# Patient Record
Sex: FEMALE | Race: BLACK OR AFRICAN AMERICAN | Employment: PART TIME | URBAN - METROPOLITAN AREA
[De-identification: names, ages, dates, MRNs, and addresses within clinical notes are randomized per-mention and may not be internally consistent; named-entity substitution may affect disease eponyms.]

---

## 2017-02-07 ENCOUNTER — HOSPITAL ENCOUNTER (OUTPATIENT)
Dept: LAB | Age: 27
Discharge: HOME | End: 2017-02-07

## 2017-02-07 ENCOUNTER — OFFICE VISIT (OUTPATIENT)
Dept: FAMILY MEDICINE CLINIC | Age: 27
End: 2017-02-07

## 2017-02-07 VITALS
HEART RATE: 96 BPM | SYSTOLIC BLOOD PRESSURE: 143 MMHG | BODY MASS INDEX: 53.92 KG/M2 | TEMPERATURE: 98.8 F | HEIGHT: 62 IN | DIASTOLIC BLOOD PRESSURE: 70 MMHG | RESPIRATION RATE: 20 BRPM | OXYGEN SATURATION: 97 % | WEIGHT: 293 LBS

## 2017-02-07 DIAGNOSIS — R68.89 COLD INTOLERANCE: ICD-10-CM

## 2017-02-07 DIAGNOSIS — I10 ESSENTIAL HYPERTENSION: ICD-10-CM

## 2017-02-07 DIAGNOSIS — E66.01 MORBID OBESITY WITH BMI OF 70 AND OVER, ADULT (HCC): Primary | ICD-10-CM

## 2017-02-07 PROCEDURE — 99001 SPECIMEN HANDLING PT-LAB: CPT | Performed by: FAMILY MEDICINE

## 2017-02-07 RX ORDER — NALTREXONE HYDROCHLORIDE 50 MG/1
25 TABLET, FILM COATED ORAL DAILY
Qty: 30 TAB | Refills: 0 | Status: SHIPPED | OUTPATIENT
Start: 2017-02-07 | End: 2017-02-21 | Stop reason: SINTOL

## 2017-02-07 RX ORDER — BUPROPION HYDROCHLORIDE 100 MG/1
100 TABLET ORAL 2 TIMES DAILY
Qty: 60 TAB | Refills: 0 | Status: SHIPPED | OUTPATIENT
Start: 2017-02-07 | End: 2017-02-21 | Stop reason: SINTOL

## 2017-02-07 RX ORDER — VERAPAMIL HYDROCHLORIDE 100 MG/1
100 CAPSULE, EXTENDED RELEASE ORAL
Qty: 30 CAP | Refills: 0 | Status: SHIPPED | OUTPATIENT
Start: 2017-02-07 | End: 2017-04-27 | Stop reason: SDUPTHER

## 2017-02-07 NOTE — PROGRESS NOTES
1. Have you been to the ER, urgent care clinic since your last visit? Hospitalized since your last visit? No    2. Have you seen or consulted any other health care providers outside of the [address] [place] [address] since your last visit? Include any pap smears or colon screening.  No

## 2017-02-07 NOTE — PATIENT INSTRUCTIONS
DASH Diet: Care Instructions  Your Care Instructions  The DASH diet is an eating plan that can help lower your blood pressure. DASH stands for Dietary Approaches to Stop Hypertension. Hypertension is high blood pressure. The DASH diet focuses on eating foods that are high in calcium, potassium, and magnesium. These nutrients can lower blood pressure. The foods that are highest in these nutrients are fruits, vegetables, low-fat dairy products, nuts, seeds, and legumes. But taking calcium, potassium, and magnesium supplements instead of eating foods that are high in those nutrients does not have the same effect. The DASH diet also includes whole grains, fish, and poultry. The DASH diet is one of several lifestyle changes your doctor may recommend to lower your high blood pressure. Your doctor may also want you to decrease the amount of sodium in your diet. Lowering sodium while following the DASH diet can lower blood pressure even further than just the DASH diet alone. Follow-up care is a key part of your treatment and safety. Be sure to make and go to all appointments, and call your doctor if you are having problems. It's also a good idea to know your test results and keep a list of the medicines you take. How can you care for yourself at home? Following the DASH diet  · Eat 4 to 5 servings of fruit each day. A serving is 1 medium-sized piece of fruit, ½ cup chopped or canned fruit, 1/4 cup dried fruit, or 4 ounces (½ cup) of fruit juice. Choose fruit more often than fruit juice. · Eat 4 to 5 servings of vegetables each day. A serving is 1 cup of lettuce or raw leafy vegetables, ½ cup of chopped or cooked vegetables, or 4 ounces (½ cup) of vegetable juice. Choose vegetables more often than vegetable juice. · Get 2 to 3 servings of low-fat and fat-free dairy each day. A serving is 8 ounces of milk, 1 cup of yogurt, or 1 ½ ounces of cheese. · Eat 6 to 8 servings of grains each day.  A serving is 1 slice of bread, 1 ounce of dry cereal, or ½ cup of cooked rice, pasta, or cooked cereal. Try to choose whole-grain products as much as possible. · Limit lean meat, poultry, and fish to 2 servings each day. A serving is 3 ounces, about the size of a deck of cards. · Eat 4 to 5 servings of nuts, seeds, and legumes (cooked dried beans, lentils, and split peas) each week. A serving is 1/3 cup of nuts, 2 tablespoons of seeds, or ½ cup of cooked beans or peas. · Limit fats and oils to 2 to 3 servings each day. A serving is 1 teaspoon of vegetable oil or 2 tablespoons of salad dressing. · Limit sweets and added sugars to 5 servings or less a week. A serving is 1 tablespoon jelly or jam, ½ cup sorbet, or 1 cup of lemonade. · Eat less than 2,300 milligrams (mg) of sodium a day. If you limit your sodium to 1,500 mg a day, you can lower your blood pressure even more. Tips for success  · Start small. Do not try to make dramatic changes to your diet all at once. You might feel that you are missing out on your favorite foods and then be more likely to not follow the plan. Make small changes, and stick with them. Once those changes become habit, add a few more changes. · Try some of the following:  ¨ Make it a goal to eat a fruit or vegetable at every meal and at snacks. This will make it easy to get the recommended amount of fruits and vegetables each day. ¨ Try yogurt topped with fruit and nuts for a snack or healthy dessert. ¨ Add lettuce, tomato, cucumber, and onion to sandwiches. ¨ Combine a ready-made pizza crust with low-fat mozzarella cheese and lots of vegetable toppings. Try using tomatoes, squash, spinach, broccoli, carrots, cauliflower, and onions. ¨ Have a variety of cut-up vegetables with a low-fat dip as an appetizer instead of chips and dip. ¨ Sprinkle sunflower seeds or chopped almonds over salads. Or try adding chopped walnuts or almonds to cooked vegetables. ¨ Try some vegetarian meals using beans and peas. Add garbanzo or kidney beans to salads. Make burritos and tacos with mashed burch beans or black beans. Where can you learn more? Go to http://chaya-eris.info/. Enter I133 in the search box to learn more about \"DASH Diet: Care Instructions. \"  Current as of: March 23, 2016  Content Version: 11.1  © 5015-9240 [employer]. Care instructions adapted under license by [employer] (which disclaims liability or warranty for this information). If you have questions about a medical condition or this instruction, always ask your healthcare professional. [place] [address] any warranty or liability for your use of this information. Starting a Weight Loss Plan: Care Instructions  Your Care Instructions  If you are thinking about losing weight, it can be hard to know where to start. Your doctor can help you set up a weight loss plan that best meets your needs. You may want to take a class on nutrition or exercise, or join a weight loss support group. If you have questions about how to make changes to your eating or exercise habits, ask your doctor about seeing a registered dietitian or an exercise specialist.  It can be a big challenge to lose weight. But you do not have to make huge changes at once. Make small changes, and stick with them. When those changes become habit, add a few more changes. If you do not think you are ready to make changes right now, try to pick a date in the future. Make an appointment to see your doctor to discuss whether the time is right for you to start a plan. Follow-up care is a key part of your treatment and safety. Be sure to make and go to all appointments, and call your doctor if you are having problems. Its also a good idea to know your test results and keep a list of the medicines you take. How can you care for yourself at home? · Set realistic goals. Many people expect to lose much more weight than is likely.  A weight loss of 5% to 10% of your body weight may be enough to improve your health. · Get family and friends involved to provide support. Talk to them about why you are trying to lose weight, and ask them to help. They can help by participating in exercise and having meals with you, even if they may be eating something different. · Find what works best for you. If you do not have time or do not like to cook, a program that offers meal replacement bars or shakes may be better for you. Or if you like to prepare meals, finding a plan that includes daily menus and recipes may be best.  · Ask your doctor about other health professionals who can help you achieve your weight loss goals. ¨ A dietitian can help you make healthy changes in your diet. ¨ An exercise specialist or  can help you develop a safe and effective exercise program.  ¨ A counselor or psychiatrist can help you cope with issues such as depression, anxiety, or family problems that can make it hard to focus on weight loss. · Consider joining a support group for people who are trying to lose weight. Your doctor can suggest groups in your area. Where can you learn more? Go to http://chayahi5eris.info/. Enter A099 in the search box to learn more about \"Starting a Weight Loss Plan: Care Instructions. \"  Current as of: February 16, 2016  Content Version: 11.1  © 4764-0497 [employer]. Care instructions adapted under license by [employer] (which disclaims liability or warranty for this information). If you have questions about a medical condition or this instruction, always ask your healthcare professional. [place] [address] any warranty or liability for your use of this information.

## 2017-02-07 NOTE — PROGRESS NOTES
Zulay Jarquin is a 23 y. o.  female and presents with    Chief Complaint   Patient presents with    New Patient     Establish care           Subjective:  Hypertension  Patient is here for evaluation of hypertension. Age at onset of elevated blood pressure:  17.  She indicates that she is feeling well and denies any symptoms referable to her elevated blood pressure. Specifically denies chest pain, palpitations, dyspnea, orthopnea, PND or peripheral edema. Current medication regimen is as listed   below. Patient has these side effects of medication: headaches. Cardiovascular risk factors: obesity, hypertension Use of agents associated with hypertension: none History of renal disease: negative  History of flank trauma: negative    She is concerned about her weight. She reports that she has used phentermine in the past and had mouth swelling. She is interested in further treatment for weight loss. She had a half a bagel this morning; she usually has a salad at lunch or a can of tuna; dinner is baked food; she is avoiding junk food    Patient Active Problem List   Diagnosis Code    Essential hypertension I10     Patient Active Problem List    Diagnosis Date Noted    Essential hypertension 02/07/2017     Current Outpatient Prescriptions   Medication Sig Dispense Refill    labetalol (NORMODYNE) 100 mg tablet Take 100 mg by mouth two (2) times a day. Allergies   Allergen Reactions    Phentermine Angioedema     Past Medical History   Diagnosis Date    HTN (hypertension)     Morbid obesity with BMI of 70 and over, adult [facility])      History reviewed. No pertinent past surgical history.   Family History   Problem Relation Age of Onset    No Known Problems Mother     No Known Problems Father      Social History   Substance Use Topics    Smoking status: Never Smoker    Smokeless tobacco: Not on file    Alcohol use No       ROS   General ROS: negative for - chills, fever or weight loss  Ophthalmic ROS: positive for - uses glasses  ENT ROS: positive for - headaches  Endocrine ROS: negative for - palpitations or temperature intolerance  Respiratory ROS: no cough, shortness of breath, or wheezing  Gastrointestinal ROS: no abdominal pain, change in bowel habits, or black or bloody stools  Genito-Urinary ROS: no dysuria, trouble voiding, or hematuria  Neurological ROS: negative for - numbness/tingling or weakness  Dermatological ROS: negative for - rash or skin lesion changes    All other systems reviewed and are negative. Objective:  Vitals:    02/07/17 0918   BP: 143/70   Pulse: 96   Resp: 20   Temp: 98.8 °F (37.1 °C)   TempSrc: Oral   SpO2: 97%   Weight: (!) 458 lb 3.2 oz (207.8 kg)   Height: 5' 2\" (1.575 m)   PainSc:   9       alert, well appearing, and in no distress, oriented to person, place, and time and morbidly obese  Mental status - normal mood, behavior, speech, dress, motor activity, and thought processes  Neck - supple, no significant adenopathy, thyroid exam: thyroid is normal in size without nodules or tenderness  Chest - clear to auscultation, no wheezes, rales or rhonchi, symmetric air entry  Heart - normal rate, regular rhythm, normal S1, S2, no murmurs, rubs, clicks or gallops  Neurological - normal gait and station    Assessment/Plan:    1. Morbid obesity with BMI of 70 and over, adult [facility])  Menu plan provided; encourage water exercises; start bupropionand natrexone for treatment  - buPROPion (WELLBUTRIN) 100 mg tablet; Take 1 Tab by mouth two (2) times a day. Dispense: 60 Tab; Refill: 0  - naltrexone (DEPADE) 50 mg tablet; Take 0.5 Tabs by mouth daily. Dispense: 30 Tab; Refill: 0    2. Essential hypertension  Goal < 140/90; start verapamil for control  - verapamil ER (VERELAN PM) 100 mg capsule; Take 1 Cap by mouth nightly. Dispense: 30 Cap;  Refill: 0      Lab review: orders written for new lab studies as appropriate; see orders      I have discussed the diagnosis with the patient and the intended plan as seen in the above orders. The patient has received an after-visit summary and questions were answered concerning future plans. I have discussed medication side effects and warnings with the patient as well. I have reviewed the plan of care with the patient, accepted their input and they are in agreement with the treatment goals. Follow-up Disposition:  Return in about 2 weeks (around 2/21/2017) for medication evaluation.

## 2017-02-07 NOTE — MR AVS SNAPSHOT
Visit Information Date & Time Provider Department Dept. Phone Encounter #  
 2/7/2017  9:00 AM Felicita Breen, [address] [facility] [phone] [id number] Follow-up Instructions Return in about 2 weeks (around 2/21/2017) for medication evaluation. Upcoming Health Maintenance Date Due  
 HPV AGE 9Y-34Y (1 of 3 - Female 3 Dose Series) 6/5/2002 DTaP/Tdap/Td series (1 - Tdap) 6/5/2012 PAP AKA CERVICAL CYTOLOGY 6/5/2012 INFLUENZA AGE 9 TO ADULT 8/1/2016 Allergies as of 2/7/2017  Review Complete On: 2/7/2017 By: Felicita Breen MD  
  
 Severity Noted Reaction Type Reactions Phentermine  02/07/2017    Angioedema Current Immunizations  Never Reviewed No immunizations on file. Not reviewed this visit You Were Diagnosed With   
  
 Codes Comments Morbid obesity with BMI of 70 and over, adult [facility])    -  Primary ICD-10-CM: E66.01, D96.80 ICD-9-CM: 278.01, V85.45 Essential hypertension     ICD-10-CM: I10 
ICD-9-CM: 401.9 Cold intolerance     ICD-10-CM: R68.89 ICD-9-CM: 780.99 Vitals BP Pulse Temp Resp Height(growth percentile) Weight(growth percentile) 143/70 (BP 1 Location: Right arm, BP Patient Position: Sitting) 96 98.8 °F (37.1 °C) (Oral) 20 5' 2\" (1.575 m) (!) 458 lb 3.2 oz (207.8 kg) SpO2 BMI OB Status Smoking Status 97% 83.81 kg/m2 Having regular periods Never Smoker Vitals History BMI and BSA Data Body Mass Index Body Surface Area  
 83.81 kg/m 2 3.01 m 2 Preferred Pharmacy Pharmacy Name Phone [facility] [address], [address] [place], [place] [address]rad[address] [phone] Your Updated Medication List  
  
   
This list is accurate as of: 2/7/17  9:45 AM.  Always use your most recent med list.  
  
  
  
  
 buPROPion 100 mg tablet Commonly known as:  [facility] Take 1 Tab by mouth two (2) times a day. naltrexone 50 mg tablet Commonly known as:  DEPADE  
 Take 0.5 Tabs by mouth daily. verapamil  mg capsule Commonly known as:  VERELAN PM  
Take 1 Cap by mouth nightly. Prescriptions Sent to Pharmacy Refills buPROPion (WELLBUTRIN) 100 mg tablet 0 Sig: Take 1 Tab by mouth two (2) times a day. Class: Normal  
 Pharmacy: [id number], [address] Ph #: [phone] Route: Oral  
 naltrexone (DEPADE) 50 mg tablet 0 Sig: Take 0.5 Tabs by mouth daily. Class: Normal  
 Pharmacy: [id number], [address] Ph #: [phone] Route: Oral  
 verapamil ER (VERELAN PM) 100 mg capsule 0 Sig: Take 1 Cap by mouth nightly. Class: Normal  
 Pharmacy: [id number], [address] Ph #: [phone] Route: Oral  
  
Follow-up Instructions Return in about 2 weeks (around 2/21/2017) for medication evaluation. To-Do List   
 02/07/2017 Lab:  CBC WITH AUTOMATED DIFF   
  
 02/07/2017 Lab:  HEMOGLOBIN A1C WITH EAG   
  
 02/07/2017 Lab:  LIPID PANEL   
  
 02/07/2017 Lab:  TSH 3RD GENERATION Patient Instructions DASH Diet: Care Instructions Your Care Instructions The DASH diet is an eating plan that can help lower your blood pressure. DASH stands for Dietary Approaches to Stop Hypertension. Hypertension is high blood pressure. The DASH diet focuses on eating foods that are high in calcium, potassium, and magnesium. These nutrients can lower blood pressure. The foods that are highest in these nutrients are fruits, vegetables, low-fat dairy products, nuts, seeds, and legumes. But taking calcium, potassium, and magnesium supplements instead of eating foods that are high in those nutrients does not have the same effect. The DASH diet also includes whole grains, fish, and poultry.  
The DASH diet is one of several lifestyle changes your doctor may recommend to lower your high blood pressure. Your doctor may also want you to decrease the amount of sodium in your diet. Lowering sodium while following the DASH diet can lower blood pressure even further than just the DASH diet alone. Follow-up care is a key part of your treatment and safety. Be sure to make and go to all appointments, and call your doctor if you are having problems. It's also a good idea to know your test results and keep a list of the medicines you take. How can you care for yourself at home? Following the DASH diet · Eat 4 to 5 servings of fruit each day. A serving is 1 medium-sized piece of fruit, ½ cup chopped or canned fruit, 1/4 cup dried fruit, or 4 ounces (½ cup) of fruit juice. Choose fruit more often than fruit juice. · Eat 4 to 5 servings of vegetables each day. A serving is 1 cup of lettuce or raw leafy vegetables, ½ cup of chopped or cooked vegetables, or 4 ounces (½ cup) of vegetable juice. Choose vegetables more often than vegetable juice. · Get 2 to 3 servings of low-fat and fat-free dairy each day. A serving is 8 ounces of milk, 1 cup of yogurt, or 1 ½ ounces of cheese. · Eat 6 to 8 servings of grains each day. A serving is 1 slice of bread, 1 ounce of dry cereal, or ½ cup of cooked rice, pasta, or cooked cereal. Try to choose whole-grain products as much as possible. · Limit lean meat, poultry, and fish to 2 servings each day. A serving is 3 ounces, about the size of a deck of cards. · Eat 4 to 5 servings of nuts, seeds, and legumes (cooked dried beans, lentils, and split peas) each week. A serving is 1/3 cup of nuts, 2 tablespoons of seeds, or ½ cup of cooked beans or peas. · Limit fats and oils to 2 to 3 servings each day. A serving is 1 teaspoon of vegetable oil or 2 tablespoons of salad dressing. · Limit sweets and added sugars to 5 servings or less a week. A serving is 1 tablespoon jelly or jam, ½ cup sorbet, or 1 cup of lemonade. · Eat less than 2,300 milligrams (mg) of sodium a day. If you limit your sodium to 1,500 mg a day, you can lower your blood pressure even more. Tips for success · Start small. Do not try to make dramatic changes to your diet all at once. You might feel that you are missing out on your favorite foods and then be more likely to not follow the plan. Make small changes, and stick with them. Once those changes become habit, add a few more changes. · Try some of the following: ¨ Make it a goal to eat a fruit or vegetable at every meal and at snacks. This will make it easy to get the recommended amount of fruits and vegetables each day. ¨ Try yogurt topped with fruit and nuts for a snack or healthy dessert. ¨ Add lettuce, tomato, cucumber, and onion to sandwiches. ¨ Combine a ready-made pizza crust with low-fat mozzarella cheese and lots of vegetable toppings. Try using tomatoes, squash, spinach, broccoli, carrots, cauliflower, and onions. ¨ Have a variety of cut-up vegetables with a low-fat dip as an appetizer instead of chips and dip. ¨ Sprinkle sunflower seeds or chopped almonds over salads. Or try adding chopped walnuts or almonds to cooked vegetables. ¨ Try some vegetarian meals using beans and peas. Add garbanzo or kidney beans to salads. Make burritos and tacos with mashed burch beans or black beans. Where can you learn more? Go to http://chaya-eris.info/. Enter I646 in the search box to learn more about \"DASH Diet: Care Instructions. \" Current as of: March 23, 2016 Content Version: 11.1 © 6541-8281 Affinio. Care instructions adapted under license by ByeCity (which disclaims liability or warranty for this information). If you have questions about a medical condition or this instruction, always ask your healthcare professional. [address] any warranty or liability for your use of this information. Starting a Weight Loss Plan: Care Instructions Your Care Instructions If you are thinking about losing weight, it can be hard to know where to start. Your doctor can help you set up a weight loss plan that best meets your needs. You may want to take a class on nutrition or exercise, or join a weight loss support group. If you have questions about how to make changes to your eating or exercise habits, ask your doctor about seeing a registered dietitian or an exercise specialist. 
It can be a big challenge to lose weight. But you do not have to make huge changes at once. Make small changes, and stick with them. When those changes become habit, add a few more changes. If you do not think you are ready to make changes right now, try to pick a date in the future. Make an appointment to see your doctor to discuss whether the time is right for you to start a plan. Follow-up care is a key part of your treatment and safety. Be sure to make and go to all appointments, and call your doctor if you are having problems. Its also a good idea to know your test results and keep a list of the medicines you take. How can you care for yourself at home? · Set realistic goals. Many people expect to lose much more weight than is likely. A weight loss of 5% to 10% of your body weight may be enough to improve your health. · Get family and friends involved to provide support. Talk to them about why you are trying to lose weight, and ask them to help. They can help by participating in exercise and having meals with you, even if they may be eating something different. · Find what works best for you. If you do not have time or do not like to cook, a program that offers meal replacement bars or shakes may be better for you. Or if you like to prepare meals, finding a plan that includes daily menus and recipes may be best. 
· Ask your doctor about other health professionals who can help you achieve your weight loss goals. ¨ A dietitian can help you make healthy changes in your diet. ¨ An exercise specialist or  can help you develop a safe and effective exercise program. 
¨ A counselor or psychiatrist can help you cope with issues such as depression, anxiety, or family problems that can make it hard to focus on weight loss. · Consider joining a support group for people who are trying to lose weight. Your doctor can suggest groups in your area. Where can you learn more? Go to http://chaya-eris.info/. Enter O494 in the search box to learn more about \"Starting a Weight Loss Plan: Care Instructions. \" Current as of: February 16, 2016 Content Version: 11.1 © 6219-4431 Purveyour. Care instructions adapted under license by AccessPay (which disclaims liability or warranty for this information). If you have questions about a medical condition or this instruction, always ask your healthcare professional. [place] [address] any warranty or liability for your use of this information. Introducing [facility] & HEALTH SERVICES! Spring Evans introduces Biba patient portal. Now you can access parts of your medical record, email your doctor's office, and request medication refills online. 1. In your internet browser, go to [email or web address] 2. Click on the First Time User? Click Here link in the Sign In box. You will see the New Member Sign Up page. 3. Enter your Biba Access Code exactly as it appears below. You will not need to use this code after youve completed the sign-up process. If you do not sign up before the expiration date, you must request a new code. · Biba Access Code: [email or web address] Expires: 5/8/2017  8:38 AM 
 
4. Enter the last four digits of your Social Security Number (xxxx) and Date of Birth (mm/dd/yyyy) as indicated and click Submit. You will be taken to the next sign-up page. 5. Create a BloomReach ID. This will be your BloomReach login ID and cannot be changed, so think of one that is secure and easy to remember. 6. Create a BloomReach password. You can change your password at any time. 7. Enter your Password Reset Question and Answer. This can be used at a later time if you forget your password. 8. Enter your e-mail address. You will receive e-mail notification when new information is available in [address] 9. Click Sign Up. You can now view and download portions of your medical record. 10. Click the Download Summary menu link to download a portable copy of your medical information. If you have questions, please visit the Frequently Asked Questions section of the BloomReach website. Remember, BloomReach is NOT to be used for urgent needs. For medical emergencies, dial 911. Now available from your iPhone and Android! Please provide this summary of care documentation to your next provider. Your primary care clinician is listed as NONE. If you have any questions after today's visit, please call [phone].

## 2017-02-08 LAB
BASOPHILS # BLD AUTO: 0 X10E3/UL (ref 0–0.2)
BASOPHILS NFR BLD AUTO: 0 %
CHOLEST SERPL-MCNC: 176 MG/DL (ref 100–199)
EOSINOPHIL # BLD AUTO: 0.1 X10E3/UL (ref 0–0.4)
EOSINOPHIL NFR BLD AUTO: 3 %
ERYTHROCYTE [DISTWIDTH] IN BLOOD BY AUTOMATED COUNT: 13.8 % (ref 12.3–15.4)
EST. AVERAGE GLUCOSE BLD GHB EST-MCNC: 128 MG/DL
HBA1C MFR BLD: 6.1 % (ref 4.8–5.6)
HCT VFR BLD AUTO: 38.1 % (ref 34–46.6)
HDLC SERPL-MCNC: 58 MG/DL
HGB BLD-MCNC: 12.1 G/DL (ref 11.1–15.9)
IMM GRANULOCYTES # BLD: 0 X10E3/UL (ref 0–0.1)
IMM GRANULOCYTES NFR BLD: 0 %
INTERPRETATION, 910389: NORMAL
LDLC SERPL CALC-MCNC: 109 MG/DL (ref 0–99)
LYMPHOCYTES # BLD AUTO: 1.8 X10E3/UL (ref 0.7–3.1)
LYMPHOCYTES NFR BLD AUTO: 38 %
MCH RBC QN AUTO: 27.2 PG (ref 26.6–33)
MCHC RBC AUTO-ENTMCNC: 31.8 G/DL (ref 31.5–35.7)
MCV RBC AUTO: 86 FL (ref 79–97)
MONOCYTES # BLD AUTO: 0.4 X10E3/UL (ref 0.1–0.9)
MONOCYTES NFR BLD AUTO: 8 %
NEUTROPHILS # BLD AUTO: 2.5 X10E3/UL (ref 1.4–7)
NEUTROPHILS NFR BLD AUTO: 51 %
PLATELET # BLD AUTO: 394 X10E3/UL (ref 150–379)
RBC # BLD AUTO: 4.45 X10E6/UL (ref 3.77–5.28)
TRIGL SERPL-MCNC: 47 MG/DL (ref 0–149)
TSH SERPL DL<=0.005 MIU/L-ACNC: 1.72 UIU/ML (ref 0.45–4.5)
VLDLC SERPL CALC-MCNC: 9 MG/DL (ref 5–40)
WBC # BLD AUTO: 4.8 X10E3/UL (ref 3.4–10.8)

## 2017-02-21 ENCOUNTER — OFFICE VISIT (OUTPATIENT)
Dept: FAMILY MEDICINE CLINIC | Age: 27
End: 2017-02-21

## 2017-02-21 VITALS
DIASTOLIC BLOOD PRESSURE: 88 MMHG | SYSTOLIC BLOOD PRESSURE: 151 MMHG | BODY MASS INDEX: 53.92 KG/M2 | WEIGHT: 293 LBS | OXYGEN SATURATION: 97 % | RESPIRATION RATE: 20 BRPM | TEMPERATURE: 98.6 F | HEART RATE: 90 BPM | HEIGHT: 62 IN

## 2017-02-21 DIAGNOSIS — E66.01 MORBID OBESITY WITH BMI OF 70 AND OVER, ADULT (HCC): ICD-10-CM

## 2017-02-21 DIAGNOSIS — Z00.00 MEDICARE ANNUAL WELLNESS VISIT, INITIAL: ICD-10-CM

## 2017-02-21 DIAGNOSIS — I10 ESSENTIAL HYPERTENSION: Primary | ICD-10-CM

## 2017-02-21 DIAGNOSIS — R73.03 PREDIABETES: ICD-10-CM

## 2017-02-21 DIAGNOSIS — Z71.89 ADVANCE CARE PLANNING: ICD-10-CM

## 2017-02-21 RX ORDER — HYDROCHLOROTHIAZIDE 25 MG/1
25 TABLET ORAL DAILY
Qty: 30 TAB | Refills: 1 | Status: SHIPPED | OUTPATIENT
Start: 2017-02-21 | End: 2017-04-27 | Stop reason: SDUPTHER

## 2017-02-21 RX ORDER — METFORMIN HYDROCHLORIDE 500 MG/1
500 TABLET, EXTENDED RELEASE ORAL
Qty: 30 TAB | Refills: 1 | Status: SHIPPED | OUTPATIENT
Start: 2017-02-21 | End: 2017-09-22 | Stop reason: SINTOL

## 2017-02-21 NOTE — MR AVS SNAPSHOT
Visit Information Date & Time Provider Department Dept. Phone Encounter #  
 2/21/2017  9:00 AM Lexy Simmons, [address] [facility] [phone] Follow-up Instructions Return in about 1 month (around 3/21/2017) for weight management and blood pressure. Upcoming Health Maintenance Date Due  
 HPV AGE 9Y-34Y (1 of 3 - Female 3 Dose Series) 6/5/2002 DTaP/Tdap/Td series (1 - Tdap) 6/5/2012 PAP AKA CERVICAL CYTOLOGY 6/5/2012 Allergies as of 2/21/2017  Review Complete On: 2/21/2017 By: Lexy Simmons MD  
  
 Severity Noted Reaction Type Reactions Phentermine  02/07/2017    Angioedema Current Immunizations  Never Reviewed No immunizations on file. Not reviewed this visit You Were Diagnosed With   
  
 Codes Comments Essential hypertension    -  Primary ICD-10-CM: I10 
ICD-9-CM: 401.9 Morbid obesity with BMI of 70 and over, adult [facility])     ICD-10-CM: E66.01, Z68.45 ICD-9-CM: 278.01, V85.45 Prediabetes     ICD-10-CM: R73.03 
ICD-9-CM: 790.29 Medicare annual wellness visit, initial     ICD-10-CM: Z00.00 ICD-9-CM: V70.0 Advance care planning     ICD-10-CM: Z71.89 ICD-9-CM: V65.49 Vitals BP Pulse Temp Resp Height(growth percentile) Weight(growth percentile) 151/88 90 98.6 °F (37 °C) (Oral) 20 5' 2\" (1.575 m) (!) 454 lb 12.8 oz (206.3 kg) SpO2 BMI OB Status Smoking Status 97% 83.18 kg/m2 Unknown Never Smoker Vitals History BMI and BSA Data Body Mass Index Body Surface Area  
 83.18 kg/m 2 3 m 2 Preferred Pharmacy Pharmacy Name Phone [facility] [address], [address] [place], [place] [address] [phone] Your Updated Medication List  
  
   
This list is accurate as of: 2/21/17  9:29 AM.  Always use your most recent med list.  
  
  
  
  
 hydroCHLOROthiazide 25 mg tablet Commonly known as:  HYDRODIURIL Take 1 Tab by mouth daily. metFORMIN  mg tablet Commonly known as:  GLUCOPHAGE XR Take 1 Tab by mouth daily (with dinner). verapamil  mg capsule Commonly known as:  VERELAN PM  
Take 1 Cap by mouth nightly. Prescriptions Sent to Pharmacy Refills  
 metFORMIN ER (GLUCOPHAGE XR) 500 mg tablet 1 Sig: Take 1 Tab by mouth daily (with dinner). Class: Normal  
 Pharmacy: [id number], [address] Ph #: [phone] Route: Oral  
 hydroCHLOROthiazide (HYDRODIURIL) 25 mg tablet 1 Sig: Take 1 Tab by mouth daily. Class: Normal  
 Pharmacy: [id number], [address] Ph #: [phone] Route: Oral  
  
Follow-up Instructions Return in about 1 month (around 3/21/2017) for weight management and blood pressure. Patient Instructions Well Visit, Ages 25 to 48: Care Instructions Your Care Instructions Physical exams can help you stay healthy. Your doctor has checked your overall health and may have suggested ways to take good care of yourself. He or she also may have recommended tests. At home, you can help prevent illness with healthy eating, regular exercise, and other steps. Follow-up care is a key part of your treatment and safety. Be sure to make and go to all appointments, and call your doctor if you are having problems. It's also a good idea to know your test results and keep a list of the medicines you take. How can you care for yourself at home? · Reach and stay at a healthy weight. This will lower your risk for many problems, such as obesity, diabetes, heart disease, and high blood pressure. · Get at least 30 minutes of physical activity on most days of the week. Walking is a good choice. You also may want to do other activities, such as running, swimming, cycling, or playing tennis or team sports. Discuss any changes in your exercise program with your doctor. · Do not smoke or allow others to smoke around you. If you need help quitting, talk to your doctor about stop-smoking programs and medicines. These can increase your chances of quitting for good. · Talk to your doctor about whether you have any risk factors for sexually transmitted infections (STIs). Having one sex partner (who does not have STIs and does not have sex with anyone else) is a good way to avoid these infections. · Use birth control if you do not want to have children at this time. Talk with your doctor about the choices available and what might be best for you. · Protect your skin from too much sun. When you're outdoors from 10 a.m. to 4 p.m., stay in the shade or cover up with clothing and a hat with a wide brim. Wear sunglasses that block UV rays. Even when it's cloudy, put broad-spectrum sunscreen (SPF 30 or higher) on any exposed skin. · See a dentist one or two times a year for checkups and to have your teeth cleaned. · Wear a seat belt in the car. · Drink alcohol in moderation, if at all. That means no more than 2 drinks a day for men and 1 drink a day for women. Follow your doctor's advice about when to have certain tests. These tests can spot problems early. For everyone · Cholesterol. Have the fat (cholesterol) in your blood tested after age 21. Your doctor will tell you how often to have this done based on your age, family history, or other things that can increase your risk for heart disease. · Blood pressure. Have your blood pressure checked during a routine doctor visit. Your doctor will tell you how often to check your blood pressure based on your age, your blood pressure results, and other factors. · Vision. Talk with your doctor about how often to have a glaucoma test. 
· Diabetes. Ask your doctor whether you should have tests for diabetes. · Colon cancer. Have a test for colon cancer at age 48.  You may have one of several tests. If you are younger than 48, you may need a test earlier if you have any risk factors. Risk factors include whether you already had a precancerous polyp removed from your colon or whether your parent, brother, sister, or child has had colon cancer. For women · Breast exam and mammogram. Talk to your doctor about when you should have a clinical breast exam and a mammogram. Medical experts differ on whether and how often women under 50 should have these tests. Your doctor can help you decide what is right for you. · Pap test and pelvic exam. Begin Pap tests at age 24. A Pap test is the best way to find cervical cancer. The test often is part of a pelvic exam. Ask how often to have this test. 
· Tests for sexually transmitted infections (STIs). Ask whether you should have tests for STIs. You may be at risk if you have sex with more than one person, especially if your partners do not wear condoms. For men · Tests for sexually transmitted infections (STIs). Ask whether you should have tests for STIs. You may be at risk if you have sex with more than one person, especially if you do not wear a condom. · Testicular cancer exam. Ask your doctor whether you should check your testicles regularly. · Prostate exam. Talk to your doctor about whether you should have a blood test (called a PSA test) for prostate cancer. Experts differ on whether and when men should have this test. Some experts suggest it if you are older than 39 and are -American or have a father or brother who got prostate cancer when he was younger than 72. When should you call for help? Watch closely for changes in your health, and be sure to contact your doctor if you have any problems or symptoms that concern you. Where can you learn more? Go to http://chaya-eris.info/. Enter P072 in the search box to learn more about \"Well Visit, Ages 25 to 48: Care Instructions. \" Current as of: July 19, 2016 Content Version: 11.1 © 7482-6644 [employer]elon Pharmaceuticals, Wish Days. Care instructions adapted under license by Iwebalize (which disclaims liability or warranty for this information). If you have questions about a medical condition or this instruction, always ask your healthcare professional. [address] any warranty or liability for your use of this information. Introducing [facility] & HEALTH SERVICES! Byron Lindquist introduces Simbiosis patient portal. Now you can access parts of your medical record, email your doctor's office, and request medication refills online. 1. In your internet browser, go to [email or web address] 2. Click on the First Time User? Click Here link in the Sign In box. You will see the New Member Sign Up page. 3. Enter your Simbiosis Access Code exactly as it appears below. You will not need to use this code after youve completed the sign-up process. If you do not sign up before the expiration date, you must request a new code. · Simbiosis Access Code: [email or web address] Expires: 5/8/2017  8:38 AM 
 
4. Enter the last four digits of your Social Security Number (xxxx) and Date of Birth (mm/dd/yyyy) as indicated and click Submit. You will be taken to the next sign-up page. 5. Create a Simbiosis ID. This will be your Simbiosis login ID and cannot be changed, so think of one that is secure and easy to remember. 6. Create a Simbiosis password. You can change your password at any time. 7. Enter your Password Reset Question and Answer. This can be used at a later time if you forget your password. 8. Enter your e-mail address. You will receive e-mail notification when new information is available in [address] 9. Click Sign Up. You can now view and download portions of your medical record. 10. Click the Download Summary menu link to download a portable copy of your medical information.  
 
If you have questions, please visit the Frequently Asked Questions section of the [employer]. Remember, Technitrolhart is NOT to be used for urgent needs. For medical emergencies, dial 911. Now available from your iPhone and Android! Please provide this summary of care documentation to your next provider. Your primary care clinician is listed as Jacob Valdivia. If you have any questions after today's visit, please call [phone].

## 2017-02-21 NOTE — PROGRESS NOTES
1. Have you been to the ER, urgent care clinic since your last visit? Hospitalized since your last visit? No    2. Have you seen or consulted any other health care providers outside of the [address] [place] [address] since your last visit? Include any pap smears or colon screening.  No

## 2017-02-21 NOTE — PATIENT INSTRUCTIONS

## 2017-02-21 NOTE — PROGRESS NOTES
(AWV) The Initial Medicare Annual Wellness Exam PROGRESS NOTE    This is an Initial Medicare Annual Wellness Exam (AWV) I have reviewed the patient's medical history in detail and updated the computerized patient record. Ailin Ruff is a 23 y. o.  female and presents for an annual wellness exam       Patient Active Problem List   Diagnosis Code    Essential hypertension I10     Patient Active Problem List    Diagnosis Date Noted    Essential hypertension 02/07/2017     Current Outpatient Prescriptions   Medication Sig Dispense Refill    buPROPion (WELLBUTRIN) 100 mg tablet Take 1 Tab by mouth two (2) times a day. 60 Tab 0    naltrexone (DEPADE) 50 mg tablet Take 0.5 Tabs by mouth daily. 30 Tab 0    verapamil ER (VERELAN PM) 100 mg capsule Take 1 Cap by mouth nightly. 30 Cap 0     Allergies   Allergen Reactions    Phentermine Angioedema     Past Medical History   Diagnosis Date    HTN (hypertension)     Morbid obesity with BMI of 70 and over, adult [facility])      History reviewed. No pertinent past surgical history.   Family History   Problem Relation Age of Onset    No Known Problems Mother     No Known Problems Father      Social History   Substance Use Topics    Smoking status: Never Smoker    Smokeless tobacco: Not on file    Alcohol use No       ROS   General ROS: negative for - chills or fever  Psychological ROS: positive for - depression due to treatment fro those around her regarding her weight  Ophthalmic ROS: positive for - uses glasses  ENT ROS: negative for - headaches  Endocrine ROS: negative for - polydipsia/polyuria or temperature intolerance  Respiratory ROS: no cough, shortness of breath, or wheezing  Cardiovascular ROS: no chest pain or dyspnea on exertion  Gastrointestinal ROS: no abdominal pain, change in bowel habits, or black or bloody stools  Genito-Urinary ROS: no dysuria, trouble voiding, or hematuria  Neurological ROS: positive for - dizziness  Dermatological ROS: negative for - rash or skin lesion changes    All other systems reviewed and are negative. History     Past Medical History   Diagnosis Date    HTN (hypertension)     Morbid obesity with BMI of 70 and over, adult [facility])       History reviewed. No pertinent past surgical history. Current Outpatient Prescriptions   Medication Sig Dispense Refill    buPROPion (WELLBUTRIN) 100 mg tablet Take 1 Tab by mouth two (2) times a day. 60 Tab 0    naltrexone (DEPADE) 50 mg tablet Take 0.5 Tabs by mouth daily. 30 Tab 0    verapamil ER (VERELAN PM) 100 mg capsule Take 1 Cap by mouth nightly. 30 Cap 0     Allergies   Allergen Reactions    Phentermine Angioedema     Family History   Problem Relation Age of Onset    No Known Problems Mother     No Known Problems Father      Social History   Substance Use Topics    Smoking status: Never Smoker    Smokeless tobacco: Not on file    Alcohol use No     Patient Active Problem List   Diagnosis Code    Essential hypertension I10       Health Maintenance History  Immunizations reviewed, dtap up to date , pneumovax N/A, flu up to date, zoster N/A    Depression Risk Factor Screening:      Patient Health Questionnaire (PHQ-2)   Over the last 2 weeks, how often have you been bothered by any of the following problems? · Little interest or pleasure in doing things? · More than half the days. [2]  · Feeling down, depressed, or hopeless? · Not at all. [0]    Total Score: 2/6  PHQ-2 Assessment Scoring:   A score of 2 or more requires further screening with the PHQ-9    Alcohol Risk Factor Screening:     Women: On any occasion during the past 3 months, have you had more than 3 drinks containing alcohol?  no   Do you average more than 7 drinks per week? no    Functional Ability and Level of Safety:     Hearing Loss    none    Activities of Daily Living   Self-care.    Requires assistance with: no ADLs    Fall Risk   History of fall(s) in the past 3 months (25 pts)  Score: 25    Abuse Screen   None    Examination   Physical Examination  Vitals:    02/21/17 0859 02/21/17 0904   BP: (!) 148/91 151/88   Pulse: 90    Resp: 20    Temp: 98.6 °F (37 °C)    TempSrc: Oral    SpO2: 97%    Weight: (!) 454 lb 12.8 oz (206.3 kg)    Height: 5' 2\" (1.575 m)    PainSc:   0 - No pain       Body mass index is 83.18 kg/(m^2). Evaluation of Cognitive Function:  Mood/affect:normal mood with appropriate affect; tearful when speaking about her weight and her treatment  Appearance:well kempt  Family member/caregiver input:N/A    alert, well appearing, and in no distress, oriented to person, place, and time and morbidly obese    Patient Care Team:  Osmin Roberts MD as PCP - General (Family Practice)    End-of-life planning  Advanced Directive in the case than an injury or illness causes the patient to be unable to make health care decisions    Health Care Directive or Living Will: no    Advice/Referrals/Counselling/Plan:   Education and counseling provided:  End-of-Life planning (with patient's consent)  Diabetes screening test  weight loss, physical activity, fall prevention, and nutrition    ICD-10-CM ICD-9-CM    1. Essential hypertension I10 401.9 hydroCHLOROthiazide (HYDRODIURIL) 25 mg tablet   2. Morbid obesity with BMI of 70 and over, [facility] E66.01 278.01     Z68.45 V85.45    3. Prediabetes R73.03 790.29 metFORMIN ER (GLUCOPHAGE XR) 500 mg tablet   4. Medicare annual wellness visit, initial Z00.00 V70.0    5. Advance care planning Z71.89 V65.49    . Brief written plan, checklist    I have discussed the diagnosis with the patient and the intended plan as seen in the above orders. The patient has received an after-visit summary and questions were answered concerning future plans. I have discussed medication side effects and warnings with the patient as well. I have reviewed the plan of care with the patient, accepted their input and they are in agreement with the treatment goals.          Follow-up Disposition:  Return in about 1 month (around 3/21/2017) for weight management and blood pressure.      ____________________________________________________________    Problem Assessment    for treatment of   Chief Complaint   Patient presents with    Medication Evaluation         SUBJECTIVE  Hypertension  Patient is here for follow-up of hypertension. She started verapamil and had episode last Sunday when she is in a rest room and felt lightheaded and dizzy and fell. She hit her head on the stall. She denies losing consciousness. She stopped taking medications. Yesterday she felt dizzy and nausea. She did not vomit. She has had sick contacts in her home. She is exercising and is adherent to low salt diet. Blood pressure is not measured at home. Use of agents associated with hypertension: none. She started naltrexone for weight loss but did not have the accompanying medication bupropion due to cost.        Visit Vitals    /88    Pulse 90    Temp 98.6 °F (37 °C) (Oral)    Resp 20    Ht 5' 2\" (1.575 m)    Wt (!) 454 lb 12.8 oz (206.3 kg)    SpO2 97%    BMI 83.18 kg/m2     General:  Alert, cooperative, no distress, appears stated age. Morbidly obese   Head:  Normocephalic, without obvious abnormality, atraumatic. Eyes:  Conjunctivae/corneas clear. PERRL, EOMs intact. Ears:  Normal TMs and external ear canals both ears. Nose: Nares normal. Septum midline. Mucosa normal. No drainage or sinus tenderness. Throat: Lips, mucosa, and tongue normal. Teeth and gums normal.   Neck: Supple, symmetrical, trachea midline, no adenopathy, thyroid: no enlargement/tenderness/nodules   Back:   Symmetric, no curvature. ROM normal. No CVA tenderness. Lungs:   Clear to auscultation bilaterally. Chest wall:  No tenderness or deformity. Heart:  Regular rate and rhythm, S1, S2 normal, no murmur, click, rub or gallop. Breast Exam:  deferred   Abdomen:   Soft, non-tender.  Bowel sounds normal. No masses,  No organomegaly. Genitalia:  deferred   Rectal:  deferred   Extremities: Extremities normal, atraumatic, no cyanosis or edema. Pulses: 2+ and symmetric all extremities. Skin: Skin color, texture, turgor normal. No rashes or lesions. Lymph nodes: Cervical, supraclavicular, and axillary nodes normal.   Neurologic: CNII-XII intact. Normal strength, sensation and reflexes throughout. LABS   Hgb a1c 6.1  TSH 1.720      Assessment/Plan:    1. Essential hypertension  Pt had episode of dizziness unsure if it was medications causing symptom; goal <140/90; continue verapamil and start HCTZ  - hydroCHLOROthiazide (HYDRODIURIL) 25 mg tablet; Take 1 Tab by mouth daily. Dispense: 30 Tab; Refill: 1    2. Morbid obesity with BMI of 70 and over, adult ([facility]  Pt unable to obtain bupropion due to cost. Naltrexone not tolerated; stop naltrexone an encourage healthy diet and water exercise for low impact    3. Prediabetes  Discussed carbohydrate intake with patient. Start metformin for weight management  - metFORMIN ER (GLUCOPHAGE XR) 500 mg tablet; Take 1 Tab by mouth daily (with dinner). Dispense: 30 Tab; Refill: 1    4. Medicare annual wellness visit, initial  Reviewed preventive recommendations    5. Advance care planning  See ACP    I have reviewed/discussed the above normal BMI with the patient. I have recommended the following interventions: dietary management education, guidance, and counseling, dietary needs education and encourage exercise . The plan is as follows: I have counseled this patient on diet and exercise regimens.  .        Lab review: labs reviewed, I note that glycosylated hemoglobin mildly abnormal but acceptable, TSH normal

## 2017-02-23 NOTE — ACP (ADVANCE CARE PLANNING)
Advance Care Planning (ACP) Provider Note - Comprehensive     Date of ACP Conversation: 02/21/17  Persons included in Conversation:  patient  Length of ACP Conversation in minutes:  <16 minutes (Non-Billable)    Authorized Decision Maker (if patient is incapable of making informed decisions): This person is:  her mother          General ACP for ALL Patients with Decision Making Capacity:   Importance of advance care planning, including choosing a healthcare agent to communicate patient's healthcare decisions if patient lost the ability to make decisions, such as after a sudden illness or accident  Understanding of the healthcare agent role was assessed and information provided  Exploration of values, goals, and preferences if recovery is not expected, even with continued medical treatment in the event of: Imminent death  Severe, permanent brain injury  \"In these circumstances, what matters most to you? \"  Care focused more on comfort or quality of life. \"What, if any, treatments would you want to avoid? \" none    Review of Existing Advance Directive:  What information were you given about medical decisions to consider before completing your advance directive? none    For Serious or Chronic Illness:  No known illness    Interventions Provided:  Recommended completion of Advance Directive form after review of ACP materials and conversation with prospective healthcare agent

## 2017-03-16 ENCOUNTER — TELEPHONE (OUTPATIENT)
Dept: FAMILY MEDICINE CLINIC | Age: 27
End: 2017-03-16

## 2017-03-29 ENCOUNTER — OFFICE VISIT (OUTPATIENT)
Dept: FAMILY MEDICINE CLINIC | Age: 27
End: 2017-03-29

## 2017-03-29 VITALS
DIASTOLIC BLOOD PRESSURE: 92 MMHG | BODY MASS INDEX: 53.92 KG/M2 | HEART RATE: 104 BPM | HEIGHT: 62 IN | WEIGHT: 293 LBS | RESPIRATION RATE: 18 BRPM | OXYGEN SATURATION: 95 % | TEMPERATURE: 97.2 F | SYSTOLIC BLOOD PRESSURE: 128 MMHG

## 2017-03-29 DIAGNOSIS — I10 ESSENTIAL HYPERTENSION: ICD-10-CM

## 2017-03-29 DIAGNOSIS — J06.9 VIRAL UPPER RESPIRATORY INFECTION: Primary | ICD-10-CM

## 2017-03-29 DIAGNOSIS — E66.01 MORBID OBESITY WITH BMI OF 70 AND OVER, ADULT (HCC): ICD-10-CM

## 2017-03-29 DIAGNOSIS — H52.13 MYOPIA, BILATERAL: ICD-10-CM

## 2017-03-29 NOTE — PROGRESS NOTES
1. Have you been to the ER, urgent care clinic since your last visit? Hospitalized since your last visit? No    2. Have you seen or consulted any other health care providers outside of the [address] [place] [address] since your last visit? Include any pap smears or colon screening.  No

## 2017-03-29 NOTE — PROGRESS NOTES
Chris Londono is a 23 y. o.  female and presents with    Chief Complaint   Patient presents with    Weight Management    Cough       Subjective:  Upper Respiratory Infection  Patient complains of symptoms of a URI. Symptoms include congestion, sore throat, fever and cough. Onset of symptoms was 1 week ago, gradually improving since that time. She also c/o sneezing for the past 4 days . She is drinking plenty of fluids. . Evaluation to date: none. Treatment to date: otc cough syrup. She is also here for weight management. She has been taking metformin. She has been exercising intermittently. She has changed her diet and is drinking water. She is requesting referral to eye doctor. Patient Active Problem List   Diagnosis Code    Essential hypertension I10    Prediabetes R73.03    Morbid obesity with BMI of 70 and over, adult [facility] E66.01, Z68.45    Advance care planning Z71.89     Patient Active Problem List    Diagnosis Date Noted    Prediabetes 02/21/2017    Morbid obesity with BMI of 70 and over, adult ([facility] 02/21/2017    Advance care planning 02/21/2017    Essential hypertension 02/07/2017     Current Outpatient Prescriptions   Medication Sig Dispense Refill    metFORMIN ER (GLUCOPHAGE XR) 500 mg tablet Take 1 Tab by mouth daily (with dinner). 30 Tab 1    hydroCHLOROthiazide (HYDRODIURIL) 25 mg tablet Take 1 Tab by mouth daily. 30 Tab 1    verapamil ER (VERELAN PM) 100 mg capsule Take 1 Cap by mouth nightly. 30 Cap 0     Allergies   Allergen Reactions    Phentermine Angioedema     Past Medical History:   Diagnosis Date    HTN (hypertension)     Morbid obesity with BMI of 70 and over, adult [facility])      History reviewed. No pertinent surgical history.   Family History   Problem Relation Age of Onset    No Known Problems Mother     No Known Problems Father      Social History   Substance Use Topics    Smoking status: Never Smoker    Smokeless tobacco: Not on file    Alcohol use No       ROS   General ROS: negative for - night sweats  Psychological ROS: negative for - anxiety or depression  Ophthalmic ROS: positive for - uses glasses  ENT ROS: negative for - headaches  Endocrine ROS: negative for - polydipsia/polyuria or temperature intolerance  Breast ROS: negative for breast lumps  Respiratory ROS: no shortness of breath, or wheezing  Cardiovascular ROS: no chest pain or dyspnea on exertion  Gastrointestinal ROS: no abdominal pain, change in bowel habits, or black or bloody stools  Genito-Urinary ROS: no dysuria, trouble voiding, or hematuria  Dermatological ROS: negative for - rash or skin lesion changes    All other systems reviewed and are negative. Objective:  Vitals:    03/29/17 0937 03/29/17 0943   BP: (!) 163/94 (!) 128/92   Pulse: (!) 104    Resp: 18    Temp: 97.2 °F (36.2 °C)    TempSrc: Oral    SpO2: 95%    Weight: (!) 449 lb 6.4 oz (203.8 kg)    Height: 5' 2\" (1.575 m)    PainSc:   0 - No pain        alert, well appearing, and in no distress, oriented to person, place, and time and morbidly obese  Mental status - normal mood, behavior, speech, dress, motor activity, and thought processes  Mouth - mucous membranes moist, pharynx normal without lesions  Chest - clear to auscultation, no wheezes, rales or rhonchi, symmetric air entry  Heart - normal rate, regular rhythm, normal S1, S2, no murmurs, rubs, clicks or gallops  Neurological - normal gait and station    Assessment/Plan:    1. Viral upper respiratory infection  Increase fluid intake and rest  2. Myopia, bilateral    - REFERRAL TO OPHTHALMOLOGY    3. Essential hypertension  Goal <140/90; encourage healthy diet and exercise    4.  Morbid obesity with BMI of 70 and over, adult ([facility]  Weight loss with metformin and lifestyle change; encourage regular exercise at least 3 times a week for 30 minutes      Lab review: no lab studies available for review at time of visit      I have discussed the diagnosis with the patient and the intended plan as seen in the above orders. The patient has received an after-visit summary and questions were answered concerning future plans. I have discussed medication side effects and warnings with the patient as well. I have reviewed the plan of care with the patient, accepted their input and they are in agreement with the treatment goals. Follow-up Disposition:  Return in about 1 month (around 4/29/2017), or if symptoms worsen or fail to improve, for weight management.

## 2017-03-29 NOTE — MR AVS SNAPSHOT
Visit Information Date & Time Provider Department Dept. Phone Encounter #  
 3/29/2017  9:15 AM Sera DanNick [phone] [id number] Follow-up Instructions Return in about 1 month (around 4/29/2017), or if symptoms worsen or fail to improve, for weight management. Upcoming Health Maintenance Date Due  
 HPV AGE 9Y-34Y (1 of 3 - Female 3 Dose Series) 6/5/2002 DTaP/Tdap/Td series (1 - Tdap) 6/5/2012 PAP AKA CERVICAL CYTOLOGY 6/5/2012 Allergies as of 3/29/2017  Review Complete On: 3/29/2017 By: Sera Dan MD  
  
 Severity Noted Reaction Type Reactions Phentermine  02/07/2017    Angioedema Current Immunizations  Never Reviewed No immunizations on file. Not reviewed this visit You Were Diagnosed With   
  
 Codes Comments Viral upper respiratory infection    -  Primary ICD-10-CM: J06.9, B97.89 ICD-9-CM: 465.9 Myopia, bilateral     ICD-10-CM: H52.13 ICD-9-CM: 367.1 Essential hypertension     ICD-10-CM: I10 
ICD-9-CM: 401.9 Morbid obesity with BMI of 70 and over, adult [facility])     ICD-10-CM: E66.01, Z68.45 ICD-9-CM: 278.01, V85.45 Vitals BP Pulse Temp Resp Height(growth percentile) Weight(growth percentile) (!) 128/92 (BP 1 Location: Right arm, BP Patient Position: Sitting) (!) 104 97.2 °F (36.2 °C) (Oral) 18 5' 2\" (1.575 m) (!) 449 lb 6.4 oz (203.8 kg) SpO2 BMI OB Status Smoking Status 95% 82.2 kg/m2 Unknown Never Smoker Vitals History BMI and BSA Data Body Mass Index Body Surface Area  
 82.2 kg/m 2 2.99 m 2 Preferred Pharmacy Pharmacy Name Phone [facility] [address], [place] [phone] Your Updated Medication List  
  
   
This list is accurate as of: 3/29/17  9:59 AM.  Always use your most recent med list.  
  
  
  
  
 hydroCHLOROthiazide 25 mg tablet Commonly known as:  HYDRODIURIL  
 Take 1 Tab by mouth daily. metFORMIN  mg tablet Commonly known as:  GLUCOPHAGE XR Take 1 Tab by mouth daily (with dinner). verapamil  mg capsule Commonly known as:  VERELAN PM  
Take 1 Cap by mouth nightly. We Performed the Following REFERRAL TO OPHTHALMOLOGY [REF57 Custom] Comments:  
 Please evaluate 22 y/o female patient for annual eye exam.  
  
Follow-up Instructions Return in about 1 month (around 4/29/2017), or if symptoms worsen or fail to improve, for weight management. Referral Information Referral ID Referred By Referred To  
  
 [id number] MD [address]   
   [address] [facility] Phone: [phone] Fax: [phone] Visits Status Start Date End Date 1 New Request 3/29/17 3/29/18 If your referral has a status of pending review or denied, additional information will be sent to support the outcome of this decision. Patient Instructions Upper Respiratory Infection (Cold): Care Instructions Your Care Instructions An upper respiratory infection, or URI, is an infection of the nose, sinuses, or throat. URIs are spread by coughs, sneezes, and direct contact. The common cold is the most frequent kind of URI. The flu and sinus infections are other kinds of URIs. Almost all URIs are caused by viruses. Antibiotics won't cure them. But you can treat most infections with home care. This may include drinking lots of fluids and taking over-the-counter pain medicine. You will probably feel better in 4 to 10 days. The doctor has checked you carefully, but problems can develop later. If you notice any problems or new symptoms, get medical treatment right away. Follow-up care is a key part of your treatment and safety.  Be sure to make and go to all appointments, and call your doctor if you are having problems. It's also a good idea to know your test results and keep a list of the medicines you take. How can you care for yourself at home? · To prevent dehydration, drink plenty of fluids, enough so that your urine is light yellow or clear like water. Choose water and other caffeine-free clear liquids until you feel better. If you have kidney, heart, or liver disease and have to limit fluids, talk with your doctor before you increase the amount of fluids you drink. · Take an over-the-counter pain medicine, such as acetaminophen (Tylenol), ibuprofen (Advil, Motrin), or naproxen (Aleve). Read and follow all instructions on the label. · Before you use cough and cold medicines, check the label. These medicines may not be safe for young children or for people with certain health problems. · Be careful when taking over-the-counter cold or flu medicines and Tylenol at the same time. Many of these medicines have acetaminophen, which is Tylenol. Read the labels to make sure that you are not taking more than the recommended dose. Too much acetaminophen (Tylenol) can be harmful. · Get plenty of rest. 
· Do not smoke or allow others to smoke around you. If you need help quitting, talk to your doctor about stop-smoking programs and medicines. These can increase your chances of quitting for good. When should you call for help? Call 911 anytime you think you may need emergency care. For example, call if: 
· You have severe trouble breathing. Call your doctor now or seek immediate medical care if: 
· You seem to be getting much sicker. · You have new or worse trouble breathing. · You have a new or higher fever. · You have a new rash. Watch closely for changes in your health, and be sure to contact your doctor if: 
· You have a new symptom, such as a sore throat, an earache, or sinus pain. · You cough more deeply or more often, especially if you notice more mucus or a change in the color of your mucus. · You do not get better as expected. Where can you learn more? Go to http://chaya-eris.info/. Enter I328 in the search box to learn more about \"Upper Respiratory Infection (Cold): Care Instructions. \" Current as of: June 30, 2016 Content Version: 11.2 © 9805-2019 [employer]. Care instructions adapted under license by [employer] (which disclaims liability or warranty for this information). If you have questions about a medical condition or this instruction, always ask your healthcare professional. [address] any warranty or liability for your use of this information. Introducing [facility]! Samia Balderas introduces Frontback patient portal. Now you can access parts of your medical record, email your doctor's office, and request medication refills online. 1. In your internet browser, go to [email or web address] 2. Click on the First Time User? Click Here link in the Sign In box. You will see the New Member Sign Up page. 3. Enter your Frontback Access Code exactly as it appears below. You will not need to use this code after youve completed the sign-up process. If you do not sign up before the expiration date, you must request a new code. · Frontback Access Code: [email or web address] Expires: 5/8/2017  9:38 AM 
 
4. Enter the last four digits of your Social Security Number (xxxx) and Date of Birth (mm/dd/yyyy) as indicated and click Submit. You will be taken to the next sign-up page. 5. Create a [employer] ID. This will be your Frontback login ID and cannot be changed, so think of one that is secure and easy to remember. 6. Create a [employer] password. You can change your password at any time. 7. Enter your Password Reset Question and Answer. This can be used at a later time if you forget your password. 8. Enter your e-mail address. You will receive e-mail notification when new information is available in [address] 9. Click Sign Up. You can now view and download portions of your medical record. 10. Click the Download Summary menu link to download a portable copy of your medical information. If you have questions, please visit the Frequently Asked Questions section of the [employer] website. Remember, [employer] is NOT to be used for urgent needs. For medical emergencies, dial 911. Now available from your iPhone and Android! Please provide this summary of care documentation to your next provider. Your primary care clinician is listed as Joaquim Giordano. If you have any questions after today's visit, please call [phone].

## 2017-03-29 NOTE — PATIENT INSTRUCTIONS

## 2017-04-25 ENCOUNTER — TELEPHONE (OUTPATIENT)
Dept: FAMILY MEDICINE CLINIC | Age: 27
End: 2017-04-25

## 2017-04-25 NOTE — TELEPHONE ENCOUNTER
Office is calling regarding patients referral for eye. There are questions about the ICD10 because it is a routine office visit procedure. Then the referral was put in Dr Arcola Dakin name but the patient is seeing Dr. Dean Jackson.      Please assist.

## 2017-04-25 NOTE — TELEPHONE ENCOUNTER
I returned Ksenia's call she stated there is no issue with referral. Patient initially scheduled an appointment with Dr. Honorio Harvey, but later rescheduled with Dr. Gavin Valle. I advised I would send over an authorization for Dr. Gavin Valle just to make sure there would be no issues. Mirella stated an understanding. Dr. Og Rhodes, Dr. Honorio Harvey, and Dr. Gavin Valle are all in the same office.

## 2017-04-27 ENCOUNTER — OFFICE VISIT (OUTPATIENT)
Dept: FAMILY MEDICINE CLINIC | Age: 27
End: 2017-04-27

## 2017-04-27 VITALS
HEIGHT: 62 IN | DIASTOLIC BLOOD PRESSURE: 100 MMHG | WEIGHT: 293 LBS | BODY MASS INDEX: 53.92 KG/M2 | SYSTOLIC BLOOD PRESSURE: 160 MMHG | RESPIRATION RATE: 17 BRPM | OXYGEN SATURATION: 96 % | HEART RATE: 96 BPM | TEMPERATURE: 97.8 F

## 2017-04-27 DIAGNOSIS — E66.01 MORBID OBESITY WITH BMI OF 70 AND OVER, ADULT (HCC): ICD-10-CM

## 2017-04-27 DIAGNOSIS — I10 ESSENTIAL HYPERTENSION: ICD-10-CM

## 2017-04-27 DIAGNOSIS — R55 SYNCOPE AND COLLAPSE: Primary | ICD-10-CM

## 2017-04-27 RX ORDER — HYDROCHLOROTHIAZIDE 25 MG/1
25 TABLET ORAL DAILY
Qty: 30 TAB | Refills: 3 | Status: SHIPPED | OUTPATIENT
Start: 2017-04-27 | End: 2017-10-27 | Stop reason: SDUPTHER

## 2017-04-27 RX ORDER — BUPROPION HYDROCHLORIDE 100 MG/1
100 TABLET ORAL 2 TIMES DAILY
Qty: 60 TAB | Refills: 0 | Status: SHIPPED | OUTPATIENT
Start: 2017-04-27 | End: 2017-05-03 | Stop reason: SDUPTHER

## 2017-04-27 RX ORDER — VERAPAMIL HYDROCHLORIDE 100 MG/1
100 CAPSULE, EXTENDED RELEASE ORAL
Qty: 30 CAP | Refills: 3 | Status: SHIPPED | OUTPATIENT
Start: 2017-04-27 | End: 2017-10-27 | Stop reason: SDUPTHER

## 2017-04-27 RX ORDER — NALTREXONE HYDROCHLORIDE 50 MG/1
25 TABLET, FILM COATED ORAL 2 TIMES DAILY
Qty: 30 TAB | Refills: 0 | Status: SHIPPED | OUTPATIENT
Start: 2017-04-27 | End: 2017-05-26 | Stop reason: SDUPTHER

## 2017-04-27 NOTE — PATIENT INSTRUCTIONS

## 2017-04-27 NOTE — MR AVS SNAPSHOT
Visit Information Date & Time Provider Department Dept. Phone Encounter #  
 4/27/2017 11:45 AM Calixto Barreto, [address] [facility] [phone] [id number] Follow-up Instructions Return in about 30 days (around 5/27/2017), or if symptoms worsen or fail to improve, for weight management. Upcoming Health Maintenance Date Due  
 HPV AGE 9Y-34Y (1 of 3 - Female 3 Dose Series) 6/5/2002 DTaP/Tdap/Td series (1 - Tdap) 6/5/2012 PAP AKA CERVICAL CYTOLOGY 6/5/2012 Allergies as of 4/27/2017  Review Complete On: 4/27/2017 By: Calixto Barreto MD  
  
 Severity Noted Reaction Type Reactions Phentermine  02/07/2017    Angioedema Current Immunizations  Never Reviewed No immunizations on file. Not reviewed this visit You Were Diagnosed With   
  
 Codes Comments Syncope and collapse    -  Primary ICD-10-CM: R55 
ICD-9-CM: 780.2 Morbid obesity with BMI of 70 and over, adult [facility])     ICD-10-CM: E66.01, Z68.45 ICD-9-CM: 278.01, V85.45 Essential hypertension     ICD-10-CM: I10 
ICD-9-CM: 401.9 Vitals BP Pulse Temp Resp Height(growth percentile) Weight(growth percentile) (!) 160/100 (BP 1 Location: Right arm, BP Patient Position: Sitting) 96 97.8 °F (36.6 °C) (Oral) 17 5' 2\" (1.575 m) (!) 452 lb (205 kg) SpO2 BMI OB Status Smoking Status 96% 82.67 kg/m2 Unknown Never Smoker BMI and BSA Data Body Mass Index Body Surface Area  
 82.67 kg/m 2 2.99 m 2 Preferred Pharmacy Pharmacy Name Phone [facility] [address], [address] [place] [address] [phone] Your Updated Medication List  
  
   
This list is accurate as of: 4/27/17 12:26 PM.  Always use your most recent med list.  
  
  
  
  
 buPROPion 100 mg tablet Commonly known as:  [facility] Take 1 Tab by mouth two (2) times a day. hydroCHLOROthiazide 25 mg tablet Commonly known as:  HYDRODIURIL Take 1 Tab by mouth daily. metFORMIN  mg tablet Commonly known as:  GLUCOPHAGE XR Take 1 Tab by mouth daily (with dinner). naltrexone 50 mg tablet Commonly known as:  DEPADE Take 0.5 Tabs by mouth two (2) times a day. verapamil  mg capsule Commonly known as:  VERELAN PM  
Take 1 Cap by mouth nightly. Prescriptions Sent to Pharmacy Refills  
 hydroCHLOROthiazide (HYDRODIURIL) 25 mg tablet 3 Sig: Take 1 Tab by mouth daily. Class: Normal  
 Pharmacy: [facility] [address], [address] Ph #: [phone] Route: Oral  
 verapamil ER (VERELAN PM) 100 mg capsule 3 Sig: Take 1 Cap by mouth nightly. Class: Normal  
 Pharmacy: [address], [address] Ph #: [phone] Route: Oral  
 buPROPion (WELLBUTRIN) 100 mg tablet 0 Sig: Take 1 Tab by mouth two (2) times a day. Class: Normal  
 Pharmacy: [facility] [address], [address] Ph #: [phone] Route: Oral  
 naltrexone (DEPADE) 50 mg tablet 0 Sig: Take 0.5 Tabs by mouth two (2) times a day. Class: Normal  
 Pharmacy: [address], [address] Ph #: [phone] Route: Oral  
  
Follow-up Instructions Return in about 30 days (around 5/27/2017), or if symptoms worsen or fail to improve, for weight management. Patient Instructions Fainting: Care Instructions Your Care Instructions When you faint, or pass out, you lose consciousness for a short time. A brief drop in blood flow to the brain often causes it. When you fall or lie down, more blood flows to your brain and you regain consciousness. Emotional stress, pain, or overheatingespecially if you have been standingcan make you faint. In these cases, fainting is usually not serious. But fainting can be a sign of a more serious problem. Your doctor may want you to have more tests to rule out other causes. The treatment you need depends on the reason why you fainted. The doctor has checked you carefully, but problems can develop later. If you notice any problems or new symptoms, get medical treatment right away. Follow-up care is a key part of your treatment and safety. Be sure to make and go to all appointments, and call your doctor if you are having problems. It's also a good idea to know your test results and keep a list of the medicines you take. How can you care for yourself at home? · Drink plenty of fluids to prevent dehydration. If you have kidney, heart, or liver disease and have to limit fluids, talk with your doctor before you increase your fluid intake. When should you call for help? Call 911 anytime you think you may need emergency care. For example, call if: 
· You have symptoms of a heart problem. These may include: ¨ Chest pain or pressure. ¨ Severe trouble breathing. ¨ A fast or irregular heartbeat. ¨ Lightheadedness or sudden weakness. ¨ Coughing up pink, foamy mucus. ¨ Passing out. After you call 911, the  may tell you to chew 1 adult-strength or 2 to 4 low-dose aspirin. Wait for an ambulance. Do not try to drive yourself. · You have symptoms of a stroke. These may include: 
¨ Sudden numbness, tingling, weakness, or loss of movement in your face, arm, or leg, especially on only one side of your body. ¨ Sudden vision changes. ¨ Sudden trouble speaking. ¨ Sudden confusion or trouble understanding simple statements. ¨ Sudden problems with walking or balance. ¨ A sudden, severe headache that is different from past headaches. · You passed out (lost consciousness) again. Watch closely for changes in your health, and be sure to contact your doctor if: 
· You do not get better as expected. Where can you learn more? Go to http://chaya-eirs.info/. Enter Y253 in the search box to learn more about \"Fainting: Care Instructions. \" Current as of: May 27, 2016 Content Version: 11.2 © 4466-7029 [employer], [employer]. Care instructions adapted under license by [employer] (which disclaims liability or warranty for this information). If you have questions about a medical condition or this instruction, always ask your healthcare professional. [address] any warranty or liability for your use of this information. Introducing [facility] & HEALTH SERVICES! Kt Pichardo introduces Adcade patient portal. Now you can access parts of your medical record, email your doctor's office, and request medication refills online. 1. In your internet browser, go to [email or web address] 2. Click on the First Time User? Click Here link in the Sign In box. You will see the New Member Sign Up page. 3. Enter your Adcade Access Code exactly as it appears below. You will not need to use this code after youve completed the sign-up process. If you do not sign up before the expiration date, you must request a new code. · Adcade Access Code: [email or web address] Expires: 5/8/2017  9:38 AM 
 
4. Enter the last four digits of your Social Security Number (xxxx) and Date of Birth (mm/dd/yyyy) as indicated and click Submit. You will be taken to the next sign-up page. 5. Create a Adcade ID. This will be your Adcade login ID and cannot be changed, so think of one that is secure and easy to remember. 6. Create a Adcade password. You can change your password at any time. 7. Enter your Password Reset Question and Answer. This can be used at a later time if you forget your password. 8. Enter your e-mail address. You will receive e-mail notification when new information is available in [address] 9. Click Sign Up. You can now view and download portions of your medical record. 10. Click the Download Summary menu link to download a portable copy of your medical information.  
 
If you have questions, please visit the Frequently Asked Questions section of the Rec[employer]. Remember, Frequencyhart is NOT to be used for urgent needs. For medical emergencies, dial 911. Now available from your iPhone and Android! Please provide this summary of care documentation to your next provider. Your primary care clinician is listed as Hafsaverly Cowden. If you have any questions after today's visit, please call [phone].

## 2017-04-27 NOTE — PROGRESS NOTES
Derick Dan is a 23 y.o female that is present in the office for a routine 1 month follow up for weight management. 1. Have you been to the ER, urgent care clinic since your last visit? Hospitalized since your last visit? Yes When: Mnadie Guaman 4/20/2017    2. Have you seen or consulted any other health care providers outside of the [address] [place] [address] since your last visit? Include any pap smears or colon screening.  No

## 2017-04-27 NOTE — PROGRESS NOTES
Cristina Rinaldi is a 23 y. o.  female and presents with    Chief Complaint   Patient presents with    Weight Management    Loss of Consciousness     Subjective:  Syncope  Patient complains of syncope. Onset was 1 week ago, with improving course since that time. Patient describes the episode as sudden loss of consciousness without warning. Patient also has associated symptoms of  Chest pain at the time of the event. She was at the gym working out in the pool and when she got out of the water she had right side pain shooting to her chest and right abdomen. When she walked to the bathroom she leaned against the wall and lost consciousness. She has not had another similar episode. She returned to the gym 2 days ago and did not work out as long as before. The patient denies visual aura, headache, nausea. Taking culprit meds?: hypoglycemic agents    She is not taking medication for hypertension; she ran out of medication 2 weeks ago. She denies current chest pain or shortness of breath. Patient Active Problem List   Diagnosis Code    Essential hypertension I10    Prediabetes R73.03    Morbid obesity with BMI of 70 and over, adult ([facility] E66.01, Z68.45    Advance care planning Z71.89     Patient Active Problem List    Diagnosis Date Noted    Prediabetes 02/21/2017    Morbid obesity with BMI of 70 and over, adult ([facility] 02/21/2017    Advance care planning 02/21/2017    Essential hypertension 02/07/2017     Current Outpatient Prescriptions   Medication Sig Dispense Refill    metFORMIN ER (GLUCOPHAGE XR) 500 mg tablet Take 1 Tab by mouth daily (with dinner). 30 Tab 1    hydroCHLOROthiazide (HYDRODIURIL) 25 mg tablet Take 1 Tab by mouth daily. 30 Tab 1    verapamil ER (VERELAN PM) 100 mg capsule Take 1 Cap by mouth nightly.  30 Cap 0     Allergies   Allergen Reactions    Phentermine Angioedema     Past Medical History:   Diagnosis Date    HTN (hypertension)     Morbid obesity with BMI of 70 and over, adult [facility])      No past surgical history on file. Family History   Problem Relation Age of Onset    No Known Problems Mother     No Known Problems Father      Social History   Substance Use Topics    Smoking status: Never Smoker    Smokeless tobacco: Never Used    Alcohol use No       ROS   General ROS: negative for - night sweats  Psychological ROS: negative for - anxiety or depression  Ophthalmic ROS: positive for - uses glasses  ENT ROS: negative for - headaches  Endocrine ROS: negative for - polydipsia/polyuria or temperature intolerance  Breast ROS: negative for breast lumps  Respiratory ROS: no shortness of breath, or wheezing  Cardiovascular ROS: no chest pain or dyspnea on exertion  Gastrointestinal ROS: no abdominal pain, change in bowel habits, or black or bloody stools  Genito-Urinary ROS: no dysuria, trouble voiding, or hematuria  Dermatological ROS: negative for - rash or skin lesion changes    All other systems reviewed and are negative. Objective:  Vitals:    04/27/17 1154   BP: (!) 160/100   Pulse: 96   Resp: 17   Temp: 97.8 °F (36.6 °C)   TempSrc: Oral   SpO2: 96%   Weight: (!) 452 lb (205 kg)   Height: 5' 2\" (1.575 m)   PainSc:   0 - No pain     alert, well appearing, and in no distress, oriented to person, place, and time and morbidly obese  Mental status - normal mood, behavior, speech, dress, motor activity, and thought processes  Mouth - mucous membranes moist, pharynx normal without lesions  Chest - clear to auscultation, no wheezes, rales or rhonchi, symmetric air entry  Heart - normal rate, regular rhythm, normal S1, S2, no murmurs, rubs, clicks or gallops  Neurological - normal gait and station    LABS   Glucose 95  hgb 13  TESTS  Chest xray  Impression:  No active cardiopulmonary disease. Obesity. Assessment/Plan:    1.  Syncope and collapse  Evaluated in ER; no further episode; likely exertional after water aerobics; stay hydrated and go slow with work-out intensity    2. Morbid obesity with BMI of 70 and over, adult ([facility]  I have reviewed/discussed the above normal BMI with the patient. I have recommended the following interventions: dietary management education, guidance, and counseling and encourage exercise . start combination medication.     - buPROPion (WELLBUTRIN) 100 mg tablet; Take 1 Tab by mouth two (2) times a day. Dispense: 60 Tab; Refill: 0  - naltrexone (DEPADE) 50 mg tablet; Take 0.5 Tabs by mouth two (2) times a day. Dispense: 30 Tab; Refill: 0    3. Essential hypertension  Goal <140/90; noncompliant due to lack of medications; restart  - hydroCHLOROthiazide (HYDRODIURIL) 25 mg tablet; Take 1 Tab by mouth daily. Dispense: 30 Tab; Refill: 3  - verapamil ER (VERELAN PM) 100 mg capsule; Take 1 Cap by mouth nightly. Dispense: 30 Cap; Refill: 3      Lab review: labs reviewed, I note that hemogram normal, comprehensive metabolic panel normal      I have discussed the diagnosis with the patient and the intended plan as seen in the above orders. The patient has received an after-visit summary and questions were answered concerning future plans. I have discussed medication side effects and warnings with the patient as well. I have reviewed the plan of care with the patient, accepted their input and they are in agreement with the treatment goals. Follow-up Disposition:  Return in about 30 days (around 5/27/2017), or if symptoms worsen or fail to improve, for weight management. More than 1/2 of this 25 minute visit was spent in counselling and coordination of care, as described above.

## 2017-05-03 DIAGNOSIS — E66.01 MORBID OBESITY WITH BMI OF 70 AND OVER, ADULT (HCC): ICD-10-CM

## 2017-05-03 RX ORDER — BUPROPION HYDROCHLORIDE 100 MG/1
100 TABLET ORAL 2 TIMES DAILY
Qty: 60 TAB | Refills: 0 | Status: SHIPPED | OUTPATIENT
Start: 2017-05-03 | End: 2017-05-26 | Stop reason: SDUPTHER

## 2017-05-03 NOTE — TELEPHONE ENCOUNTER
Patient contacted the office and stated that the pharmacy did not receive all her medications that were sent on 4/27/2017. The only medication that was not received was the Wellbutrin 100mg tablet. Contacted [facility] and it was confirmed that everything else was received. Pharmacy just needs it resent. Please advise.

## 2017-05-26 ENCOUNTER — OFFICE VISIT (OUTPATIENT)
Dept: FAMILY MEDICINE CLINIC | Age: 27
End: 2017-05-26

## 2017-05-26 VITALS
BODY MASS INDEX: 53.92 KG/M2 | WEIGHT: 293 LBS | DIASTOLIC BLOOD PRESSURE: 88 MMHG | HEIGHT: 62 IN | SYSTOLIC BLOOD PRESSURE: 138 MMHG | HEART RATE: 75 BPM | RESPIRATION RATE: 16 BRPM | OXYGEN SATURATION: 96 % | TEMPERATURE: 97.1 F

## 2017-05-26 DIAGNOSIS — E66.01 MORBID OBESITY WITH BMI OF 70 AND OVER, ADULT (HCC): ICD-10-CM

## 2017-05-26 DIAGNOSIS — I10 ESSENTIAL HYPERTENSION: Primary | ICD-10-CM

## 2017-05-26 RX ORDER — BUPROPION HYDROCHLORIDE 100 MG/1
100 TABLET ORAL 2 TIMES DAILY
Qty: 60 TAB | Refills: 2 | Status: SHIPPED | OUTPATIENT
Start: 2017-05-26 | End: 2017-08-25 | Stop reason: SDUPTHER

## 2017-05-26 RX ORDER — NALTREXONE HYDROCHLORIDE 50 MG/1
25 TABLET, FILM COATED ORAL 2 TIMES DAILY
Qty: 30 TAB | Refills: 0 | Status: SHIPPED | OUTPATIENT
Start: 2017-05-26 | End: 2017-05-26 | Stop reason: SDUPTHER

## 2017-05-26 RX ORDER — NALTREXONE HYDROCHLORIDE 50 MG/1
25 TABLET, FILM COATED ORAL 2 TIMES DAILY
Qty: 30 TAB | Refills: 2 | Status: SHIPPED | OUTPATIENT
Start: 2017-05-26 | End: 2017-08-25 | Stop reason: SDUPTHER

## 2017-05-26 RX ORDER — BUPROPION HYDROCHLORIDE 100 MG/1
100 TABLET ORAL 2 TIMES DAILY
Qty: 60 TAB | Refills: 0 | Status: SHIPPED | OUTPATIENT
Start: 2017-05-26 | End: 2017-05-26 | Stop reason: SDUPTHER

## 2017-05-26 NOTE — MR AVS SNAPSHOT
Visit Information Date & Time Provider Department Dept. Phone Encounter #  
 5/26/2017 11:45 AM Calixto Barreto, [phone] [id number] Follow-up Instructions Return in about 3 months (around 8/26/2017) for medication evaluation. Upcoming Health Maintenance Date Due  
 HPV AGE 9Y-34Y (1 of 3 - Female 3 Dose Series) 6/5/2002 DTaP/Tdap/Td series (1 - Tdap) 6/5/2012 PAP AKA CERVICAL CYTOLOGY 6/5/2012 INFLUENZA AGE 9 TO ADULT 8/1/2017 Allergies as of 5/26/2017  Review Complete On: 5/26/2017 By: Calixto Barreto MD  
  
 Severity Noted Reaction Type Reactions Phentermine  02/07/2017    Angioedema Current Immunizations  Never Reviewed No immunizations on file. Not reviewed this visit You Were Diagnosed With   
  
 Codes Comments Essential hypertension    -  Primary ICD-10-CM: I10 
ICD-9-CM: 401.9 Morbid obesity with BMI of 70 and over, adult [facility])     ICD-10-CM: E66.01, Z68.45 ICD-9-CM: 278.01, V85.45 Vitals BP Pulse Temp Resp Height(growth percentile) Weight(growth percentile) 138/88 (BP 1 Location: Left arm, BP Patient Position: Sitting) 75 97.1 °F (36.2 °C) (Oral) 16 5' 2\" (1.575 m) (!) 443 lb 12.8 oz (201.3 kg) SpO2 BMI OB Status Smoking Status 96% 81.17 kg/m2 Unknown Never Smoker Vitals History BMI and BSA Data Body Mass Index Body Surface Area  
 81.17 kg/m 2 2.97 m 2 Preferred Pharmacy Pharmacy Name Phone [facility] [address], [address] [phone] Your Updated Medication List  
  
   
This list is accurate as of: 5/26/17  1:09 PM.  Always use your most recent med list.  
  
  
  
  
 buPROPion 100 mg tablet Commonly known as:  [facility] Take 1 Tab by mouth two (2) times a day. hydroCHLOROthiazide 25 mg tablet Commonly known as:  HYDRODIURIL Take 1 Tab by mouth daily. metFORMIN  mg tablet Commonly known as:  GLUCOPHAGE XR Take 1 Tab by mouth daily (with dinner). naltrexone 50 mg tablet Commonly known as:  DEPADE Take 0.5 Tabs by mouth two (2) times a day. verapamil  mg capsule Commonly known as:  VERELAN PM  
Take 1 Cap by mouth nightly. Prescriptions Sent to Pharmacy Refills buPROPion (WELLBUTRIN) 100 mg tablet 2 Sig: Take 1 Tab by mouth two (2) times a day. Class: Normal  
 Pharmacy: [facility] [address], [address] Ph #: [phone] Route: Oral  
 naltrexone (DEPADE) 50 mg tablet 2 Sig: Take 0.5 Tabs by mouth two (2) times a day. Class: Normal  
 Pharmacy: [facility] [address], [address] Ph #: [phone] Route: Oral  
  
Follow-up Instructions Return in about 3 months (around 8/26/2017) for medication evaluation. Introducing [facility] SERVICES! Lindsay Veliz introduces GeeYuu patient portal. Now you can access parts of your medical record, email your doctor's office, and request medication refills online. 1. In your internet browser, go to [email or web address] 2. Click on the First Time User? Click Here link in the Sign In box. You will see the New Member Sign Up page. 3. Enter your GeeYuu Access Code exactly as it appears below. You will not need to use this code after youve completed the sign-up process. If you do not sign up before the expiration date, you must request a new code. · GeeYuu Access Code: [email or web address] Expires: 8/24/2017 12:25 PM 
 
4. Enter the last four digits of your Social Security Number (xxxx) and Date of Birth (mm/dd/yyyy) as indicated and click Submit. You will be taken to the next sign-up page. 5. Create a Responsyst ID. This will be your GeeYuu login ID and cannot be changed, so think of one that is secure and easy to remember. 6. Create a GeeYuu password. You can change your password at any time. 7. Enter your Password Reset Question and Answer. This can be used at a later time if you forget your password. 8. Enter your e-mail address. You will receive e-mail notification when new information is available in [address] 9. Click Sign Up. You can now view and download portions of your medical record. 10. Click the Download Summary menu link to download a portable copy of your medical information. If you have questions, please visit the Frequently Asked Questions section of the [employer] website. Remember, [employer] is NOT to be used for urgent needs. For medical emergencies, dial 911. Now available from your iPhone and Android! Please provide this summary of care documentation to your next provider. Your primary care clinician is listed as Blayne Gabriel. If you have any questions after today's visit, please call [phone].

## 2017-05-26 NOTE — PROGRESS NOTES
Philip Babin is a 23 y.o male that is present in the office for a one month routine follow up for medication evaluation. 1. Have you been to the ER, urgent care clinic since your last visit? Hospitalized since your last visit? No    2. Have you seen or consulted any other health care providers outside of the [address] [place] [address] since your last visit? Include any pap smears or colon screening.  No

## 2017-05-26 NOTE — PROGRESS NOTES
Jordan Cooper is a 23 y. o.  female and presents with    Chief Complaint   Patient presents with    Weight Management           Subjective:  Ms. Dony Valiente presents for f/u weight management. she denies side effects from medications. she reports decreased appetite. she is participating in aqua taisha and aqua aerobics. Sh is taking her blood pressure medication as prescribed; she is exercising and following low salt diet. Patient Active Problem List   Diagnosis Code    Essential hypertension I10    Prediabetes R73.03    Morbid obesity with BMI of 70 and over, adult ([facility] [address] E66.01, Z68.45    Advance care planning Z71.89     Patient Active Problem List    Diagnosis Date Noted    Prediabetes 02/21/2017    Morbid obesity with BMI of 70 and over, adult ([address] 02/21/2017    Advance care planning 02/21/2017    Essential hypertension 02/07/2017     Current Outpatient Prescriptions   Medication Sig Dispense Refill    buPROPion (WELLBUTRIN) 100 mg tablet Take 1 Tab by mouth two (2) times a day. 60 Tab 0    hydroCHLOROthiazide (HYDRODIURIL) 25 mg tablet Take 1 Tab by mouth daily. 30 Tab 3    verapamil ER (VERELAN PM) 100 mg capsule Take 1 Cap by mouth nightly. 30 Cap 3    naltrexone (DEPADE) 50 mg tablet Take 0.5 Tabs by mouth two (2) times a day. 30 Tab 0    metFORMIN ER (GLUCOPHAGE XR) 500 mg tablet Take 1 Tab by mouth daily (with dinner). 30 Tab 1     Allergies   Allergen Reactions    Phentermine Angioedema     Past Medical History:   Diagnosis Date    HTN (hypertension)     Morbid obesity with BMI of 70 and over, adult [facility])      History reviewed. No pertinent surgical history.   Family History   Problem Relation Age of Onset    No Known Problems Mother     No Known Problems Father      Social History   Substance Use Topics    Smoking status: Never Smoker    Smokeless tobacco: Never Used    Alcohol use No       ROS   General ROS: negative for - chills or fever  Psychological ROS: negative for - anxiety or depression  Ophthalmic ROS: positive for - uses glasses  ENT ROS: negative for - headaches  Endocrine ROS: negative for - polydipsia/polyuria or temperature intolerance  Respiratory ROS: no cough, shortness of breath, or wheezing  Cardiovascular ROS: no chest pain or dyspnea on exertion  Gastrointestinal ROS: no abdominal pain, change in bowel habits, or black or bloody stools  Genito-Urinary ROS: no dysuria, trouble voiding, or hematuria  Neurological ROS: negative for - numbness/tingling or weakness  Dermatological ROS: negative for - rash or skin lesion changes    All other systems reviewed and are negative. Objective:  Vitals:    05/26/17 1241 05/26/17 1306   BP: (!) 147/103 138/88   Pulse: 75    Resp: 16    Temp: 97.1 °F (36.2 °C)    TempSrc: Oral    SpO2: 96%    Weight: (!) 443 lb 12.8 oz (201.3 kg)    Height: 5' 2\" (1.575 m)    PainSc:   0 - No pain        alert, well appearing, and in no distress, oriented to person, place, and time and morbid obesity  Mental status - normal mood, behavior, speech, dress, motor activity, and thought processes  Chest - clear to auscultation, no wheezes, rales or rhonchi, symmetric air entry  Heart - normal rate, regular rhythm, normal S1, S2, no murmurs, rubs, clicks or gallops  Neurological - cranial nerves II through XII intact, normal gait and station    Assessment/Plan:    1. Morbid obesity with BMI of 70 and over, adult [facility])  Continue medications as prescribed along with exercise and healthy diet  - buPROPion (WELLBUTRIN) 100 mg tablet; Take 1 Tab by mouth two (2) times a day. Dispense: 60 Tab; Refill: 0  - naltrexone (DEPADE) 50 mg tablet; Take 0.5 Tabs by mouth two (2) times a day. Dispense: 30 Tab; Refill: 0    2.  Hypertension - goal <140/90; controlled with current medication    Lab review: no lab studies available for review at time of visit      I have discussed the diagnosis with the patient and the intended plan as seen in the above orders. The patient has received an after-visit summary and questions were answered concerning future plans. I have discussed medication side effects and warnings with the patient as well. I have reviewed the plan of care with the patient, accepted their input and they are in agreement with the treatment goals. Follow-up Disposition:  Return in about 3 months (around 8/26/2017) for medication evaluation.

## 2017-08-25 ENCOUNTER — OFFICE VISIT (OUTPATIENT)
Dept: FAMILY MEDICINE CLINIC | Age: 27
End: 2017-08-25

## 2017-08-25 VITALS
DIASTOLIC BLOOD PRESSURE: 92 MMHG | HEART RATE: 85 BPM | HEIGHT: 62 IN | SYSTOLIC BLOOD PRESSURE: 138 MMHG | OXYGEN SATURATION: 98 % | RESPIRATION RATE: 20 BRPM | WEIGHT: 293 LBS | TEMPERATURE: 98 F | BODY MASS INDEX: 53.92 KG/M2

## 2017-08-25 DIAGNOSIS — J06.9 VIRAL UPPER RESPIRATORY TRACT INFECTION: Primary | ICD-10-CM

## 2017-08-25 DIAGNOSIS — E66.01 MORBID OBESITY WITH BMI OF 70 AND OVER, ADULT (HCC): ICD-10-CM

## 2017-08-25 RX ORDER — NALTREXONE HYDROCHLORIDE 50 MG/1
25 TABLET, FILM COATED ORAL 2 TIMES DAILY
Qty: 30 TAB | Refills: 2 | Status: SHIPPED | OUTPATIENT
Start: 2017-08-25 | End: 2017-10-19 | Stop reason: ALTCHOICE

## 2017-08-25 RX ORDER — BUPROPION HYDROCHLORIDE 100 MG/1
100 TABLET ORAL 2 TIMES DAILY
Qty: 60 TAB | Refills: 2 | Status: SHIPPED | OUTPATIENT
Start: 2017-08-25 | End: 2017-10-19 | Stop reason: ALTCHOICE

## 2017-08-25 NOTE — PROGRESS NOTES
Cassius Das is a 33 y.o. female to clinic for medication refill and evaluation. Pt c/o headaches, dizziness, and nausea x 6 days. Pt states that she has increased work outs and denies eating or snacking prior to workouts. 1. Have you been to the ER, urgent care clinic since your last visit? Hospitalized since your last visit? No    2. Have you seen or consulted any other health care providers outside of the [address] [place] [address] since your last visit? Include any pap smears or colon screening.  No    Learning Assessment 4/27/2017   PRIMARY LEARNER Patient   CO-LEARNER CAREGIVER No   PRIMARY LANGUAGE ENGLISH   LEARNER PREFERENCE PRIMARY DEMONSTRATION   ANSWERED BY Patient   RELATIONSHIP SELF

## 2017-08-25 NOTE — MR AVS SNAPSHOT
Visit Information Date & Time Provider Department Dept. Phone Encounter #  
 8/25/2017 12:45 PM Diana Meek, [address] [facility] [phone] [id number] Follow-up Instructions Return in about 1 month (around 9/25/2017) for medication evaluation. Upcoming Health Maintenance Date Due  
 HPV AGE 9Y-34Y (1 of 3 - Female 3 Dose Series) 6/5/2002 DTaP/Tdap/Td series (1 - Tdap) 6/5/2012 PAP AKA CERVICAL CYTOLOGY 6/5/2012 INFLUENZA AGE 9 TO ADULT 8/1/2017 Allergies as of 8/25/2017  Review Complete On: 8/25/2017 By: Diana Meek MD  
  
 Severity Noted Reaction Type Reactions Phentermine  02/07/2017    Angioedema Current Immunizations  Never Reviewed No immunizations on file. Not reviewed this visit You Were Diagnosed With   
  
 Codes Comments Viral upper respiratory tract infection    -  Primary ICD-10-CM: J06.9, B97.89 ICD-9-CM: 465.9 Morbid obesity with BMI of 70 and over, adult [facility])     ICD-10-CM: E66.01, Z68.45 ICD-9-CM: 278.01, V85.45 Vitals BP Pulse Temp Resp Height(growth percentile) Weight(growth percentile) (!) 138/92 (BP 1 Location: Left arm, BP Patient Position: Sitting) 85 98 °F (36.7 °C) (Oral) 20 5' 2\" (1.575 m) (!) 449 lb 12.8 oz (204 kg) LMP SpO2 BMI OB Status Smoking Status 08/17/2017 98% 82.27 kg/m2 Having regular periods Never Smoker Vitals History BMI and BSA Data Body Mass Index Body Surface Area  
 82.27 kg/m 2 2.99 m 2 Preferred Pharmacy Pharmacy Name Phone [place] [facility] [address], [address] [facility] [phone] Your Updated Medication List  
  
   
This list is accurate as of: 8/25/17  1:37 PM.  Always use your most recent med list.  
  
  
  
  
 buPROPion 100 mg tablet Commonly known as:  [facility] Take 1 Tab by mouth two (2) times a day. hydroCHLOROthiazide 25 mg tablet Commonly known as:  HYDRODIURIL  
 Take 1 Tab by mouth daily. metFORMIN  mg tablet Commonly known as:  GLUCOPHAGE XR Take 1 Tab by mouth daily (with dinner). naltrexone 50 mg tablet Commonly known as:  DEPADE Take 0.5 Tabs by mouth two (2) times a day. verapamil  mg capsule Commonly known as:  VERELAN PM  
Take 1 Cap by mouth nightly. Prescriptions Sent to Pharmacy Refills  
 naltrexone (DEPADE) 50 mg tablet 2 Sig: Take 0.5 Tabs by mouth two (2) times a day. Class: Normal  
 Pharmacy: [facility] [address], [address] Ph #: [phone] Route: Oral  
 buPROPion (WELLBUTRIN) 100 mg tablet 2 Sig: Take 1 Tab by mouth two (2) times a day. Class: Normal  
 Pharmacy: [facility] [address], [address] Ph #: [phone] Route: Oral  
  
Follow-up Instructions Return in about 1 month (around 9/25/2017) for medication evaluation. Patient Instructions Upper Respiratory Infection (Cold): Care Instructions Your Care Instructions An upper respiratory infection, or URI, is an infection of the nose, sinuses, or throat. URIs are spread by coughs, sneezes, and direct contact. The common cold is the most frequent kind of URI. The flu and sinus infections are other kinds of URIs. Almost all URIs are caused by viruses. Antibiotics won't cure them. But you can treat most infections with home care. This may include drinking lots of fluids and taking over-the-counter pain medicine. You will probably feel better in 4 to 10 days. The doctor has checked you carefully, but problems can develop later. If you notice any problems or new symptoms, get medical treatment right away. Follow-up care is a key part of your treatment and safety. Be sure to make and go to all appointments, and call your doctor if you are having problems. It's also a good idea to know your test results and keep a list of the medicines you take. How can you care for yourself at home? · To prevent dehydration, drink plenty of fluids, enough so that your urine is light yellow or clear like water. Choose water and other caffeine-free clear liquids until you feel better. If you have kidney, heart, or liver disease and have to limit fluids, talk with your doctor before you increase the amount of fluids you drink. · Take an over-the-counter pain medicine, such as acetaminophen (Tylenol), ibuprofen (Advil, Motrin), or naproxen (Aleve). Read and follow all instructions on the label. · Before you use cough and cold medicines, check the label. These medicines may not be safe for young children or for people with certain health problems. · Be careful when taking over-the-counter cold or flu medicines and Tylenol at the same time. Many of these medicines have acetaminophen, which is Tylenol. Read the labels to make sure that you are not taking more than the recommended dose. Too much acetaminophen (Tylenol) can be harmful. · Get plenty of rest. 
· Do not smoke or allow others to smoke around you. If you need help quitting, talk to your doctor about stop-smoking programs and medicines. These can increase your chances of quitting for good. When should you call for help? Call 911 anytime you think you may need emergency care. For example, call if: 
· You have severe trouble breathing. Call your doctor now or seek immediate medical care if: 
· You seem to be getting much sicker. · You have new or worse trouble breathing. · You have a new or higher fever. · You have a new rash. Watch closely for changes in your health, and be sure to contact your doctor if: 
· You have a new symptom, such as a sore throat, an earache, or sinus pain. · You cough more deeply or more often, especially if you notice more mucus or a change in the color of your mucus. · You do not get better as expected. Where can you learn more? Go to http://chaya-eris.info/. Enter F496 in the search box to learn more about \"Upper Respiratory Infection (Cold): Care Instructions. \" Current as of: March 25, 2017 Content Version: 11.3 © 6293-7925 [employer] Care instructions adapted under license by [employer] (which disclaims liability or warranty for this information). If you have questions about a medical condition or this instruction, always ask your healthcare professional. [address] any warranty or liability for your use of this information. Introducing [facility] & HEALTH SERVICES! [facility] introduces [employer] patient portal. Now you can access parts of your medical record, email your doctor's office, and request medication refills online. 1. In your internet browser, go to [email or web address] 2. Click on the First Time User? Click Here link in the Sign In box. You will see the New Member Sign Up page. 3. Enter your [employer] Access Code exactly as it appears below. You will not need to use this code after youve completed the sign-up process. If you do not sign up before the expiration date, you must request a new code. · [employer] Access Code: [email or web address] Expires: 11/23/2017 12:48 PM 
 
4. Enter the last four digits of your Social Security Number (xxxx) and Date of Birth (mm/dd/yyyy) as indicated and click Submit. You will be taken to the next sign-up page. 5. Create a Toophert ID. This will be your [employer] login ID and cannot be changed, so think of one that is secure and easy to remember. 6. Create a [employer] password. You can change your password at any time. 7. Enter your Password Reset Question and Answer. This can be used at a later time if you forget your password. 8. Enter your e-mail address. You will receive e-mail notification when new information is available in [address] 9. Click Sign Up. You can now view and download portions of your medical record. 10. Click the Download Summary menu link to download a portable copy of your medical information. If you have questions, please visit the Frequently Asked Questions section of the [employer]Stage website. Remember, [employer] is NOT to be used for urgent needs. For medical emergencies, dial 911. Now available from your iPhone and Android! Please provide this summary of care documentation to your next provider. Your primary care clinician is listed as Christianne Foreman. If you have any questions after today's visit, please call [phone].

## 2017-08-25 NOTE — PROGRESS NOTES
Pelon Yousif is a 33 y.o.  female and presents with    Chief Complaint   Patient presents with    Medication Evaluation     Subjective:  Upper Respiratory Infection  Patient complains of symptoms of a URI. Symptoms include congestion, sore throat, cough and fatigue with malaise. Onset of symptoms was 1 week ago, gradually improving since that time. She also c/o achiness, coryza and headache described as pressure for the past 1 week . She is drinking plenty of fluids. . Evaluation to date: none. Treatment to date: none. Ms. Ml Soliz presents for f/u weight management. she denies side effects from medications. she reports decreased appetite. she is participating in aqua taisha and aqua aerobics.         She is taking her blood pressure medication as prescribed; she is exercising and following low salt diet. ROS   General ROS: negative for - chills or fever  Psychological ROS: negative for - anxiety or depression  Ophthalmic ROS: positive for - uses glasses  ENT ROS: negative for - headaches  Endocrine ROS: negative for - polydipsia/polyuria or temperature intolerance  Cardiovascular ROS: no chest pain or dyspnea on exertion  Gastrointestinal ROS: no abdominal pain, change in bowel habits, or black or bloody stools  Genito-Urinary ROS: no dysuria, trouble voiding, or hematuria  Neurological ROS: negative for - numbness/tingling or weakness  Dermatological ROS: negative for - rash or skin lesion changes    All other systems reviewed and are negative.       Objective:  Vitals:    08/25/17 1313   BP: (!) 138/92   Pulse: 85   Resp: 20   Temp: 98 °F (36.7 °C)   TempSrc: Oral   SpO2: 98%   Weight: (!) 449 lb 12.8 oz (204 kg)   Height: 5' 2\" (1.575 m)   PainSc:   0 - No pain   LMP: 08/17/2017     alert, well appearing, and in no distress, oriented to person, place, and time and morbid obesity  Mental status - normal mood, behavior, speech, dress, motor activity, and thought processes  Chest - clear to auscultation, no wheezes, rales or rhonchi, symmetric air entry  Heart - normal rate, regular rhythm, normal S1, S2, no murmurs, rubs, clicks or gallops  Neurological - cranial nerves II through XII intact, normal gait and station     Assessment/Plan:    1. Morbid obesity with BMI of 70 and over, adult ([facility] [address]  I have reviewed/discussed the above normal BMI with the patient. I have recommended the following interventions: dietary management education, guidance, and counseling and encourage exercise . continue weight management with prescription therapy      - naltrexone (DEPADE) 50 mg tablet; Take 0.5 Tabs by mouth two (2) times a day. Dispense: 30 Tab; Refill: 2  - buPROPion (WELLBUTRIN) 100 mg tablet; Take 1 Tab by mouth two (2) times a day. Dispense: 60 Tab; Refill: 2    2. Viral upper respiratory tract infection  Conservative treatment; increase fluid intake      Lab review: no lab studies available for review at time of visit      I have discussed the diagnosis with the patient and the intended plan as seen in the above orders. The patient has received an after-visit summary and questions were answered concerning future plans. I have discussed medication side effects and warnings with the patient as well. I have reviewed the plan of care with the patient, accepted their input and they are in agreement with the treatment goals. Follow-up Disposition:  Return in about 1 month (around 9/25/2017) for medication evaluation.

## 2017-08-25 NOTE — PATIENT INSTRUCTIONS

## 2017-09-22 ENCOUNTER — OFFICE VISIT (OUTPATIENT)
Dept: FAMILY MEDICINE CLINIC | Age: 27
End: 2017-09-22

## 2017-09-22 VITALS
HEART RATE: 75 BPM | SYSTOLIC BLOOD PRESSURE: 139 MMHG | BODY MASS INDEX: 53.92 KG/M2 | DIASTOLIC BLOOD PRESSURE: 86 MMHG | OXYGEN SATURATION: 97 % | TEMPERATURE: 96.6 F | WEIGHT: 293 LBS | RESPIRATION RATE: 16 BRPM | HEIGHT: 62 IN

## 2017-09-22 DIAGNOSIS — R73.03 PREDIABETES: ICD-10-CM

## 2017-09-22 DIAGNOSIS — E66.01 MORBID OBESITY WITH BMI OF 70 AND OVER, ADULT (HCC): Primary | ICD-10-CM

## 2017-09-22 DIAGNOSIS — I10 ESSENTIAL HYPERTENSION: ICD-10-CM

## 2017-09-22 RX ORDER — TOPIRAMATE 25 MG/1
25 TABLET ORAL 2 TIMES DAILY WITH MEALS
Qty: 60 TAB | Refills: 1 | Status: SHIPPED | OUTPATIENT
Start: 2017-09-22 | End: 2017-11-28 | Stop reason: SDUPTHER

## 2017-09-22 NOTE — PROGRESS NOTES
1. Have you been to the ER, urgent care clinic since your last visit? Hospitalized since your last visit? No    2. Have you seen or consulted any other health care providers outside of the [address] [place] [address] since your last visit? Include any pap smears or colon screening.  No

## 2017-09-22 NOTE — MR AVS SNAPSHOT
Visit Information Date & Time Provider Department Dept. Phone Encounter #  
 9/22/2017  1:00 PM Joon Myers, [facility] [phone] [id number] Follow-up Instructions Return in about 1 month (around 10/22/2017) for weight management. Your Appointments 9/22/2017  1:00 PM  
Office Visit with Joon Myers MD  
[address] [address]) Appt Note: Return in about 1 month (around 9/25/2017) for medication evaluation. [address]  
  
   
 [address] [place] [address] [address] Upcoming Health Maintenance Date Due  
 HPV AGE 9Y-34Y (1 of 3 - Female 3 Dose Series) 6/5/2002 DTaP/Tdap/Td series (1 - Tdap) 6/5/2012 PAP AKA CERVICAL CYTOLOGY 6/5/2012 INFLUENZA AGE 9 TO ADULT 8/1/2017 Allergies as of 9/22/2017  Review Complete On: 9/22/2017 By: Joon Myers MD  
  
 Severity Noted Reaction Type Reactions Phentermine  02/07/2017    Angioedema Current Immunizations  Never Reviewed No immunizations on file. Not reviewed this visit You Were Diagnosed With   
  
 Codes Comments Morbid obesity with BMI of 70 and over, adult [facility])    -  Primary ICD-10-CM: E66.01, B89.18 ICD-9-CM: 278.01, V85.45 Essential hypertension     ICD-10-CM: I10 
ICD-9-CM: 401.9 Prediabetes     ICD-10-CM: R73.03 
ICD-9-CM: 790.29 Vitals BP Pulse Temp Resp Height(growth percentile) Weight(growth percentile) 139/86 (BP 1 Location: Right arm, BP Patient Position: Sitting) 75 96.6 °F (35.9 °C) (Oral) 16 5' 2\" (1.575 m) (!) 448 lb 3.2 oz (203.3 kg) LMP SpO2 BMI OB Status Smoking Status 08/17/2017 97% 81.98 kg/m2 Having regular periods Never Smoker BMI and BSA Data Body Mass Index Body Surface Area 81.98 kg/m 2 2.98 m 2 Preferred Pharmacy Pharmacy Name Phone [facility] [address], [address] [facility] [phone] Your Updated Medication List  
  
   
This list is accurate as of: 9/22/17 12:53 PM.  Always use your most recent med list.  
  
  
  
  
 buPROPion 100 mg tablet Commonly known as:  [facility] Take 1 Tab by mouth two (2) times a day. hydroCHLOROthiazide 25 mg tablet Commonly known as:  HYDRODIURIL Take 1 Tab by mouth daily. naltrexone 50 mg tablet Commonly known as:  DEPADE Take 0.5 Tabs by mouth two (2) times a day. topiramate 25 mg tablet Commonly known as:  TOPAMAX Take 1 Tab by mouth two (2) times daily (with meals). verapamil  mg capsule Commonly known as:  VERELAN PM  
Take 1 Cap by mouth nightly. Prescriptions Sent to Pharmacy Refills  
 topiramate (TOPAMAX) 25 mg tablet 1 Sig: Take 1 Tab by mouth two (2) times daily (with meals). Class: Normal  
 Pharmacy: [address], [address] Ph #: [phone] Route: Oral  
  
We Performed the Following REFERRAL TO SURGERY [IML580 Custom] Comments:  
 Please evaluate 34 y/o female patient for BMI 81. Co-morbid condition: hypertension Follow-up Instructions Return in about 1 month (around 10/22/2017) for weight management. Referral Information Referral ID Referred By Referred To  
  
 [id number] GIULIANO NELSON MD   
   [id number] [facility] [address] [place] [address] Phone: [phone] Fax: [phone] Visits Status Start Date End Date 1 New Request 9/22/17 9/22/18 If your referral has a status of pending review or denied, additional information will be sent to support the outcome of this decision. Introducing [facility] & HEALTH SERVICES!    
 Ellie Doan introduces [facility] patient portal. Now you can access parts of your medical record, email your doctor's office, and request medication refills online. 1. In your internet browser, go to [email or web address] 2. Click on the First Time User? Click Here link in the Sign In box. You will see the New Member Sign Up page. 3. Enter your [employer] Access Code exactly as it appears below. You will not need to use this code after youve completed the sign-up process. If you do not sign up before the expiration date, you must request a new code. · [employer] Access Code: [email or web address] Expires: 11/23/2017 12:48 PM 
 
4. Enter the last four digits of your Social Security Number (xxxx) and Date of Birth (mm/dd/yyyy) as indicated and click Submit. You will be taken to the next sign-up page. 5. Create a [employer] ID. This will be your [employer] login ID and cannot be changed, so think of one that is secure and easy to remember. 6. Create a [employer] password. You can change your password at any time. 7. Enter your Password Reset Question and Answer. This can be used at a later time if you forget your password. 8. Enter your e-mail address. You will receive e-mail notification when new information is available in [address] 9. Click Sign Up. You can now view and download portions of your medical record. 10. Click the Download Summary menu link to download a portable copy of your medical information. If you have questions, please visit the Frequently Asked Questions section of the [employer] website. Remember, [employer] is NOT to be used for urgent needs. For medical emergencies, dial 911. Now available from your iPhone and Android! Please provide this summary of care documentation to your next provider. Your primary care clinician is listed as Vanessa Penaloza. If you have any questions after today's visit, please call [phone].

## 2017-09-22 NOTE — PROGRESS NOTES
Vladimir Ray is a 33 y.o.  female and presents with    Chief Complaint   Patient presents with    Weight Management     Subjective:  Ms. Rayray Lemos presents for f/u weight management. Susan East denies side effects from medications.  she reports decreased appetite.  she is participating in aqua taisha and aqua aerobics.          She is taking her blood pressure medication as prescribed; she is exercising and following low salt diet. Patient Active Problem List   Diagnosis Code    Essential hypertension I10    Prediabetes R73.03    Morbid obesity with BMI of 70 and over, adult ([facility] E66.01, Z68.45    Advance care planning Z71.89     Patient Active Problem List    Diagnosis Date Noted    Prediabetes 02/21/2017    Morbid obesity with BMI of 70 and over, adult ([facility] 02/21/2017    Advance care planning 02/21/2017    Essential hypertension 02/07/2017     Current Outpatient Prescriptions   Medication Sig Dispense Refill    naltrexone (DEPADE) 50 mg tablet Take 0.5 Tabs by mouth two (2) times a day. 30 Tab 2    buPROPion (WELLBUTRIN) 100 mg tablet Take 1 Tab by mouth two (2) times a day. 60 Tab 2    hydroCHLOROthiazide (HYDRODIURIL) 25 mg tablet Take 1 Tab by mouth daily. 30 Tab 3    verapamil ER (VERELAN PM) 100 mg capsule Take 1 Cap by mouth nightly. 30 Cap 3    metFORMIN ER (GLUCOPHAGE XR) 500 mg tablet Take 1 Tab by mouth daily (with dinner). 30 Tab 1     Allergies   Allergen Reactions    Phentermine Angioedema     Past Medical History:   Diagnosis Date    HTN (hypertension)     Morbid obesity with BMI of 70 and over, adult [facility])      History reviewed. No pertinent surgical history.   Family History   Problem Relation Age of Onset    No Known Problems Mother     No Known Problems Father      Social History   Substance Use Topics    Smoking status: Never Smoker    Smokeless tobacco: Never Used    Alcohol use No       ROS   General ROS: negative for - chills or fever  Psychological ROS: negative for - anxiety or depression  Ophthalmic ROS: positive for - uses glasses  ENT ROS: negative for - headaches  Endocrine ROS: negative for - polydipsia/polyuria or temperature intolerance  Cardiovascular ROS: no chest pain or dyspnea on exertion  Gastrointestinal ROS: no abdominal pain, change in bowel habits, or black or bloody stools  Genito-Urinary ROS: no dysuria, trouble voiding, or hematuria  Neurological ROS: negative for - numbness/tingling or weakness  Dermatological ROS: negative for - rash or skin lesion changes    All other systems reviewed and are negative. Objective:  Vitals:    09/22/17 1236   BP: 139/86   Pulse: 75   Resp: 16   Temp: 96.6 °F (35.9 °C)   TempSrc: Oral   SpO2: 97%   Weight: (!) 448 lb 3.2 oz (203.3 kg)   Height: 5' 2\" (1.575 m)   PainSc:   0 - No pain   LMP: 08/17/2017     alert, well appearing, and in no distress, oriented to person, place, and time and morbid obesity  Mental status - normal mood, behavior, speech, dress, motor activity, and thought processes  Chest - clear to auscultation, no wheezes, rales or rhonchi, symmetric air entry  Heart - normal rate, regular rhythm, normal S1, S2, no murmurs, rubs, clicks or gallops  Neurological - cranial nerves II through XII intact, normal gait and station     Assessment/Plan:    1. Morbid obesity with BMI of 70 and over, adult ([facility]  I have reviewed/discussed the above normal BMI with the patient. I have recommended the following interventions: dietary management education, guidance, and counseling and encourage exercise . referred to weight loss surgery for counseling and evaluation; continue topiramate     - topiramate (TOPAMAX) 25 mg tablet; Take 1 Tab by mouth two (2) times daily (with meals). Dispense: 60 Tab; Refill: 1  - REFERRAL TO SURGERY    2. Essential hypertension  Goal <140/90; improved with therapy  3.  Prediabetes  Encourage healthy diet and exercise; continue glucophage    Lab review: no lab studies available for review at time of visit      I have discussed the diagnosis with the patient and the intended plan as seen in the above orders. The patient has received an after-visit summary and questions were answered concerning future plans. I have discussed medication side effects and warnings with the patient as well. I have reviewed the plan of care with the patient, accepted their input and they are in agreement with the treatment goals. Follow-up Disposition:  Return in about 1 month (around 10/22/2017) for weight management.

## 2017-10-17 ENCOUNTER — TELEPHONE (OUTPATIENT)
Dept: SURGERY | Age: 27
End: 2017-10-17

## 2017-10-17 NOTE — TELEPHONE ENCOUNTER
Pt called regarding co payment. I told her that if there is a copayment stated on her card it will be due at the visit. Pt states she has watched the video and is excited about the appointment.

## 2017-10-19 ENCOUNTER — OFFICE VISIT (OUTPATIENT)
Dept: SURGERY | Age: 27
End: 2017-10-19

## 2017-10-19 VITALS
HEART RATE: 88 BPM | HEIGHT: 62 IN | RESPIRATION RATE: 20 BRPM | BODY MASS INDEX: 53.92 KG/M2 | WEIGHT: 293 LBS | SYSTOLIC BLOOD PRESSURE: 137 MMHG | TEMPERATURE: 98.3 F | DIASTOLIC BLOOD PRESSURE: 86 MMHG

## 2017-10-19 DIAGNOSIS — E66.01 MORBID OBESITY DUE TO EXCESS CALORIES (HCC): Primary | ICD-10-CM

## 2017-10-19 NOTE — PROGRESS NOTES
Neda Nguyen is a 33 y.o. female Who presents today seeking education on weight loss options. Body mass index is 82.12 kg/(m^2). 1. Have you been to the ER, urgent care clinic since your last visit? Hospitalized since your last visit? No    2. Have you seen or consulted any other health care providers outside of the [address] [place] [address] since your last visit? Include any pap smears or colon screening.  No

## 2017-10-19 NOTE — PROGRESS NOTES
Initial Consultation for Bariatric Surgery     Jasmine Guzman is a 33 y.o. female who comes into the office today for initial consultation for the surgical options for the treatment of morbid obesity. The patient initially identified obesity since childhood. Jasmine Guzman has tried a variety of unsupervised weight-loss attempts including unsupervised diets and prescription diet pills, but has yet to meet with lasting success. Maximum weight lost on a diet is about 10 lbs, but that the weight always seems to return. Today, the patient is Height: 5' 2\" (157.5 cm) , Weight: (!) 203.7 kg (449 lb) for a BMI of Body mass index is 82.12 kg/(m^2). Jinny Rios Maximum weight is today's weight. It is due to their severe obesity, which is further complicated by  hypertension and low back pain. that the patient is now seeking out bariatric surgery. She notes that she likes to eat a lot of fast food, snack foods. She drinks soda, sweet teas and fruit juices. Weight Loss Metrics 10/19/2017 10/19/2017 9/22/2017 8/25/2017 5/26/2017 4/27/2017 3/29/2017   Pre op / Initial Wt 449 - - - - - -   Today's Wt - 449 lb 448 lb 3.2 oz 449 lb 12.8 oz 443 lb 12.8 oz 452 lb 449 lb 6.4 oz   BMI - 82.12 kg/m2 81.98 kg/m2 82.27 kg/m2 81.17 kg/m2 82.67 kg/m2 82.2 kg/m2   Ideal Body Wt 128 - - - - - -   Excess Body Wt 321 - - - - - -   Goal Wt 193 - - - - - -   Wt loss to date 0 - - - - - -   % Wt Loss 0 - - - - - -   80% .8 - - - - - -       Body mass index is 82.12 kg/(m^2). Past Medical History:   Diagnosis Date    HTN (hypertension)     Morbid obesity with BMI of 70 and over, adult [facility])        History reviewed. No pertinent surgical history. Current Outpatient Prescriptions   Medication Sig Dispense Refill    topiramate (TOPAMAX) 25 mg tablet Take 1 Tab by mouth two (2) times daily (with meals). 60 Tab 1    naltrexone (DEPADE) 50 mg tablet Take 0.5 Tabs by mouth two (2) times a day.  30 Tab 2    buPROPion [facility]) 100 mg tablet Take 1 Tab by mouth two (2) times a day. 60 Tab 2    hydroCHLOROthiazide (HYDRODIURIL) 25 mg tablet Take 1 Tab by mouth daily. 30 Tab 3    verapamil ER (VERELAN PM) 100 mg capsule Take 1 Cap by mouth nightly. 30 Cap 3       Allergies   Allergen Reactions    Phentermine Angioedema       Social History   Substance Use Topics    Smoking status: Never Smoker    Smokeless tobacco: Never Used    Alcohol use None       Family History   Problem Relation Age of Onset    Hypertension Mother     No Known Problems Father        ROS, positive where in bold:    General: fevers, chills, night sweats, fatigue, weight loss, weight gain    GI: abdominal pain, nausea, vomiting, change in bowel habits, hematochezia, melena, GERD, constipation    Integumentary: dermatitis or abnormal moles.     HEENT:  visual changes, vertigo, epistaxis, dysphagia, hoarseness    Cardiac: chest pain, palpitations, hypertension, edema, varicosities    Resp:  cough, shortness of breath, wheezing, hemoptysis, snoring, reactive airway disease    : hematuria, dysuria, frequency, urgency, nocturia, stress urinary incontinence    MSK: weakness, joint pain, arthritis    Endocrine: diabetes, thyroid disease, polyuria, polydipsia, polyphagia, poor wound healing, heat intolerance,cold intolerance    Lymph/Heme: anemia, bruising, history of blood transfusions    Neuro: dizziness, headache, fainting, seizures, stroke    Psychiatry:  anxiety, depression, psychosis      Physical Exam:  Visit Vitals    /86 (BP 1 Location: Right arm, BP Patient Position: At rest)    Pulse 88    Temp 98.3 °F (36.8 °C) (Oral)    Resp 20    Ht 5' 2\" (1.575 m)    Wt (!) 203.7 kg (449 lb)    BMI 82.12 kg/m2       General: Well developed, well nourished 33 y.o. female in no acute distress  ENMT: normocephalic, atraumatic mouth:clear, no overt lesions, oral mucosa pink and moist  Neck: supple, no masses, no adenopathy or carotid bruits, trachea midline  Skin: warm, smooth, dry and well perfused  Respiratory: clear to auscultation bilaterally, no wheeze, rhonchi or rales, excursions normal and symmetrical  Cardiovascular: Regular rate and rhythm, no murmurs, clicks, gallops or rubs, no edema or varicosities  Gastrointestinal: soft, nontender, nondistended, normoactive bowel sounds, no hernias, no hepatosplenomegaly, nonpalpable costal margins, mixed distribution  Musculoskeletal: warm, well-perfused, no tenderness or swelling, normal gait/station  Neuro: sensation and strength grossly intact and symmetrical  Psych: alert and oriented to person, place and time, poor insight      Impression:    Rohan Littlejohn is a 33 y.o. female who is suffering from morbid obesity and its attendant comorbidities who would benefit from bariatric surgery. We have had an extensive discussion with regard to the risks, benefits and likely outcomes of both the sleeve and the gastric bypass. With regard to bypass, we have discussed the risks including bleeding, infection, need for reoperation, damage to surrounding structure, anastomotic leak, gastrogastric fistula ulcer and stricture, bowel obstruction due to internal hernia or adhesions, DVT, PE, heart attack, stroke and death. Patient also understands risks of inadequate weight loss, excess weight loss, vitamin insufficiency, protein malnutrition, excess skin, and loss of hair. We've discussed the restrictive nature of the sleeve gastrectomy. The patient understands the likelihood of losing approximately 65% of their excess weight in 12 to 18 months. Patient also understands the risks including but not limited to bleeding, infection, need for reoperation, leaks from staple line and strictures, bowel obstruction secondary to adhesions, DVT, PE, heart attack, stroke, and death.  Patient also understands risks of inadequate weight loss, excess weight loss, vitamin insufficiency, protein malnutrition, excess skin, and loss of hair. We have reviewed the components of a successful postoperative course including requirement for a high protein, low carbohydrate diet, 60 oz a day of zero calorie liquids, daily vitamin supplementation, daily exercise, regular follow-up, and participation in support groups. At this time we will enroll the patient in our bariatric program, undertake routine laboratory and radiographic evaluation, EKG, evaluation by nutritionist as well as psychologist.  Patient has a very high BMI and nonpalpable costal margins which may preclude ability to perform surgery. I have advised her that she will need to work with Yoly Navarro and lose approximately 50lbs prior to undertaking any surgery.

## 2017-10-21 LAB
UREA BREATH TEST QL: NEGATIVE
UREA BREATH TEST QL: NORMAL

## 2017-10-27 ENCOUNTER — OFFICE VISIT (OUTPATIENT)
Dept: FAMILY MEDICINE CLINIC | Age: 27
End: 2017-10-27

## 2017-10-27 VITALS
RESPIRATION RATE: 16 BRPM | BODY MASS INDEX: 53.92 KG/M2 | DIASTOLIC BLOOD PRESSURE: 77 MMHG | HEIGHT: 62 IN | WEIGHT: 293 LBS | OXYGEN SATURATION: 94 % | TEMPERATURE: 96.7 F | HEART RATE: 68 BPM | SYSTOLIC BLOOD PRESSURE: 151 MMHG

## 2017-10-27 DIAGNOSIS — R73.03 PREDIABETES: ICD-10-CM

## 2017-10-27 DIAGNOSIS — E66.01 MORBID OBESITY WITH BMI OF 70 AND OVER, ADULT (HCC): Primary | ICD-10-CM

## 2017-10-27 DIAGNOSIS — I10 ESSENTIAL HYPERTENSION: ICD-10-CM

## 2017-10-27 RX ORDER — HYDROCHLOROTHIAZIDE 25 MG/1
25 TABLET ORAL DAILY
Qty: 30 TAB | Refills: 3 | Status: SHIPPED | OUTPATIENT
Start: 2017-10-27 | End: 2018-10-30

## 2017-10-27 RX ORDER — VERAPAMIL HYDROCHLORIDE 100 MG/1
100 CAPSULE, EXTENDED RELEASE ORAL
Qty: 30 CAP | Refills: 3 | Status: SHIPPED | OUTPATIENT
Start: 2017-10-27 | End: 2018-02-15 | Stop reason: SDUPTHER

## 2017-10-27 NOTE — MR AVS SNAPSHOT
Visit Information Date & Time Provider Department Dept. Phone Encounter #  
 10/27/2017  1:00 PM Keke Rodrigues, [address] [facility] [phone] [id number] Follow-up Instructions Return in about 1 month (around 11/27/2017) for blood pressure. Your Appointments 11/9/2017 10:30 AM  
Follow Up with Gely Sparrow MD  
[address] [facility]) Appt Note: One week follow up; â¢$0 CP/. ..Xiomara Roch; [address] [facility] [address] [facility] [address] [place] [address] Upcoming Health Maintenance Date Due  
 HPV AGE 9Y-34Y (1 of 3 - Female 3 Dose Series) 6/5/2002 DTaP/Tdap/Td series (1 - Tdap) 6/5/2012 PAP AKA CERVICAL CYTOLOGY 6/5/2012 INFLUENZA AGE 9 TO ADULT 8/1/2017 Allergies as of 10/27/2017  Review Complete On: 10/27/2017 By: Keke Rodrigues MD  
  
 Severity Noted Reaction Type Reactions Phentermine  02/07/2017    Angioedema Current Immunizations  Never Reviewed No immunizations on file. Not reviewed this visit You Were Diagnosed With   
  
 Codes Comments Morbid obesity with BMI of 70 and over, adult [facility])    -  Primary ICD-10-CM: E66.01, V84.19 ICD-9-CM: 278.01, V85.45 Essential hypertension     ICD-10-CM: I10 
ICD-9-CM: 401.9 Prediabetes     ICD-10-CM: R73.03 
ICD-9-CM: 790.29 Vitals BP Pulse Temp Resp Height(growth percentile) Weight(growth percentile) 151/77 (BP 1 Location: Right arm, BP Patient Position: Sitting) 68 96.7 °F (35.9 °C) (Oral) 16 5' 2\" (1.575 m) (!) 448 lb 9.6 oz (203.5 kg) SpO2 BMI OB Status Smoking Status 94% 82.05 kg/m2 Having regular periods Never Smoker BMI and BSA Data Body Mass Index Body Surface Area 82.05 kg/m 2 2.98 m 2 Preferred Pharmacy Pharmacy Name Phone [facility] [address], [address] [phone] Your Updated Medication List  
  
   
This list is accurate as of: 10/27/17  1:06 PM.  Always use your most recent med list.  
  
  
  
  
 hydroCHLOROthiazide 25 mg tablet Commonly known as:  HYDRODIURIL Take 1 Tab by mouth daily. topiramate 25 mg tablet Commonly known as:  TOPAMAX Take 1 Tab by mouth two (2) times daily (with meals). verapamil  mg capsule Commonly known as:  VERELAN PM  
Take 1 Cap by mouth nightly. Prescriptions Sent to Pharmacy Refills  
 verapamil ER (VERELAN PM) 100 mg capsule 3 Sig: Take 1 Cap by mouth nightly. Class: Normal  
 Pharmacy: [id number], [address] Ph #: [phone] Route: Oral  
 hydroCHLOROthiazide (HYDRODIURIL) 25 mg tablet 3 Sig: Take 1 Tab by mouth daily. Class: Normal  
 Pharmacy: [id number], [address] Ph #: [phone] Route: Oral  
  
Follow-up Instructions Return in about 1 month (around 11/27/2017) for blood pressure. Introducing [facility]! Samia Balderas introduces [employer] patient portal. Now you can access parts of your medical record, email your doctor's office, and request medication refills online. 1. In your internet browser, go to [email or web address] 2. Click on the First Time User? Click Here link in the Sign In box. You will see the New Member Sign Up page. 3. Enter your [employer] Access Code exactly as it appears below. You will not need to use this code after youve completed the sign-up process. If you do not sign up before the expiration date, you must request a new code. · [employer] Access Code: [email or web address] Expires: 11/23/2017 12:48 PM 
 
4. Enter the last four digits of your Social Security Number (xxxx) and Date of Birth (mm/dd/yyyy) as indicated and click Submit. You will be taken to the next sign-up page. 5. Create a [employer] ID. This will be your [employer] login ID and cannot be changed, so think of one that is secure and easy to remember. 6. Create a [employer] password. You can change your password at any time. 7. Enter your Password Reset Question and Answer. This can be used at a later time if you forget your password. 8. Enter your e-mail address. You will receive e-mail notification when new information is available in [address] 9. Click Sign Up. You can now view and download portions of your medical record. 10. Click the Download Summary menu link to download a portable copy of your medical information. If you have questions, please visit the Frequently Asked Questions section of the [employer] website. Remember, [employer] is NOT to be used for urgent needs. For medical emergencies, dial 911. Now available from your iPhone and Android! Please provide this summary of care documentation to your next provider. Your primary care clinician is listed as Orlan Halsted. If you have any questions after today's visit, please call [phone].

## 2017-10-27 NOTE — PROGRESS NOTES
Hugh Barreto is a 33 y.o.  female and presents with    Chief Complaint   Patient presents with    Weight Management           Subjective:  Hypertension  Patient is here for follow-up of hypertension. She indicates that she is feeling well and denies any symptoms referable to her hypertension. She is exercising and is adherent to low salt diet. Blood pressure is not well controlled at home. Use of agents associated with hypertension: none. Ms. Aleta Renteria presents for f/u weight management. Gema Shi denies side effects from medications.  she reports decreased appetite. Gema Shi is participating in aqua taisha and aqua aerobics.          She is taking her blood pressure medication as prescribed; she is exercising and following low salt diet    Patient Active Problem List   Diagnosis Code    Essential hypertension I10    Prediabetes R73.03    Morbid obesity with BMI of 70 and over, adult ([facility] E66.01, Z68.45    Advance care planning Z71.89     Patient Active Problem List    Diagnosis Date Noted    Prediabetes 02/21/2017    Morbid obesity with BMI of 70 and over, adult ([facility] 02/21/2017    Advance care planning 02/21/2017    Essential hypertension 02/07/2017     Current Outpatient Prescriptions   Medication Sig Dispense Refill    topiramate (TOPAMAX) 25 mg tablet Take 1 Tab by mouth two (2) times daily (with meals). 60 Tab 1    hydroCHLOROthiazide (HYDRODIURIL) 25 mg tablet Take 1 Tab by mouth daily. 30 Tab 3    verapamil ER (VERELAN PM) 100 mg capsule Take 1 Cap by mouth nightly. 30 Cap 3     Allergies   Allergen Reactions    Phentermine Angioedema     Past Medical History:   Diagnosis Date    HTN (hypertension)     Morbid obesity with BMI of 70 and over, adult [facility])      History reviewed. No pertinent surgical history.   Family History   Problem Relation Age of Onset    Hypertension Mother     No Known Problems Father      Social History   Substance Use Topics    Smoking status: Never Smoker    Smokeless tobacco: Never Used    Alcohol use Not on file       ROS   General ROS: negative for - chills or fever  Psychological ROS: negative for - anxiety or depression  Ophthalmic ROS: positive for - uses glasses  ENT ROS: negative for - headaches  Endocrine ROS: negative for - polydipsia/polyuria or temperature intolerance  Cardiovascular ROS: no chest pain or dyspnea on exertion  Gastrointestinal ROS: no abdominal pain, change in bowel habits, or black or bloody stools  Genito-Urinary ROS: no dysuria, trouble voiding, or hematuria  Neurological ROS: negative for - numbness/tingling or weakness  Dermatological ROS: negative for - rash or skin lesion changes    All other systems reviewed and are negative. Objective:Vitals:    10/27/17 1246   BP: 151/77   Pulse: 68   Resp: 16   Temp: 96.7 °F (35.9 °C)   TempSrc: Oral   SpO2: 94%   Weight: (!) 448 lb 9.6 oz (203.5 kg)   Height: 5' 2\" (1.575 m)     alert, well appearing, and in no distress, oriented to person, place, and time and morbid obesity  Mental status - normal mood, behavior, speech, dress, motor activity, and thought processes  Chest - clear to auscultation, no wheezes, rales or rhonchi, symmetric air entry  Heart - normal rate, regular rhythm, normal S1, S2, no murmurs, rubs, clicks or gallops  Neurological - cranial nerves II through XII intact, normal gait and station    Assessment/Plan:    1. Morbid obesity with BMI of 70 and over, adult ([facility]  I have reviewed/discussed the above normal BMI with the patient. I have recommended the following interventions: dietary management education, guidance, and counseling and encourage exercise . she is f/u with bariatric surgery counseling;        2. Essential hypertension  Goal <140/90; needs compliance and low salt diet with exercise  - verapamil ER (VERELAN PM) 100 mg capsule; Take 1 Cap by mouth nightly. Dispense: 30 Cap; Refill: 3  - hydroCHLOROthiazide (HYDRODIURIL) 25 mg tablet;  Take 1 Tab by mouth daily. Dispense: 30 Tab; Refill: 3    3. Prediabetes  Encourage healthy diet      Lab review: no lab studies available for review at time of visit      I have discussed the diagnosis with the patient and the intended plan as seen in the above orders. The patient has received an after-visit summary and questions were answered concerning future plans. I have discussed medication side effects and warnings with the patient as well. I have reviewed the plan of care with the patient, accepted their input and they are in agreement with the treatment goals. Follow-up Disposition:  Return in about 1 month (around 11/27/2017) for blood pressure.

## 2017-10-27 NOTE — PROGRESS NOTES
Deidra Cogan is a 33 y.o female that is present in the office for a routine appointment for weight management. 1. Have you been to the ER, urgent care clinic since your last visit? Hospitalized since your last visit? No    2. Have you seen or consulted any other health care providers outside of the [address] [place] [address] since your last visit? Include any pap smears or colon screening.  No

## 2017-11-08 ENCOUNTER — TELEPHONE (OUTPATIENT)
Dept: SURGERY | Age: 27
End: 2017-11-08

## 2017-11-28 ENCOUNTER — OFFICE VISIT (OUTPATIENT)
Dept: FAMILY MEDICINE CLINIC | Age: 27
End: 2017-11-28

## 2017-11-28 VITALS
OXYGEN SATURATION: 96 % | RESPIRATION RATE: 20 BRPM | HEART RATE: 102 BPM | BODY MASS INDEX: 53.92 KG/M2 | HEIGHT: 62 IN | SYSTOLIC BLOOD PRESSURE: 134 MMHG | TEMPERATURE: 97.9 F | WEIGHT: 293 LBS | DIASTOLIC BLOOD PRESSURE: 102 MMHG

## 2017-11-28 DIAGNOSIS — N30.01 ACUTE CYSTITIS WITH HEMATURIA: Primary | ICD-10-CM

## 2017-11-28 DIAGNOSIS — J06.9 VIRAL UPPER RESPIRATORY ILLNESS: ICD-10-CM

## 2017-11-28 DIAGNOSIS — R35.0 URINARY FREQUENCY: ICD-10-CM

## 2017-11-28 DIAGNOSIS — E66.01 MORBID OBESITY WITH BMI OF 70 AND OVER, ADULT (HCC): ICD-10-CM

## 2017-11-28 LAB
BILIRUB UR QL STRIP: NEGATIVE
GLUCOSE UR-MCNC: NEGATIVE MG/DL
KETONES P FAST UR STRIP-MCNC: NEGATIVE MG/DL
PH UR STRIP: 6 [PH] (ref 4.6–8)
PROT UR QL STRIP: NORMAL
QUICKVUE INFLUENZA TEST: NEGATIVE
SP GR UR STRIP: 1.01 (ref 1–1.03)
UA UROBILINOGEN AMB POC: NORMAL (ref 0.2–1)
URINALYSIS CLARITY POC: NORMAL
URINALYSIS COLOR POC: NORMAL
URINE BLOOD POC: NORMAL
URINE LEUKOCYTES POC: NORMAL
URINE NITRITES POC: POSITIVE
VALID INTERNAL CONTROL?: YES

## 2017-11-28 RX ORDER — PHENAZOPYRIDINE HYDROCHLORIDE 100 MG/1
100 TABLET, FILM COATED ORAL
Qty: 6 TAB | Refills: 0 | Status: SHIPPED | OUTPATIENT
Start: 2017-11-28 | End: 2017-11-30

## 2017-11-28 RX ORDER — NITROFURANTOIN 25; 75 MG/1; MG/1
100 CAPSULE ORAL 2 TIMES DAILY
Qty: 14 CAP | Refills: 0 | Status: SHIPPED | OUTPATIENT
Start: 2017-11-28 | End: 2018-02-15 | Stop reason: ALTCHOICE

## 2017-11-28 RX ORDER — TOPIRAMATE 25 MG/1
25 TABLET ORAL 2 TIMES DAILY WITH MEALS
Qty: 60 TAB | Refills: 1 | Status: SHIPPED | OUTPATIENT
Start: 2017-11-28 | End: 2018-02-15 | Stop reason: SDUPTHER

## 2017-11-28 NOTE — PROGRESS NOTES
Cait Mcneal is a 33 y.o.  female and presents with    Chief Complaint   Patient presents with    Cold Symptoms     Subjective:  Upper Respiratory Infection  Patient complains of symptoms of a URI. Symptoms include congestion, sore throat, cough and dry mouth and tongue. Onset of symptoms was 6 days ago, unchanged since that time. She also c/o achiness and left flank pain and headache for the past 6 days . She is drinking plenty of fluids. . Evaluation to date: none. Treatment to date: decongestants, analgesics. She became tearful due to pains increasing at night. She stated that when she was at the gym earlier, she became weak and had to helped to her car. She also said she has fever and chills, and is diaphoretic at night    She is also here for weight management after starting medications to suppress appetite. Patient Active Problem List   Diagnosis Code    Essential hypertension I10    Prediabetes R73.03    Morbid obesity with BMI of 70 and over, adult ([facility] E66.01, Z68.45    Advance care planning Z71.89     Patient Active Problem List    Diagnosis Date Noted    Prediabetes 02/21/2017    Morbid obesity with BMI of 70 and over, adult [facility]) 02/21/2017    Advance care planning 02/21/2017    Essential hypertension 02/07/2017     Current Outpatient Prescriptions   Medication Sig Dispense Refill    verapamil ER (VERELAN PM) 100 mg capsule Take 1 Cap by mouth nightly. 30 Cap 3    hydroCHLOROthiazide (HYDRODIURIL) 25 mg tablet Take 1 Tab by mouth daily. 30 Tab 3    topiramate (TOPAMAX) 25 mg tablet Take 1 Tab by mouth two (2) times daily (with meals). 60 Tab 1     Allergies   Allergen Reactions    Phentermine Angioedema     Past Medical History:   Diagnosis Date    HTN (hypertension)     Morbid obesity with BMI of 70 and over, adult [facility])      History reviewed. No pertinent surgical history.   Family History   Problem Relation Age of Onset    Hypertension Mother     No Known Problems Father      Social History   Substance Use Topics    Smoking status: Never Smoker    Smokeless tobacco: Never Used    Alcohol use Not on file       ROS   General ROS: positive for  - chills  Psychological ROS: negative for - anxiety or depression  Ophthalmic ROS: positive for - uses glasses  ENT ROS: positive for - headaches  Endocrine ROS: negative for - polydipsia/polyuria  Respiratory ROS: negative for - wheezing  Cardiovascular ROS: no chest pain or dyspnea on exertion  Gastrointestinal ROS: + abdominal pain, no change in bowel habits, or black or bloody stools  Genito-Urinary ROS: urinary frequency  Musculoskeletal ROS: negative for - joint pain  Neurological ROS: no TIA or stroke symptoms  Dermatological ROS: negative for - rash or skin lesion changes    All other systems reviewed and are negative.       Objective:Vitals:    11/28/17 1458   BP: (!) 134/102   Pulse: (!) 102   Resp: 20   Temp: 97.9 °F (36.6 °C)   TempSrc: Oral   SpO2: 96%   Weight: (!) 441 lb (200 kg)   Height: 5' 2\" (1.575 m)   PainSc:   9   PainLoc: Abdomen   LMP: 10/21/2017       alert, well appearing, and in no distress, oriented to person, place, and time and morbidly obese  Mental status - normal mood, behavior, speech, dress, motor activity, and thought processes  Eyes - pupils equal and reactive, extraocular eye movements intact  Ears - bilateral TM's and external ear canals normal  Nose - normal and patent, no erythema, discharge or polyps  Mouth - mucous membranes moist, pharynx normal without lesions  Neck - supple, no significant adenopathy  Chest - clear to auscultation, no wheezes, rales or rhonchi, symmetric air entry  Heart - normal rate, regular rhythm, normal S1, S2, no murmurs, rubs, clicks or gallops  Abdomen - sharp pain right lower quadrant  Neurological - cranial nerves II through XII intact, motor and sensory grossly normal bilaterally, normal muscle tone, no tremors, strength 5/5, normal gait and station    Assessment/Plan:    1. Acute cystitis with hematuria  Start abx after reviewing urinalysis  - nitrofurantoin, macrocrystal-monohydrate, (MACROBID) 100 mg capsule; Take 1 Cap by mouth two (2) times a day. Dispense: 14 Cap; Refill: 0  - phenazopyridine (PYRIDIUM) 100 mg tablet; Take 1 Tab by mouth three (3) times daily (after meals) for 2 days. Dispense: 6 Tab; Refill: 0    2. Morbid obesity with BMI of 70 and over, adult [facility])  Continue treatment and encourage continued healthy diet and exercise  - topiramate (TOPAMAX) 25 mg tablet; Take 1 Tab by mouth two (2) times daily (with meals). Dispense: 60 Tab; Refill: 1    3. Viral upper respiratory illness  Negative for flu; needs rest and analgesics  - AMB POC RAPID INFLUENZA TEST    4. Urinary frequency  UTI  - AMB POC URINALYSIS DIP STICK AUTO W/O MICRO      Lab review: labs reviewed, I note that urinalysis abnormal      I have discussed the diagnosis with the patient and the intended plan as seen in the above orders. The patient has received an after-visit summary and questions were answered concerning future plans. I have discussed medication side effects and warnings with the patient as well. I have reviewed the plan of care with the patient, accepted their input and they are in agreement with the treatment goals. Follow-up Disposition:  Return if symptoms worsen or fail to improve.

## 2017-11-28 NOTE — MR AVS SNAPSHOT
Visit Information Date & Time Provider Department Dept. Phone Encounter #  
 11/28/2017  2:15 PM Lexy Simmons, Khal[facility] [phone] [id number] Follow-up Instructions Return if symptoms worsen or fail to improve. Your Appointments 11/29/2017  3:00 PM  
NUTRITION COUNSELING with [facility] DIETICIAN Onur Sepulveda ([facility]) Appt Note: 1 of  4; Pt r/s  
 [address] [place] [address] [place] [facility] 290[address] [place] [address] 88 710 [facility] 951  
  
    
 12/14/2017 10:30 AM  
NUTRITION COUNSELING with [facility] DIETICIAN [address] ([facility]) Appt Note: 2 of [address] [facility] [address] [id number]  
[phone]  
  
    
 12/15/2017  2:00 PM  
Follow Up with MD Onur Francis ([facility]) Appt Note: midtrial appt [address] [facility] [address]  
  
   
 [address] [facility] 88 710 [facility] 951 Upcoming Health Maintenance Date Due  
 HPV AGE 9Y-34Y (1 of 3 - Female 3 Dose Series) 6/5/2002 DTaP/Tdap/Td series (1 - Tdap) 6/5/2012 PAP AKA CERVICAL CYTOLOGY 6/5/2012 Influenza Age 5 to Adult 8/1/2017 Allergies as of 11/28/2017  Review Complete On: 11/28/2017 By: Cris Kearns LPN Severity Noted Reaction Type Reactions Phentermine  02/07/2017    Angioedema Current Immunizations  Never Reviewed No immunizations on file. Not reviewed this visit You Were Diagnosed With   
  
 Codes Comments Acute cystitis with hematuria    -  Primary ICD-10-CM: N30.01 
ICD-9-CM: 595.0 Morbid obesity with BMI of 70 and over, adult [facility])     ICD-10-CM: E66.01, Z68.45 ICD-9-CM: 278.01, V85.45 Viral upper respiratory illness     ICD-10-CM: J06.9, B97.89 ICD-9-CM: 465.9 Urinary frequency     ICD-10-CM: R35.0 ICD-9-CM: 788.41 Vitals BP Pulse Temp Resp Height(growth percentile) Weight(growth percentile) (!) 134/102 (!) 102 97.9 °F (36.6 °C) (Oral) 20 5' 2\" (1.575 m) (!) 441 lb (200 kg) LMP SpO2 BMI OB Status Smoking Status 10/21/2017 96% 80.66 kg/m2 Having regular periods Never Smoker Vitals History BMI and BSA Data Body Mass Index Body Surface Area  
 80.66 kg/m 2 2.96 m 2 Preferred Pharmacy Pharmacy Name Phone [facility] [address], [address] [facility] [phone] Your Updated Medication List  
  
   
This list is accurate as of: 11/28/17  4:25 PM.  Always use your most recent med list.  
  
  
  
  
 hydroCHLOROthiazide 25 mg tablet Commonly known as:  HYDRODIURIL Take 1 Tab by mouth daily. nitrofurantoin (macrocrystal-monohydrate) 100 mg capsule Commonly known as:  MACROBID Take 1 Cap by mouth two (2) times a day. phenazopyridine 100 mg tablet Commonly known as:  PYRIDIUM Take 1 Tab by mouth three (3) times daily (after meals) for 2 days. topiramate 25 mg tablet Commonly known as:  TOPAMAX Take 1 Tab by mouth two (2) times daily (with meals). verapamil  mg capsule Commonly known as:  VERELAN PM  
Take 1 Cap by mouth nightly. Prescriptions Sent to Pharmacy Refills  
 topiramate (TOPAMAX) 25 mg tablet 1 Sig: Take 1 Tab by mouth two (2) times daily (with meals). Class: Normal  
 Pharmacy: [id number], [address] Ph #: [phone] Route: Oral  
 nitrofurantoin, macrocrystal-monohydrate, (MACROBID) 100 mg capsule 0 Sig: Take 1 Cap by mouth two (2) times a day. Class: Normal  
 Pharmacy: [address], [address] Ph #: [phone]  Route: Oral  
 phenazopyridine (PYRIDIUM) 100 mg tablet 0  
 Sig: Take 1 Tab by mouth three (3) times daily (after meals) for 2 days. Class: Normal  
 Pharmacy: [address], [address]  #: [phone] Route: Oral  
  
We Performed the Following AMB POC RAPID INFLUENZA TEST [65781 CPT(R)] AMB POC URINALYSIS DIP STICK AUTO W/O MICRO [96151 CPT(R)] Follow-up Instructions Return if symptoms worsen or fail to improve. Patient Instructions Urinary Tract Infection in Women: Care Instructions Your Care Instructions A urinary tract infection, or UTI, is a general term for an infection anywhere between the kidneys and the urethra (where urine comes out). Most UTIs are bladder infections. They often cause pain or burning when you urinate. UTIs are caused by bacteria and can be cured with antibiotics. Be sure to complete your treatment so that the infection goes away. Follow-up care is a key part of your treatment and safety. Be sure to make and go to all appointments, and call your doctor if you are having problems. It's also a good idea to know your test results and keep a list of the medicines you take. How can you care for yourself at home? · Take your antibiotics as directed. Do not stop taking them just because you feel better. You need to take the full course of antibiotics. · Drink extra water and other fluids for the next day or two. This may help wash out the bacteria that are causing the infection. (If you have kidney, heart, or liver disease and have to limit fluids, talk with your doctor before you increase your fluid intake.) · Avoid drinks that are carbonated or have caffeine. They can irritate the bladder. · Urinate often. Try to empty your bladder each time. · To relieve pain, take a hot bath or lay a heating pad set on low over your lower belly or genital area. Never go to sleep with a heating pad in place. To prevent UTIs · Drink plenty of water each day. This helps you urinate often, which clears bacteria from your system. (If you have kidney, heart, or liver disease and have to limit fluids, talk with your doctor before you increase your fluid intake.) · Urinate when you need to. · Urinate right after you have sex. · Change sanitary pads often. · Avoid douches, bubble baths, feminine hygiene sprays, and other feminine hygiene products that have deodorants. · After going to the bathroom, wipe from front to back. When should you call for help? Call your doctor now or seek immediate medical care if: 
? · Symptoms such as fever, chills, nausea, or vomiting get worse or appear for the first time. ? · You have new pain in your back just below your rib cage. This is called flank pain. ? · There is new blood or pus in your urine. ? · You have any problems with your antibiotic medicine. ? Watch closely for changes in your health, and be sure to contact your doctor if: 
? · You are not getting better after taking an antibiotic for 2 days. ? · Your symptoms go away but then come back. Where can you learn more? Go to http://chaya-eris.info/. Enter N203 in the search box to learn more about \"Urinary Tract Infection in Women: Care Instructions. \" Current as of: May 12, 2017 Content Version: 11.4 © 8891-1597 [employer]. Care instructions adapted under license by [employer] (which disclaims liability or warranty for this information). If you have questions about a medical condition or this instruction, always ask your healthcare professional. [address] any warranty or liability for your use of this information. Introducing [facility]! Princess Colindres introduces [employer] patient portal. Now you can access parts of your medical record, email your doctor's office, and request medication refills online.    
 
1. In your internet browser, go to [email or web address] 2. Click on the First Time User? Click Here link in the Sign In box. You will see the New Member Sign Up page. 3. Enter your [employer] Access Code exactly as it appears below. You will not need to use this code after youve completed the sign-up process. If you do not sign up before the expiration date, you must request a new code. · [employer] Access Code: [email or web address] Expires: 2/26/2018  4:25 PM 
 
4. Enter the last four digits of your Social Security Number (xxxx) and Date of Birth (mm/dd/yyyy) as indicated and click Submit. You will be taken to the next sign-up page. 5. Create a [employer] ID. This will be your [employer] login ID and cannot be changed, so think of one that is secure and easy to remember. 6. Create a [employer] password. You can change your password at any time. 7. Enter your Password Reset Question and Answer. This can be used at a later time if you forget your password. 8. Enter your e-mail address. You will receive e-mail notification when new information is available in [address] 9. Click Sign Up. You can now view and download portions of your medical record. 10. Click the Download Summary menu link to download a portable copy of your medical information. If you have questions, please visit the Frequently Asked Questions section of the [employer] website. Remember, [employer] is NOT to be used for urgent needs. For medical emergencies, dial 911. Now available from your iPhone and Android! Please provide this summary of care documentation to your next provider. Your primary care clinician is listed as Katalina Barillas. If you have any questions after today's visit, please call [phone].

## 2017-11-28 NOTE — PATIENT INSTRUCTIONS

## 2017-11-28 NOTE — PROGRESS NOTES
Brittani Fatima is a 33 y.o. female  Chief Complaint   Patient presents with    Cold Symptoms     1. Have you been to the ER, urgent care clinic since your last visit? Hospitalized since your last visit? No    2. Have you seen or consulted any other health care providers outside of the [address] [place] [address] since your last visit? Include any pap smears or colon screening.  No

## 2017-12-13 ENCOUNTER — TELEPHONE (OUTPATIENT)
Dept: SURGERY | Age: 27
End: 2017-12-13

## 2017-12-13 NOTE — TELEPHONE ENCOUNTER
Pt return my call regarding appointment. Pt was r/s for her mid trial appt and pt asked to r/s nutrient appt due to transportation conflict.

## 2017-12-13 NOTE — TELEPHONE ENCOUNTER
lvm for pt to return my call regarding rescheduling mid trial appointment due to patient has not started nutrient  classes yet.  Can be reached at [phone]

## 2017-12-15 ENCOUNTER — OFFICE VISIT (OUTPATIENT)
Dept: SURGERY | Age: 27
End: 2017-12-15

## 2018-02-15 ENCOUNTER — OFFICE VISIT (OUTPATIENT)
Dept: FAMILY MEDICINE CLINIC | Age: 28
End: 2018-02-15

## 2018-02-15 VITALS
DIASTOLIC BLOOD PRESSURE: 86 MMHG | WEIGHT: 293 LBS | RESPIRATION RATE: 20 BRPM | OXYGEN SATURATION: 96 % | TEMPERATURE: 97.6 F | BODY MASS INDEX: 53.92 KG/M2 | SYSTOLIC BLOOD PRESSURE: 137 MMHG | HEART RATE: 84 BPM | HEIGHT: 62 IN

## 2018-02-15 DIAGNOSIS — R63.2 BINGE EATING: ICD-10-CM

## 2018-02-15 DIAGNOSIS — I10 ESSENTIAL HYPERTENSION: ICD-10-CM

## 2018-02-15 DIAGNOSIS — E66.01 MORBID OBESITY WITH BMI OF 70 AND OVER, ADULT (HCC): ICD-10-CM

## 2018-02-15 DIAGNOSIS — E28.2 POLYCYSTIC OVARIAN SYNDROME: ICD-10-CM

## 2018-02-15 DIAGNOSIS — B34.9 VIRAL SYNDROME: Primary | ICD-10-CM

## 2018-02-15 LAB
HCG QL BLOOD POCT, HCGQLPOCT: NORMAL
HCG URINE, QL. (POC): NEGATIVE
VALID INTERNAL CONTROL?: YES

## 2018-02-15 RX ORDER — TOPIRAMATE 50 MG/1
50 TABLET, FILM COATED ORAL 2 TIMES DAILY WITH MEALS
Qty: 60 TAB | Refills: 1 | Status: SHIPPED | OUTPATIENT
Start: 2018-02-15 | End: 2018-05-02 | Stop reason: SDUPTHER

## 2018-02-15 RX ORDER — VERAPAMIL HYDROCHLORIDE 100 MG/1
100 CAPSULE, EXTENDED RELEASE ORAL
Qty: 30 CAP | Refills: 11 | Status: SHIPPED | OUTPATIENT
Start: 2018-02-15 | End: 2019-07-09

## 2018-02-15 NOTE — PROGRESS NOTES
Trevon Santos is a 33 y.o. female   Chief Complaint   Patient presents with    Medication Evaluation    Chills       1. Have you been to the ER, urgent care clinic since your last visit? Hospitalized since your last visit? No    2. Have you seen or consulted any other health care providers outside of the [address] [place] [address] since your last visit? Include any pap smears or colon screening.  No

## 2018-02-15 NOTE — PROGRESS NOTES
History and Physical  [facility]    Patient: Niles Dubois MRN: [id number]  SSN: [id number]    YOB: 1990  Age: 33 y.o. Sex: female      Subjective:      Niles Dubois is a 33 y.o. BLACK OR  female who presented for body aches. They started yesterday but have happened before. The body aches are all over her body. Ibuprofen that she took last night helped a little. They happen when she works out, but yesterday they happened without working out. She also complains of stomach cramps for 2 weeks that have been on and off. Headache last week while working out. They feel like a sharp stabbing pain in her stomach. 8/10 pain. It is in her entire abdomen, but worst in her epigastric area. Rest helps. It gets worse when she's moving or when she eats. Her last period was in the middle of December. They are normally irregular. She is not sexually active, but says that she might be pregnant. She is trying to lose weight and is scheduled to see a dietician later this month. She also has been having headaches for a few months, with the most recent one being last week. She feels overheated, like she has a fever since yesterday. She has had chills as well. Past Medical History:   Diagnosis Date    HTN (hypertension)     Morbid obesity with BMI of 70 and over, adult ([facility]      No past surgical history on file. Family History   Problem Relation Age of Onset    Hypertension Mother     No Known Problems Father      Social History   Substance Use Topics    Smoking status: Never Smoker    Smokeless tobacco: Never Used    Alcohol use Not on file      Prior to Admission medications    Medication Sig Start Date End Date Taking? Authorizing Provider   topiramate (TOPAMAX) 25 mg tablet Take 1 Tab by mouth two (2) times daily (with meals). 11/28/17  Yes Lashon Levy MD   verapamil ER (VERELAN PM) 100 mg capsule Take 1 Cap by mouth nightly.  10/27/17  Yes Lashon Levy MD hydroCHLOROthiazide (HYDRODIURIL) 25 mg tablet Take 1 Tab by mouth daily. 10/27/17  Yes Reza Brian MD   nitrofurantoin, macrocrystal-monohydrate, (MACROBID) 100 mg capsule Take 1 Cap by mouth two (2) times a day. 11/28/17   Reza Brian MD        Allergies   Allergen Reactions    Phentermine Angioedema       Review of Systems (positive bolded):  Constitutional: Negative for fever, chills and diaphoresis. HENT: Negative for hearing loss and sore throat. Eyes: Negative for blurred vision and double vision. Respiratory: Negative for cough and hemoptysis. Cardiovascular: Negative for chest pain and palpitations. Gastrointestinal: Negative for nausea and vomiting. abdominal pain  Genitourinary: Negative for dysuria and hematuria. Musculoskeletal: Negative for myalgias and joint pain. Skin: Negative for itching and rash. Neurological: Negative for dizziness and headaches. Psychiatric: Negative for depression and suicidal ideas. Objective:     Patient Vitals for the past 12 hrs:   Temp Pulse Resp BP SpO2   02/15/18 1115 97.6 °F (36.4 °C) 84 20 137/86 96 %       PHYSICAL:  General:  Alert and Responsive and in no acute distress. HEENT: Conjunctiva pink, sclera anicteric. PERRL. EOMI. Pharynx moist, nonerythematous. Moist mucous membranes. Thyroid not enlarged, no nodules. No cervical, supraclavicular, occipital or submandibular lymphadenopathy. No other gross abnormalities present. CV:  RRR, no murmurs, rubs, or gallops. PMI not displaced. No visible pulsations or thrills. No carotid bruits. RESP:  Unlabored breathing. Lungs clear to auscultation. no wheeze, rales, or rhonchi. Equal expansion bilaterally. ABD:  Soft, tenderness in epigastric area and URQ, nondistended. Normoactive bowel sounds. No hepatosplenomegaly. No suprapubic tenderness. No CVA tenderness. MS:  No joint deformity or instability. No atrophy.   Neuro:  5/5 strength bilateral upper extremities and lower extremities. CN II-XII intact. Sensation grossly intact. 2+ patellar reflexes bilaterally. A+Ox3. Ext:  No edema. 2+ radial and dp pulses bilaterally. Labs Reviewed:  No results found for this or any previous visit (from the past 24 hour(s)). Assessment/Plan: The plan listed below is the medical student's opinion only     1. Flu vs myalgia  · Recommend rest and fluids. 2. Abdominal Cramps  · Rule out pregnancy. Possible menstrual cramps. Consider peptic ulcer, pancreatitis, hepatitis, or gastritis. 3. Morbid Obesity  · Scheduled to see dietician. Recommend weight loss, diet, exercise. Collette Reaves, MS4  2/15/2018, 11:25 AM  [facility]  *ATTENTION:  This note has been created by a medical student for educational purposes only. Please do not refer to the content of this note for clinical decision-making, billing, or other purposes. Please see attending physicians note to obtain clinical information on this patient. *

## 2018-02-15 NOTE — PROGRESS NOTES
Benjamin Orr is a 33 y.o.  female and presents with    Chief Complaint   Patient presents with    Medication Evaluation    Chills     Subjective:  Ms. Fortino Lopez presents c/o body aches and overheated feeling. Started yesterday. Cramps for 2 weeks that have been on and off. Headache last week while working out. Headaches have been happening for a few months.      Body aches are all over her body. Ibuprofen that she took on Wednesday night helped a little. They happen when she works out, but yesterday they happened without working out.      With the feeling of overheated, like she has a fever. She has had chills as well.      Stomach cramps feel like a sharp stabbing pain in her stomach. 8/10 pain. It is in her entire abdomen, but worst in her epigastric area. Rest helps. It gets worse when she's moving or when she eats. Her last period was in the middle of December. They are normally irregular. She last had sex before her last period. She is trying to lose weight and is scheduled to see a dietician. Patient Active Problem List   Diagnosis Code    Essential hypertension I10    Prediabetes R73.03    Morbid obesity with BMI of 70 and over, adult ([facility] [address] E66.01, Z68.45    Advance care planning Z71.89     Patient Active Problem List    Diagnosis Date Noted    Prediabetes 02/21/2017    Morbid obesity with BMI of 70 and over, adult ([facility] [address] 02/21/2017    Advance care planning 02/21/2017    Essential hypertension 02/07/2017     Current Outpatient Prescriptions   Medication Sig Dispense Refill    topiramate (TOPAMAX) 25 mg tablet Take 1 Tab by mouth two (2) times daily (with meals). 60 Tab 1    verapamil ER (VERELAN PM) 100 mg capsule Take 1 Cap by mouth nightly. 30 Cap 3    hydroCHLOROthiazide (HYDRODIURIL) 25 mg tablet Take 1 Tab by mouth daily.  30 Tab 3     Allergies   Allergen Reactions    Phentermine Angioedema     Past Medical History:   Diagnosis Date    HTN (hypertension)     Morbid obesity with BMI of 70 and over, adult ([facility] [address]      No past surgical history on file. Family History   Problem Relation Age of Onset    Hypertension Mother     No Known Problems Father      Social History   Substance Use Topics    Smoking status: Never Smoker    Smokeless tobacco: Never Used    Alcohol use Not on file       ROS   Constitutional: Negative for fever, chills and diaphoresis. HENT: Negative for hearing loss and sore throat. Eyes: Negative for blurred vision and double vision. Respiratory: Negative for cough and hemoptysis. Cardiovascular: Negative for chest pain and palpitations. Gastrointestinal: Negative for nausea and vomiting. abdominal pain  Genitourinary: Negative for dysuria and hematuria. Musculoskeletal: Negative for myalgias and joint pain. Skin: Negative for itching and rash. Neurological: Negative for dizziness and headaches. Psychiatric: Negative for depression and suicidal ideas.        All other systems reviewed and are negative. Objective:  Vitals:    02/15/18 1115   BP: 137/86   Pulse: 84   Resp: 20   Temp: 97.6 °F (36.4 °C)   TempSrc: Oral   SpO2: 96%   Weight: (!) 442 lb (200.5 kg)   Height: 5' 2\" (1.575 m)   PainSc:   0 - No pain   LMP: 12/20/2017       General:  Alert and Responsive and in no acute distress. HEENT: Conjunctiva pink, sclera anicteric. PERRL. EOMI. Pharynx moist, nonerythematous. Moist mucous membranes. Thyroid not enlarged, no nodules. No cervical, supraclavicular, occipital or submandibular lymphadenopathy. No other gross abnormalities present. CV:  RRR, no murmurs, rubs, or gallops. PMI not displaced. No visible pulsations or thrills. No carotid bruits. RESP:  Unlabored breathing. Lungs clear to auscultation. no wheeze, rales, or rhonchi. Equal expansion bilaterally. ABD:  Soft, tenderness in epigastric area and URQ, nondistended. Normoactive bowel sounds. No hepatosplenomegaly. No suprapubic tenderness.   No CVA tenderness. MS:  No joint deformity or instability. No atrophy. Neuro:  5/5 strength bilateral upper extremities and lower extremities. CN II-XII intact. Sensation grossly intact. 2+ patellar reflexes bilaterally. A+Ox3. Ext:  No edema. 2+ radial and dp pulses bilaterally. Assessment/Plan:    1. Viral syndrome  Increase fluid intake; analgesics; salt water gargle    2. Polycystic ovarian syndrome  Continue current therapy    3. Morbid obesity with BMI of 70 and over, adult ([facility] [address]  Continue treatment  - topiramate (TOPAMAX) 50 mg tablet; Take 1 Tab by mouth two (2) times daily (with meals). Dispense: 60 Tab; Refill: 1    4. Essential hypertension  Goal <130/80; continue treatment  - verapamil ER (VERELAN PM) 100 mg capsule; Take 1 Cap by mouth nightly. Dispense: 30 Cap; Refill: 11    5. Binge eating  Attempt to decrease episode  - topiramate (TOPAMAX) 50 mg tablet; Take 1 Tab by mouth two (2) times daily (with meals). Dispense: 60 Tab; Refill: 1      Lab review: no lab studies available for review at time of visit      I have discussed the diagnosis with the patient and the intended plan as seen in the above orders. The patient has received an after-visit summary and questions were answered concerning future plans. I have discussed medication side effects and warnings with the patient as well. I have reviewed the plan of care with the patient, accepted their input and they are in agreement with the treatment goals. Follow-up Disposition:  Return in about 1 month (around 3/15/2018) for medication evaluation.

## 2018-02-15 NOTE — MR AVS SNAPSHOT
[facility] 
 
 
 [address] [facility] [address]  [address] [id number] 
[phone] Patient: Berdie Burkitt MRN: [id number] :1990 Visit Information Date & Time Provider Department Dept. Phone Encounter #  
 2/15/2018 11:30 AM Reagan Pina, [id number] [phone] [id number] Your Appointments 2018  3:00 PM  
NUTRITION COUNSELING with [facility] DIETICIAN Onur Sepulveda ([facility]) Appt Note: 2 of 6; pt r/s she doesn't know if insurance will cover program. Pt will call and see if she has bariatric rider. plk  
 [address] [facility] [address]  
  
   
 [address] [place] [address] [address] [facility] 951 3/15/2018 10:30 AM  
NUTRITION COUNSELING with [facility] DIETICIAN [address] ([facility]) Appt Note: 3 of 6  
 [address] [facility] [address] [address]  
[phone] 2018  2:15 PM  
Follow Up with Stephen Farah MD  
[address] [facility]) Appt Note: midtrial appt; $0 cp. .../mjh; insurance inactive. will call back once insurance become active again; .  
 [address] [facility] [address]  
  
   
 [address] [place] [address] [address] [facility] [address] Upcoming Health Maintenance Date Due  
 HPV AGE 9Y-34Y (1 of 3 - Female 3 Dose Series) 2002 DTaP/Tdap/Td series (1 - Tdap) 2012 PAP AKA CERVICAL CYTOLOGY 2012 Influenza Age 5 to Adult 2017 Allergies as of 2/15/2018  Review Complete On: 2/15/2018 By: Reagan Pina MD  
  
 Severity Noted Reaction Type Reactions Phentermine  2017    Angioedema Current Immunizations  Never Reviewed No immunizations on file. Not reviewed this visit You Were Diagnosed With   
  
 Codes Comments Viral syndrome    -  Primary ICD-10-CM: B34.9 ICD-9-CM: 079.99 Polycystic ovarian syndrome     ICD-10-CM: E28.2 ICD-9-CM: 256.4 Morbid obesity with BMI of 70 and over, adult [facility])     ICD-10-CM: E66.01, Z68.45 ICD-9-CM: 278.01, V85.45 Essential hypertension     ICD-10-CM: I10 
ICD-9-CM: 401.9 Binge eating     ICD-10-CM: R63.2 ICD-9-CM: 658. 6 Vitals BP Pulse Temp Resp Height(growth percentile) Weight(growth percentile) 137/86 84 97.6 °F (36.4 °C) (Oral) 20 5' 2\" (1.575 m) (!) 442 lb (200.5 kg) LMP SpO2 BMI OB Status Smoking Status 12/20/2017 96% 80.84 kg/m2 Having regular periods Never Smoker Vitals History BMI and BSA Data Body Mass Index Body Surface Area 80.84 kg/m 2 2.96 m 2 Preferred Pharmacy Pharmacy Name Phone [address], [address] [phone] Your Updated Medication List  
  
   
This list is accurate as of: 2/15/18 12:05 PM.  Always use your most recent med list.  
  
  
  
  
 hydroCHLOROthiazide 25 mg tablet Commonly known as:  HYDRODIURIL Take 1 Tab by mouth daily. topiramate 50 mg tablet Commonly known as:  TOPAMAX Take 1 Tab by mouth two (2) times daily (with meals). verapamil  mg capsule Commonly known as:  VERELAN PM  
Take 1 Cap by mouth nightly. Prescriptions Sent to Pharmacy Refills  
 verapamil ER (VERELAN PM) 100 mg capsule 11 Sig: Take 1 Cap by mouth nightly. Class: Normal  
 Pharmacy: [address], [address] Ph #: [phone] Route: Oral  
 topiramate (TOPAMAX) 50 mg tablet 1 Sig: Take 1 Tab by mouth two (2) times daily (with meals). Class: Normal  
 Pharmacy: [address], [address] Ph #: [phone] Route: Oral  
  
We Performed the Following AMB POC GONADOTROPIN, CHORIONIC (HCG); QUALITATIVE [32972 CPT(R)] Introducing [facility]! [facility] introduces [employer] patient portal. Now you can access parts of your medical record, email your doctor's office, and request medication refills online. 1. In your internet browser, go to [email or web address] 2. Click on the First Time User? Click Here link in the Sign In box. You will see the New Member Sign Up page. 3. Enter your [employer] Access Code exactly as it appears below. You will not need to use this code after youve completed the sign-up process. If you do not sign up before the expiration date, you must request a new code. · [employer] Access Code: [email or web address] Expires: 2/26/2018  4:25 PM 
 
4. Enter the last four digits of your Social Security Number (xxxx) and Date of Birth (mm/dd/yyyy) as indicated and click Submit. You will be taken to the next sign-up page. 5. Create a [employer] ID. This will be your [employer] login ID and cannot be changed, so think of one that is secure and easy to remember. 6. Create a [employer] password. You can change your password at any time. 7. Enter your Password Reset Question and Answer. This can be used at a later time if you forget your password. 8. Enter your e-mail address. You will receive e-mail notification when new information is available in [address] 9. Click Sign Up. You can now view and download portions of your medical record. 10. Click the Download Summary menu link to download a portable copy of your medical information. If you have questions, please visit the Frequently Asked Questions section of the [employer] website. Remember, [employer] is NOT to be used for urgent needs. For medical emergencies, dial 911. Now available from your iPhone and Android! Please provide this summary of care documentation to your next provider. Your primary care clinician is listed as Ariella Orellana. If you have any questions after today's visit, please call [phone].

## 2018-02-28 ENCOUNTER — DOCUMENTATION ONLY (OUTPATIENT)
Dept: SURGERY | Age: 28
End: 2018-02-28

## 2018-03-01 NOTE — PROGRESS NOTES
[facility]  [address] [facility], [facility]    Patient's Name: Pelon Medina                                  Age: 33 y.o. YOB: 1990                                          Sex: female  Date: 02/29/2018  Session: 1 of 6               Surgeon:  Dr. Tracey Das    Height: 5'2\"                    Weight: 448#           Starting weight with surgeon: 449#        Do you smoke?: no    Alcohol Intake:   I do not drink at all:x      Class Guidelines    Pt. Understand that if they miss a month, depending on insurance, they may have to start over. Pt. Also understand that the expectation is to lose  Or maintain weight. Weight gain may result in delaying surgery until the weight is off. EATING HABITS AND BEHAVIORS:  Pt. Struggles With:  Liquid calories:   Skipping breakfast:   Eating foods with a high amount of carbohydrates:   Eating High fat foods:   Eating large portions:   Making poor snack choices:  Eating out frequently:   Skipping meals:   Reading labels:   Drinking >48 oz fluids daily:   Getting sufficient physical activity/exercise:   Night time eating/snacking: x  Binge eating:   Eating often, even when not hungry (grazing):   Giving into peer pressure regarding eating/drinking:   Other:     Each class we spend time discussing the pre-op diet, diet progression and vitamin/mineral requirements as well as the Key Diet Principles. Each class we also cover lowering carbohydrate consumption. Keeping carbohydrates <25 grams per meal and avoiding added sugars is emphasized. Patient is educated on the effects that  carbohydrates and bad fats have on them. PHYSICAL ACTIVITY/EXERCISE:   Patient's activity is increasing because patient plans to or is:  Walking more:x x  Other aerobic activity such as running, swimming, Seferino, kickboxing ect. : xx  Chair exercises: x  Active in resistance training such as weight lifting: x  Other:     Each class we spend time discussing the importance of increasing activity. Patient can start with 10 minutes of walking daily or chair exercises and build from there. BEHAVIOR MODIFICATION:  Making modifications to behavior, patient plans to or is:  Eating only when hungry not to treat stress, anger, tiredness or boredom:   Eating away from TV, computer and phone:   Eating on a small plate:   Eats slowly, chewing food well: Other: We spend time in each class discussing the importance of breaking bad habits and how to do this. I encourage pt.s to self evaluate and look for those crucial moments in which they are making these poor choices. I recommend a food journal which can help identify when/where//why/who we are with when we compromise and make exceptions that are poor choices.      ADDITIONAL INFORMATION:  Specific changes patient made since the last class:  1st class    Sonia Brown RD  02/28/2018

## 2018-03-20 ENCOUNTER — DOCUMENTATION ONLY (OUTPATIENT)
Dept: SURGERY | Age: 28
End: 2018-03-20

## 2018-03-20 NOTE — PROGRESS NOTES
[address] [facility]  [address] [facility], [facility]    Patient's Name: Iesha Maciel                                  Age: 33 y.o. YOB: 1990                                          Sex: female  Date: 03/15/2018  Insurance: [employer]                          Session: 2 of 6               Surgeon:  Dr. Shiva Reed    Height: 5'2\"                    Weight: 451#           Starting weight with surgeon: 449#          Do you smoke?: no    Alcohol Intake:   I do not drink at all:x      Class Guidelines    Pt. Understand that if they miss a month, depending on insurance, they may have to start over. Pt. Also understand that the expectation is to lose  Or maintain weight. Weight gain may result in delaying surgery until the weight is off. EATING HABITS AND BEHAVIORS:  Pt. Struggles With:  Liquid calories: x  Skipping breakfast: x  Eating foods with a high amount of carbohydrates: x  Eating High fat foods:   Eating large portions:   Making poor snack choices:  Eating out frequently:   Skipping meals: x  Reading labels: x  Drinking >48 oz fluids daily:   Getting sufficient physical activity/exercise:   Night time eating/snacking:   Binge eating:   Eating often, even when not hungry (grazing):   Giving into peer pressure regarding eating/drinking:   Other:     Each class we spend time discussing the pre-op diet, diet progression and vitamin/mineral requirements as well as the Key Diet Principles. Each class we also cover lowering carbohydrate consumption. Keeping carbohydrates <25 grams per meal and avoiding added sugars is emphasized. Patient is educated on the effects that  carbohydrates and bad fats have on them. PHYSICAL ACTIVITY/EXERCISE:   Patient's activity is increasing because patient plans to or is:  Walking more: x  Other aerobic activity such as running, swimming, Seferino, kickboxing ect. : x  Chair exercises: x  Active in resistance training such as weight lifting:   Other:     Each class we spend time discussing the importance of increasing activity. Patient can start with 10 minutes of walking daily or chair exercises and build from there. BEHAVIOR MODIFICATION:  Making modifications to behavior, patient plans to or is:  Eating only when hungry not to treat stress, anger, tiredness or boredom:   Eating away from TV, computer and phone:   Eating on a small plate:   Eats slowly, chewing food well: Other: We spend time in each class discussing the importance of breaking bad habits and how to do this. I encourage pt.s to self evaluate and look for those crucial moments in which they are making these poor choices. I recommend a food journal which can help identify when/where//why/who we are with when we compromise and make exceptions that are poor choices. ADDITIONAL INFORMATion:  Patient knows she should lose during this WLT season. Patient still snacking on chips and cookies. Patient still drinking sweet tea. RD emphasized decreasing sugar/sweets/starches. If patient is not understanding and continues to gain weight I may need to teach on a one to one level.     Halina Almanzar RD  03/15/2018

## 2018-04-13 ENCOUNTER — TELEPHONE (OUTPATIENT)
Dept: SURGERY | Age: 28
End: 2018-04-13

## 2018-04-13 DIAGNOSIS — I10 ESSENTIAL HYPERTENSION: ICD-10-CM

## 2018-04-13 DIAGNOSIS — E66.01 MORBID OBESITY WITH BMI OF 70 AND OVER, ADULT (HCC): Primary | ICD-10-CM

## 2018-04-13 NOTE — TELEPHONE ENCOUNTER
----- Message from Bebeto Jimenez sent at 4/13/2018  9:22 AM EDT -----  Can you put in labs for this patient please and thank you

## 2018-04-17 ENCOUNTER — DOCUMENTATION ONLY (OUTPATIENT)
Dept: SURGERY | Age: 28
End: 2018-04-17

## 2018-04-17 NOTE — PROGRESS NOTES
[facility]  [address] [facility], [address]    Patient's Name: Linda Pallas                                  Age: 33 y.o. YOB: 1990                                          Sex: female  Date: 04/12/2018  Insurance: [employer]                          Session: 3 of 6               Surgeon:  Dr. Sukhdeep Ribeiro    Height: 5'2\"                    Weight: 452#           Starting weight with surgeon: 449#          Do you smoke?: no    Alcohol Intake:   I do not drink at all:x      Class Guidelines    Pt. Understand that if they miss a month, depending on insurance, they may have to start over. Pt. Also understand that the expectation is to lose  Or maintain weight. Weight gain may result in delaying surgery until the weight is off. EATING HABITS AND BEHAVIORS:  Pt. Struggles With:  Liquid calories: x  Skipping breakfast: x  Eating foods with a high amount of carbohydrates:   Eating High fat foods:   Eating large portions:   Making poor snack choices:x  Eating out frequently:   Skipping meals:   Reading labels: x  Drinking >48 oz fluids daily:   Getting sufficient physical activity/exercise:   Night time eating/snacking:   Binge eating:   Eating often, even when not hungry (grazing):   Giving into peer pressure regarding eating/drinking:   Other:     Each class we spend time discussing the pre-op diet, diet progression and vitamin/mineral requirements as well as the Key Diet Principles. Each class we also cover lowering carbohydrate consumption. Keeping carbohydrates <25 grams per meal and avoiding added sugars is emphasized. Patient is educated on the effects that  carbohydrates and bad fats have on them. PHYSICAL ACTIVITY/EXERCISE:   Patient's activity is increasing because patient plans to or is:  Walking more: x  Other aerobic activity such as running, swimming, Seferino, kickboxing ect. : x  Chair exercises:    Active in resistance training such as weight lifting:   Other:     Each class we spend time discussing the importance of increasing activity. Patient can start with 10 minutes of walking daily or chair exercises and build from there. BEHAVIOR MODIFICATION:  Making modifications to behavior, patient plans to or is:  Eating only when hungry not to treat stress, anger, tiredness or boredom:   Eating away from TV, computer and phone:   Eating on a small plate:   Eats slowly, chewing food well: Other: We spend time in each class discussing the importance of breaking bad habits and how to do this. I encourage pt.s to self evaluate and look for those crucial moments in which they are making these poor choices. I recommend a food journal which can help identify when/where//why/who we are with when we compromise and make exceptions that are poor choices. ADDITIONAL INFORMATION:  Specific changes patient made since the last class:  MANDI concerns:  Patient continues to gain weight slowly. I do not hear that she has applied any of what she has learned. I believe she struggles with understanding what is expected. MANDI has set up a one on one with patient for the next month. MANDI also gave specific instructions to avoid cereal and french fries, foods she frequently eats.       Corinne Lauren RD  04/12/2018

## 2018-05-02 DIAGNOSIS — E66.01 MORBID OBESITY WITH BMI OF 70 AND OVER, ADULT (HCC): ICD-10-CM

## 2018-05-02 DIAGNOSIS — R63.2 BINGE EATING: ICD-10-CM

## 2018-05-02 RX ORDER — TOPIRAMATE 50 MG/1
TABLET, FILM COATED ORAL
Qty: 60 TAB | Refills: 1 | Status: SHIPPED | OUTPATIENT
Start: 2018-05-02 | End: 2018-10-30

## 2018-05-25 ENCOUNTER — HOSPITAL ENCOUNTER (OUTPATIENT)
Dept: PREADMISSION TESTING | Age: 28
Discharge: HOME | End: 2018-05-25
Payer: MEDICARE

## 2018-05-25 ENCOUNTER — DOCUMENTATION ONLY (OUTPATIENT)
Dept: FAMILY MEDICINE CLINIC | Age: 28
End: 2018-05-25

## 2018-05-25 ENCOUNTER — OFFICE VISIT (OUTPATIENT)
Dept: SURGERY | Age: 28
End: 2018-05-25

## 2018-05-25 VITALS
HEART RATE: 77 BPM | RESPIRATION RATE: 22 BRPM | BODY MASS INDEX: 53.92 KG/M2 | OXYGEN SATURATION: 97 % | TEMPERATURE: 96.5 F | DIASTOLIC BLOOD PRESSURE: 93 MMHG | HEIGHT: 62 IN | WEIGHT: 293 LBS | SYSTOLIC BLOOD PRESSURE: 163 MMHG

## 2018-05-25 DIAGNOSIS — E66.01 MORBID OBESITY DUE TO EXCESS CALORIES (HCC): Primary | ICD-10-CM

## 2018-05-25 DIAGNOSIS — E66.01 MORBID OBESITY WITH BMI OF 70 AND OVER, ADULT (HCC): ICD-10-CM

## 2018-05-25 DIAGNOSIS — I10 ESSENTIAL HYPERTENSION: ICD-10-CM

## 2018-05-25 LAB
25(OH)D3 SERPL-MCNC: 6.9 NG/ML (ref 30–100)
ALBUMIN SERPL-MCNC: 3.3 G/DL (ref 3.4–5)
ALBUMIN/GLOB SERPL: 0.8 {RATIO} (ref 0.8–1.7)
ALP SERPL-CCNC: 124 U/L (ref 45–117)
ALT SERPL-CCNC: 22 U/L (ref 13–56)
ANION GAP SERPL CALC-SCNC: 7 MMOL/L (ref 3–18)
AST SERPL-CCNC: 15 U/L (ref 15–37)
BASOPHILS # BLD: 0 K/UL (ref 0–0.06)
BASOPHILS NFR BLD: 0 % (ref 0–2)
BILIRUB SERPL-MCNC: 0.2 MG/DL (ref 0.2–1)
BUN SERPL-MCNC: 17 MG/DL (ref 7–18)
BUN/CREAT SERPL: 19 (ref 12–20)
CALCIUM SERPL-MCNC: 9.1 MG/DL (ref 8.5–10.1)
CHLORIDE SERPL-SCNC: 104 MMOL/L (ref 100–108)
CHOLEST SERPL-MCNC: 185 MG/DL
CO2 SERPL-SCNC: 27 MMOL/L (ref 21–32)
CREAT SERPL-MCNC: 0.9 MG/DL (ref 0.6–1.3)
DIFFERENTIAL METHOD BLD: NORMAL
EOSINOPHIL # BLD: 0.2 K/UL (ref 0–0.4)
EOSINOPHIL NFR BLD: 3 % (ref 0–5)
ERYTHROCYTE [DISTWIDTH] IN BLOOD BY AUTOMATED COUNT: 13.5 % (ref 11.6–14.5)
FERRITIN SERPL-MCNC: 56 NG/ML (ref 8–388)
FOLATE SERPL-MCNC: >20 NG/ML (ref 3.1–17.5)
GLOBULIN SER CALC-MCNC: 4.3 G/DL (ref 2–4)
GLUCOSE SERPL-MCNC: 80 MG/DL (ref 74–99)
HCT VFR BLD AUTO: 39.3 % (ref 35–45)
HDLC SERPL-MCNC: 57 MG/DL (ref 40–60)
HDLC SERPL: 3.2 {RATIO} (ref 0–5)
HGB BLD-MCNC: 12.4 G/DL (ref 12–16)
IRON SERPL-MCNC: 38 UG/DL (ref 50–175)
LDLC SERPL CALC-MCNC: 109.6 MG/DL (ref 0–100)
LIPID PROFILE,FLP: ABNORMAL
LYMPHOCYTES # BLD: 2.5 K/UL (ref 0.9–3.6)
LYMPHOCYTES NFR BLD: 37 % (ref 21–52)
MCH RBC QN AUTO: 27 PG (ref 24–34)
MCHC RBC AUTO-ENTMCNC: 31.6 G/DL (ref 31–37)
MCV RBC AUTO: 85.4 FL (ref 74–97)
MONOCYTES # BLD: 0.6 K/UL (ref 0.05–1.2)
MONOCYTES NFR BLD: 9 % (ref 3–10)
NEUTS SEG # BLD: 3.5 K/UL (ref 1.8–8)
NEUTS SEG NFR BLD: 51 % (ref 40–73)
PLATELET # BLD AUTO: 405 K/UL (ref 135–420)
PMV BLD AUTO: 10.6 FL (ref 9.2–11.8)
POTASSIUM SERPL-SCNC: 4.6 MMOL/L (ref 3.5–5.5)
PROT SERPL-MCNC: 7.6 G/DL (ref 6.4–8.2)
RBC # BLD AUTO: 4.6 M/UL (ref 4.2–5.3)
SODIUM SERPL-SCNC: 138 MMOL/L (ref 136–145)
TRIGL SERPL-MCNC: 92 MG/DL (ref ?–150)
VIT B12 SERPL-MCNC: 391 PG/ML (ref 211–911)
VLDLC SERPL CALC-MCNC: 18.4 MG/DL
WBC # BLD AUTO: 6.8 K/UL (ref 4.6–13.2)

## 2018-05-25 PROCEDURE — 83540 ASSAY OF IRON: CPT | Performed by: SURGERY

## 2018-05-25 PROCEDURE — 82306 VITAMIN D 25 HYDROXY: CPT | Performed by: SURGERY

## 2018-05-25 PROCEDURE — 80053 COMPREHEN METABOLIC PANEL: CPT | Performed by: SURGERY

## 2018-05-25 PROCEDURE — 36415 COLL VENOUS BLD VENIPUNCTURE: CPT | Performed by: SURGERY

## 2018-05-25 PROCEDURE — 82607 VITAMIN B-12: CPT | Performed by: SURGERY

## 2018-05-25 PROCEDURE — 80061 LIPID PANEL: CPT | Performed by: SURGERY

## 2018-05-25 PROCEDURE — 83036 HEMOGLOBIN GLYCOSYLATED A1C: CPT | Performed by: SURGERY

## 2018-05-25 PROCEDURE — 85025 COMPLETE CBC W/AUTO DIFF WBC: CPT | Performed by: SURGERY

## 2018-05-25 PROCEDURE — 84425 ASSAY OF VITAMIN B-1: CPT | Performed by: SURGERY

## 2018-05-25 PROCEDURE — 82728 ASSAY OF FERRITIN: CPT | Performed by: SURGERY

## 2018-05-25 NOTE — PROGRESS NOTES
1. Have you been to the ER, urgent care clinic since your last visit? Hospitalized since your last visit? No    2. Have you seen or consulted any other health care providers outside of the [address] [place] [address] since your last visit? Include any pap smears or colon screening. Xiomara    Eber Triplett is a 33 y.o. female who presents today for a bariatric mid trial surgical evaluation to assess their progress towards having bariatric surgery. Patient's BMI today is Body mass index is 84.14 kg/(m^2). Pankaj Singh

## 2018-05-25 NOTE — PATIENT INSTRUCTIONS
If you have any questions or concerns about today's appointment, the verbal and/or written instructions you were given for follow up care, please call our office at [phone].     [facility] - [address]    [phone] office  [phone]

## 2018-05-25 NOTE — LETTER
5/25/2018 Lucina Lyons [address] [id number] Dear Ms. Lucina Lyons, We had an appointment reserved for you 5/23/18 and were concerned when you did not show or call within 24 hours to cancel the appointment. Our policy is to call patients two days prior to their appointment to remind them of the date and time. We perform these calls as a courtesy to our patients and to allow us the opportunity to rebook the time slot should the appointment not be necessary. Recognizing that everyones time is valuable and that appointment time is limited, we ask that you provide 24 hours notice if you are unable to keep your appointment. Please call us at your earliest convenience to reschedule your appointment as your provider felt it was important to see you. Thank you for your anticipated cooperation. The scheduling staff: 
 
[address] [place] [address],[address] [phone]

## 2018-05-25 NOTE — PROGRESS NOTES
Bariatric Preoperative Progress note:    Subjective:     Truman Babb is a 33 y.o. female who presents today for followup of their candidacy for bariatric surgery. Since last seen, has been seen at [place] [id number] for abdominal pain. She had an ultrasound, but no stones were seen. Patient last attended a nutrition class with Rodríguez Rodriguez since April 17. She is drinking SlimFast and eating protein bars. She notes she is cutting down on fries and subs and is eating soups,  Salads and baked potatoes. She has gained 11lbs since last seeing me. She doesn't feel that she has been eating any junk food and doesn't understand where the weight is coming from. Today for breakfast she had Special K cereal and unsweetened [place] milk. For supper last night she had Pancit from an order out restaurant. She is taking in a lot of cream based soups. She is walking and occasionally riding the bike and going to the gym. She likes to get in the pool and do laps and use the treadmill. Past Medical History:   Diagnosis Date    HTN (hypertension)     Morbid obesity with BMI of 70 and over, adult [facility])        History reviewed. No pertinent surgical history. Current Outpatient Prescriptions   Medication Sig Dispense Refill    topiramate (TOPAMAX) 50 mg tablet TAKE 1 TABLET BY MOUTH TWICE DAILY WITH MEALS 60 Tab 1    verapamil ER (VERELAN PM) 100 mg capsule Take 1 Cap by mouth nightly. 30 Cap 11    hydroCHLOROthiazide (HYDRODIURIL) 25 mg tablet Take 1 Tab by mouth daily.  30 Tab 3       Allergies   Allergen Reactions    Phentermine Angioedema         Objective:     Physical Exam:  Visit Vitals    BP (!) 163/93 (BP 1 Location: Right arm, BP Patient Position: Sitting)    Pulse 77    Temp 96.5 °F (35.8 °C) (Oral)    Resp 22    Ht 5' 2\" (1.575 m)    Wt (!) 208.7 kg (460 lb)    SpO2 97%    BMI 84.14 kg/m2       General: Well developed, well nourished 33 y.o. female in no acute distress  ENMT: normocephalic, atraumatic mouth:clear, no overt lesions, oral mucosa pink and moist  Neck: supple, no masses, no adenopathy or carotid bruits, trachea midline  Skin: warm, smooth, dry and well perfused  Respiratory: clear to auscultation bilaterally, no wheezes, rhonchi or rales, excursions normal and symmetrical  Cardiovascular: Regular rate and rhythm, no murmurs, clicks, gallops or rubs, no edema or varicosities  Gastrointestinal: soft, nontender, nondistended, normoactive bowel sounds, no hernias, no hepatosplenomegaly, nonpalpable costal margins  Musculoskeletal: warm, well-perfused, no tenderness or swelling, normal gait/station  Neuro: sensation and strength grossly intact and symmetrical  Psych: alert and oriented to person, place and time      Studies to date:    Labs: significant for pending    Nutritional evaluation: ongoing    Psychiatric evaluation:pending            Assessment:   Dayanna Bueno is a 33 y.o. female who is progressing through the bariatric preoperative evaluation. At this time, they are not an appropriate candidate for weight loss surgery. Patient has gained 11lbs since initial consultation at which time she was told we would need to lose approx 50lbs prior to undertaking surgery due to her nonpalpable costal margins. We have discussed the principles of avoiding sweetened beverages, focusing on lean meat and vegetables. Upon further discussion, it appears she is eating a lot of cream based soups from [facility] and we have discussed favoring broth based soups. We have also reviewed the exercise recommendations.     Plan:   -complete remainder of preop evaluation including remaining dietary classes, labs, EKG, psychological evaluation  -Follow up once has completed the above to determine next steps

## 2018-05-26 LAB
EST. AVERAGE GLUCOSE BLD GHB EST-MCNC: 123 MG/DL
HBA1C MFR BLD: 5.9 % (ref 4.2–5.6)

## 2018-05-31 ENCOUNTER — DOCUMENTATION ONLY (OUTPATIENT)
Dept: SURGERY | Age: 28
End: 2018-05-31

## 2018-05-31 LAB — VIT B1 BLD-SCNC: 131.6 NMOL/L (ref 66.5–200)

## 2018-05-31 NOTE — PROGRESS NOTES
[facility]  [address], [facility]    Patient's Name: Julia Alas                                  Age: 33 y.o. YOB: 1990                                          Sex: female  Date: 05/30/2018  Insurance: [employer]                          Session: 4 of 6               Surgeon:  Dr. Mignon Alas    Height: 5'2\"                    Weight: 458#           Starting weight with surgeon: 449#          Do you smoke?: no    Alcohol Intake:   I do not drink at all:x      Class Guidelines    Pt. Understand that if they miss a month, depending on insurance, they may have to start over. Pt. Also understand that the expectation is to lose  Or maintain weight. Weight gain may result in delaying surgery until the weight is off. EATING HABITS AND BEHAVIORS:  Pt. Struggles With:  Liquid calories:   Skipping breakfast:   Eating foods with a high amount of carbohydrates: x  Eating High fat foods:   Eating large portions:   Making poor snack choices:x  Eating out frequently:   Skipping meals:   Reading labels: x  Drinking >48 oz fluids daily: x  Getting sufficient physical activity/exercise: x  Night time eating/snacking: x  Binge eating:   Eating often, even when not hungry (grazing):   Giving into peer pressure regarding eating/drinking:   Other:     Each class we spend time discussing the pre-op diet, diet progression and vitamin/mineral requirements as well as the Key Diet Principles. Each class we also cover lowering carbohydrate consumption. Keeping carbohydrates <25 grams per meal and avoiding added sugars is emphasized. Patient is educated on the effects that  carbohydrates and bad fats have on them. PHYSICAL ACTIVITY/EXERCISE:   Patient's activity is increasing because patient plans to or is:  Walking more: x  Other aerobic activity such as running, swimming, [place], kickboxing ect. : x  Chair exercises:    Active in resistance training such as weight lifting:   Other:     Each class we spend time discussing the importance of increasing activity. Patient can start with 10 minutes of walking daily or chair exercises and build from there. BEHAVIOR MODIFICATION:  Making modifications to behavior, patient plans to or is:  Eating only when hungry not to treat stress, anger, tiredness or boredom: x  Eating away from TV, computer and phone:   Eating on a small plate: x  Eats slowly, chewing food well: Other: We spend time in each class discussing the importance of breaking bad habits and how to do this. I encourage pt.s to self evaluate and look for those crucial moments in which they are making these poor choices. I recommend a food journal which can help identify when/where//why/who we are with when we compromise and make exceptions that are poor choices. ADDITIONAL INFORMATION:    RD's concerns/opinion's:  Patient continues to gain weight despite many one on one moments with her. I have taught her a few time how to read labels but she is struggling with the concept. She choose, eats it and asks me in the next class without applying the knowledge. There is an apparent learning disability.       Lisa Horton RD  05/30/2018

## 2018-06-04 ENCOUNTER — DOCUMENTATION ONLY (OUTPATIENT)
Dept: SURGERY | Age: 28
End: 2018-06-04

## 2018-06-04 NOTE — PROGRESS NOTES
Patient has been called and a message left for her to begin 5000 iu vitamin D3 daily as well as 65 mg iron BID.

## 2018-06-12 ENCOUNTER — TELEPHONE (OUTPATIENT)
Dept: SURGERY | Age: 28
End: 2018-06-12

## 2018-06-12 NOTE — TELEPHONE ENCOUNTER
Patient called in and wanted to inform Dr. Benjamin Faulkner that she had seen Dr. Sedrick Walter. Stated Dr. Sedrick Walter wanted her to speak with Dr. Benjamin Faulkner about the two possible surgeries before making a decision. I offered to schedule patient and appointment when Dr. Benjamin Faulkner returned from [place] [id number], patient stated she did not want to set an appointment but just wanted to let her know and will call back at a later date to schedule appointment.

## 2018-06-27 ENCOUNTER — DOCUMENTATION ONLY (OUTPATIENT)
Dept: SURGERY | Age: 28
End: 2018-06-27

## 2018-06-28 NOTE — PROGRESS NOTES
[facility]  [address] [facility], [address]    Patient's Name: Vinnie Navarro                                  Age: 33 y.o. YOB: 1990                                          Sex: female  Date: 06/21/2018  Insurance: [employer]                          Session: 5 of 6               Surgeon:  Dr. Coleman Lester    Height: 5'2'                    Weight: 455#           Starting weight with surgeon: 449#          Do you smoke?: no    Alcohol Intake:   I do not drink at all:x      Class Guidelines    Pt. Understand that if they miss a month, depending on insurance, they may have to start over. Pt. Also understand that the expectation is to lose  Or maintain weight. Weight gain may result in delaying surgery until the weight is off. EATING HABITS AND BEHAVIORS:  Pt. Struggles With:  Liquid calories: x  Skipping breakfast: x  Eating foods with a high amount of carbohydrates: x  Eating High fat foods:   Eating large portions:   Making poor snack choices:x  Eating out frequently:   Skipping meals:   Reading labels:   Drinking >48 oz fluids daily:   Getting sufficient physical activity/exercise:   Night time eating/snacking: x  Binge eating:   Eating often, even when not hungry (grazing):   Giving into peer pressure regarding eating/drinking:   Other:     Each class we spend time discussing the pre-op diet, diet progression and vitamin/mineral requirements as well as the Key Diet Principles. Each class we also cover lowering carbohydrate consumption. Keeping carbohydrates <25 grams per meal and avoiding added sugars is emphasized. Patient is educated on the effects that  carbohydrates and bad fats have on them. PHYSICAL ACTIVITY/EXERCISE:   Patient's activity is increasing because patient plans to or is:  Walking more: x  Other aerobic activity such as running, swimming, [place], kickboxing ect. : x  Chair exercises: x  Active in resistance training such as weight lifting:   Other:     Each class we spend time discussing the importance of increasing activity. Patient can start with 10 minutes of walking daily or chair exercises and build from there. BEHAVIOR MODIFICATION:  Making modifications to behavior, patient plans to or is:  Eating only when hungry not to treat stress, anger, tiredness or boredom:   Eating away from TV, computer and phone:   Eating on a small plate:   Eats slowly, chewing food well: Other: We spend time in each class discussing the importance of breaking bad habits and how to do this. I encourage pt.s to self evaluate and look for those crucial moments in which they are making these poor choices. I recommend a food journal which can help identify when/where//why/who we are with when we compromise and make exceptions that are poor choices. ADDITIONAL INFORMATION:  Specific changes patient made since the last class:  Nothing written. RD's concerns/opinion's:  Patient struggles but I have recently  noticed she is trying to apply some of what is taught.     Laura Collins RD  06/21/2018

## 2018-07-28 ENCOUNTER — DOCUMENTATION ONLY (OUTPATIENT)
Dept: SURGERY | Age: 28
End: 2018-07-28

## 2018-07-28 NOTE — PROGRESS NOTES
[facility]  [address], [address]    Patient's Name: Harley Main                                  Age: 33 y.o. YOB: 1990                                          Sex: female  Date: 07/18/2018  Insurance: [employer]                          Session: 6 of 6               Surgeon:  Dr. Hartley Lakeland    Height: 5'2\"                    Weight: 459#           Starting weight with surgeon: 449#          Do you smoke?: no    Alcohol Intake:   I do not drink at all:x      Class Guidelines    Pt. Understand that if they miss a month, depending on insurance, they may have to start over. Pt. Also understand that the expectation is to lose  Or maintain weight. Weight gain may result in delaying surgery until the weight is off. EATING HABITS AND BEHAVIORS:  Pt. Struggles With:  Liquid calories: x  Skipping breakfast:   Eating foods with a high amount of carbohydrates: x  Eating High fat foods: x  Eating large portions: x  Making poor snack choices:x  Eating out frequently: x  Skipping meals: x  Reading labels: x  Drinking >48 oz fluids daily: x  Getting sufficient physical activity/exercise: x  Night time eating/snacking: x  Binge eating:   Eating often, even when not hungry (grazing):   Giving into peer pressure regarding eating/drinking:   Other:     Each class we spend time discussing the pre-op diet, diet progression and vitamin/mineral requirements as well as the Key Diet Principles. Each class we also cover lowering carbohydrate consumption. Keeping carbohydrates <25 grams per meal and avoiding added sugars is emphasized. Patient is educated on the effects that  carbohydrates and bad fats have on them. PHYSICAL ACTIVITY/EXERCISE:   Patient's activity is increasing because patient is:  Walking more: x  Other aerobic activity such as running, swimming, [place], kickboxing ect. : x  Chair exercises: x  Active in resistance training such as weight lifting:   Other:     Each class we spend time discussing the importance of increasing activity. Patient can start with 10 minutes of walking daily or chair exercises and build from there. BEHAVIOR MODIFICATION:  Making modifications to behavior, patient is:  Eating only when hungry not to treat stress, anger, tiredness or boredom:   Eating away from TV, computer and phone:   Eating on a small plate: x  Eats slowly, chewing food well: Other: We spend time in each class discussing the importance of breaking bad habits and how to do this. I encourage pt.s to self evaluate and look for those crucial moments in which they are making these poor choices. I recommend a food journal which can help identify when/where//why/who we are with when we compromise and make exceptions that are poor choices. ADDITIONAL INFORMATION:  Specific changes patient made since the last class:  Nothing written. RD's concerns/opinion's:  Patient has gained weight over the WLT. Dr. Arturo Gregory requires that she lose 50# from her 1st weight with surgeon of 449#    Patient needs to be under 400#.       Loida Ramsay RD  07/18/2018

## 2018-10-02 ENCOUNTER — TELEPHONE (OUTPATIENT)
Dept: SURGERY | Age: 28
End: 2018-10-02

## 2018-10-19 ENCOUNTER — OFFICE VISIT (OUTPATIENT)
Dept: SURGERY | Age: 28
End: 2018-10-19

## 2018-10-19 VITALS
BODY MASS INDEX: 53.92 KG/M2 | HEIGHT: 62 IN | WEIGHT: 293 LBS | HEART RATE: 87 BPM | SYSTOLIC BLOOD PRESSURE: 130 MMHG | TEMPERATURE: 98.4 F | RESPIRATION RATE: 20 BRPM | DIASTOLIC BLOOD PRESSURE: 94 MMHG

## 2018-10-19 DIAGNOSIS — I10 ESSENTIAL HYPERTENSION: ICD-10-CM

## 2018-10-19 DIAGNOSIS — E66.01 MORBID OBESITY WITH BMI OF 70 AND OVER, ADULT (HCC): Primary | ICD-10-CM

## 2018-10-19 RX ORDER — METFORMIN HYDROCHLORIDE 500 MG/1
500 TABLET ORAL
COMMUNITY
End: 2018-12-11 | Stop reason: ALTCHOICE

## 2018-10-19 RX ORDER — IBUPROFEN 800 MG/1
800 TABLET ORAL
COMMUNITY
Start: 2017-04-20 | End: 2018-10-30

## 2018-10-19 RX ORDER — HYDROCHLOROTHIAZIDE 25 MG/1
25 TABLET ORAL
COMMUNITY
End: 2018-12-11

## 2018-10-19 RX ORDER — OMEPRAZOLE 20 MG/1
20 CAPSULE, DELAYED RELEASE ORAL
COMMUNITY
Start: 2018-05-21 | End: 2021-10-12

## 2018-10-19 NOTE — PROGRESS NOTES
Angel Marc is a 33 y.o. female who presents today with   Chief Complaint   Patient presents with    Morbid Obesity                Body mass index is 84.32 kg/m². 1. Have you been to the ER, urgent care clinic since your last visit? Hospitalized since your last visit? No    2. Have you seen or consulted any other health care providers outside of the [facility] since your last visit? Include any pap smears or colon screening.  No

## 2018-10-30 NOTE — PROGRESS NOTES
Bariatric Midtrial   Returns during WLT to discuss status. Having several issues. She still wants a bypass. When enrolled with [facility], she was instructed to lose to 400 lbs to consider for surgery. Past Medical History:   Diagnosis Date    HTN (hypertension)     Morbid obesity with BMI of 70 and over, adult ([address]      No past surgical history on file. Allergies   Allergen Reactions    Phentermine Angioedema     Current Outpatient Medications   Medication Sig Dispense Refill    metFORMIN (GLUCOPHAGE) 500 mg tablet 500 mg.      omeprazole (PRILOSEC) 20 mg capsule 20 mg.      hydroCHLOROthiazide (HYDRODIURIL) 25 mg tablet 25 mg.      verapamil ER (VERELAN PM) 100 mg capsule Take 1 Cap by mouth nightly.  27 Cap 11     Social History     Socioeconomic History    Marital status: SINGLE     Spouse name: Not on file    Number of children: Not on file    Years of education: Not on file    Highest education level: Not on file   Social Needs    Financial resource strain: Not on file    Food insecurity - worry: Not on file    Food insecurity - inability: Not on file    Transportation needs - medical: Not on file   [place] needs - non-medical: Not on file   Occupational History    Not on file   Tobacco Use    Smoking status: Never Smoker    Smokeless tobacco: Never Used   Substance and Sexual Activity    Alcohol use: Not on file    Drug use: No    Sexual activity: No   Other Topics Concern    Not on file   Social History Narrative    Not on file     Family History   Problem Relation Age of Onset    Hypertension Mother     No Known Problems Father      Family Status   Relation Name Status    Mother  Alive    Father  Alive       No change in ROS     Visit Vitals  BP (!) 130/94 (BP 1 Location: Left arm, BP Patient Position: At rest)   Pulse 87   Temp 98.4 °F (36.9 °C) (Oral)   Resp 20   Ht 5' 2\" (1.575 m)   Wt (!) 209.1 kg (461 lb)   BMI 84.32 kg/m²       Pulm: CTA   CV: RRR   Abd: soft, nontender, non palp costal margin and  no hernias     Labs: low Vit D   Psych- still pending  Nutr - She has actually gained 13 lbs from her initial weight    Imp:   Very poor progress with WLT    She has 3 months to lose weight.  If unable, she will  Be disenrolled for 6 months

## 2018-12-11 ENCOUNTER — OFFICE VISIT (OUTPATIENT)
Dept: FAMILY MEDICINE CLINIC | Age: 28
End: 2018-12-11

## 2018-12-11 VITALS
DIASTOLIC BLOOD PRESSURE: 68 MMHG | WEIGHT: 293 LBS | SYSTOLIC BLOOD PRESSURE: 85 MMHG | OXYGEN SATURATION: 99 % | HEIGHT: 62 IN | BODY MASS INDEX: 53.92 KG/M2 | TEMPERATURE: 97.8 F | RESPIRATION RATE: 16 BRPM | HEART RATE: 96 BPM

## 2018-12-11 DIAGNOSIS — E28.2 POLYCYSTIC OVARIAN SYNDROME: ICD-10-CM

## 2018-12-11 DIAGNOSIS — E55.9 VITAMIN D DEFICIENCY: ICD-10-CM

## 2018-12-11 DIAGNOSIS — R63.2 BINGE EATING: ICD-10-CM

## 2018-12-11 DIAGNOSIS — I10 ESSENTIAL HYPERTENSION: ICD-10-CM

## 2018-12-11 DIAGNOSIS — Z00.00 ANNUAL PHYSICAL EXAM: ICD-10-CM

## 2018-12-11 DIAGNOSIS — Z00.00 MEDICARE ANNUAL WELLNESS VISIT, SUBSEQUENT: ICD-10-CM

## 2018-12-11 DIAGNOSIS — E66.01 MORBID OBESITY WITH BMI OF 70 AND OVER, ADULT (HCC): Primary | ICD-10-CM

## 2018-12-11 DIAGNOSIS — E61.1 IRON DEFICIENCY: ICD-10-CM

## 2018-12-11 RX ORDER — PEN NEEDLE, DIABETIC 30 GX3/16"
NEEDLE, DISPOSABLE MISCELLANEOUS
Qty: 30 PEN NEEDLE | Refills: 3 | Status: SHIPPED | OUTPATIENT
Start: 2018-12-11 | End: 2019-01-10 | Stop reason: SDUPTHER

## 2018-12-11 RX ORDER — FERROUS SULFATE 325(65) MG
325 TABLET, DELAYED RELEASE (ENTERIC COATED) ORAL
Qty: 30 TAB | Refills: 2 | Status: SHIPPED | OUTPATIENT
Start: 2018-12-11 | End: 2019-10-30 | Stop reason: SDUPTHER

## 2018-12-11 RX ORDER — ERGOCALCIFEROL 1.25 MG/1
50000 CAPSULE ORAL
Qty: 4 CAP | Refills: 11 | Status: SHIPPED | OUTPATIENT
Start: 2018-12-11 | End: 2019-10-30 | Stop reason: SDUPTHER

## 2018-12-11 NOTE — PROGRESS NOTES
Harperet Gr is a 33 y.o.  female and presents with  (AWV) The Initial Medicare Annual Wellness Exam PROGRESS NOTE    This is an Initial Medicare Annual Wellness Exam (AWV)    I have reviewed the patient's medical history in detail and updated the computerized patient record. Harpreet Gr is a 33 y.o.  female and presents for an annual wellness exam       Patient Active Problem List   Diagnosis Code    Essential hypertension I10    Prediabetes R73.03    Morbid obesity with BMI of 70 and over, adult ([facility] E66.01, Z68.45    Advance care planning Z71.89       ROS   General ROS: negative for - chills or fever  Psychological ROS: negative for - anxiety or depression  Ophthalmic ROS: positive for - uses glasses  ENT ROS: negative for - headaches, nasal congestion or sore throat  Endocrine ROS: negative for - polydipsia/polyuria or temperature intolerance  Respiratory ROS: no cough, shortness of breath, or wheezing  Cardiovascular ROS: no chest pain or dyspnea on exertion  Gastrointestinal ROS: no abdominal pain, change in bowel habits, or black or bloody stools  Genito-Urinary ROS: no dysuria, trouble voiding, or hematuria  Musculoskeletal ROS: negative for - joint pain or muscle pain  Neurological ROS: negative for - numbness/tingling or weakness  Dermatological ROS: negative for - rash or skin lesion changes    All other systems reviewed and are negative. History     Past Medical History:   Diagnosis Date    HTN (hypertension)     Morbid obesity with BMI of 70 and over, adult ([facility]       No past surgical history on file. Current Outpatient Medications   Medication Sig Dispense Refill    liraglutide (VICTOZA) 0.6 mg/0.1 mL (18 mg/3 mL) pnij 0.6 mg by SubCUTAneous route daily.  1 Pen 3    Insulin Needles, Disposable, 31 gauge x 5/16\" ndle Use needle with pen once a day 30 Pen Needle 3    ergocalciferol (ERGOCALCIFEROL) 50,000 unit capsule Take 1 Cap by mouth every seven (7) days. 4 Cap 11    ferrous sulfate (IRON) 325 mg (65 mg iron) EC tablet Take 1 Tab by mouth Daily (before breakfast). 30 Tab 2    omeprazole (PRILOSEC) 20 mg capsule 20 mg.      verapamil ER (VERELAN PM) 100 mg capsule Take 1 Cap by mouth nightly. 30 Cap 11     Allergies   Allergen Reactions    Phentermine Angioedema     Family History   Problem Relation Age of Onset    Hypertension Mother     No Known Problems Father      Social History     Tobacco Use    Smoking status: Never Smoker    Smokeless tobacco: Never Used   Substance Use Topics    Alcohol use: Not on file     Patient Active Problem List   Diagnosis Code    Essential hypertension I10    Prediabetes R73.03    Morbid obesity with BMI of 70 and over, adult ([facility] E66.01, Z68.45    Advance care planning Z71.89       Health Maintenance History  Immunizations reviewed, dtap due  , pneumovax n/a, flu up to date, zoster n/a  Colonoscopy: n/a,   Eye exam: up to date      Depression Risk Factor Screening:      Patient Health Questionnaire (PHQ-2)   Over the last 2 weeks, how often have you been bothered by any of the following problems? · Little interest or pleasure in doing things? · Not at all. [0]  · Feeling down, depressed, or hopeless? · Not at all. [0]    Total Score: 0/6  PHQ-2 Assessment Scoring:   A score of 2 or more requires further screening with the PHQ-9    Alcohol Risk Factor Screening:     Women: On any occasion during the past 3 months, have you had more than 3 drinks containing alcohol? no   Do you average more than 7 drinks per week? no      Functional Ability and Level of Safety:     Hearing Loss    Hearing is good. Activities of Daily Living   Self-care.    Requires assistance with: no ADLs    Fall Risk   No fall risk factors    Abuse Screen   Patient is not abused    Examination   Physical Examination  Vitals:    12/11/18 1337   BP: (!) 85/68   Pulse: 96   Resp: 16   Temp: 97.8 °F (36.6 °C)   TempSrc: Oral SpO2: 99%   Weight: (!) 466 lb (211.4 kg)   Height: 5' 2\" (1.575 m)   PainSc:   0 - No pain      Body mass index is 85.23 kg/m². Evaluation of Cognitive Function:  Mood/affect:good mood with appropriate affect  Appearance:well kempt  Family member/caregiver input: n/a    alert, well appearing, and in no distress, oriented to person, place, and time and morbidly obese    Patient Care Team:  Annika Nunez MD as PCP - General (Family Practice)  Lucia Goldberg MD (General Surgery)    End-of-life planning  Advanced Directive in the case than an injury or illness causes the patient to be unable to make health care decisions    Health Care Directive or Living Will: no    Advice/Referrals/Counselling/Plan:   Education and counseling provided:  End-of-Life planning (with patient's consent)  Influenza Vaccine  Medical nutrition therapy for individuals with diabetes or renal disease  Include in education list (weight loss, physical activity, smoking cessation, fall prevention, and nutrition)    ICD-10-CM ICD-9-CM    1. Morbid obesity with BMI of 70 and over, adult ([facility] E66.01 278.01 liraglutide (VICTOZA) 0.6 mg/0.1 mL (18 mg/3 mL) pnij    Z68.45 V85.45 Insulin Needles, Disposable, 31 gauge x 5/16\" ndle   2. Binge eating R63.2 783.6    3. Polycystic ovarian syndrome E28.2 256.4    4. Essential hypertension I10 401.9    5. Annual physical exam Z00.00 V70.0    6. Vitamin D deficiency E55.9 268.9 ergocalciferol (ERGOCALCIFEROL) 50,000 unit capsule   7. Iron deficiency E61.1 280.9 ferrous sulfate (IRON) 325 mg (65 mg iron) EC tablet   8. Medicare annual wellness visit, subsequent Z00.00 V70.0 [address] [place] [address] [employer]   . Brief written plan, checklist    I have discussed the diagnosis with the patient and the intended plan as seen in the above orders. The patient has received an after-visit summary and questions were answered concerning future plans.   I have discussed medication side effects and warnings with the patient as well. I have reviewed the plan of care with the patient, accepted their input and they are in agreement with the treatment goals. Follow-up Disposition:  Return in about 1 month (around 1/11/2019) for weight loss. ____________________________________________________________    Problem Assessment    for treatment of   Chief Complaint   Patient presents with    Complete Physical         SUBJECTIVE    Well Adult Physical   Patient here for a comprehensive physical exam.The patient reports problems - morbid obesity, hyperglycemia  Do you take any herbs or supplements that were not prescribed by a doctor? no Are you taking calcium supplements? no Are you taking aspirin daily? not iapplicable    Visit Vitals  BP (!) 85/68 (BP 1 Location: Right arm, BP Patient Position: Sitting)   Pulse 96   Temp 97.8 °F (36.6 °C) (Oral)   Resp 16   Ht 5' 2\" (1.575 m)   Wt (!) 466 lb (211.4 kg)   SpO2 99%   BMI 85.23 kg/m²     General:  Alert, cooperative, no distress, appears stated age. Head:  Normocephalic, without obvious abnormality, atraumatic. Eyes:  Conjunctivae/corneas clear. PERRL, EOMs intact. Ears:  Normal TMs and external ear canals both ears. Nose: Nares normal. Septum midline. Mucosa normal. No drainage or sinus tenderness. Throat: Lips, mucosa, and tongue normal. Teeth and gums normal.   Neck: Supple, symmetrical, trachea midline, no adenopathy, thyroid: no enlargement/tenderness/nodules   Back:   Symmetric, no curvature. ROM normal.    Lungs:   Clear to auscultation bilaterally. Chest wall:  No tenderness or deformity. Heart:  Regular rate and rhythm, S1, S2 normal, no murmur, click, rub or gallop. Breast Exam:  Not examined   Abdomen:   Soft, non-tender. Bowel sounds normal. No masses,  No organomegaly. Genitalia:  deferred   Rectal:  deferred. Extremities: Extremities normal, atraumatic, no cyanosis or edema. Pulses: 2+ and symmetric all extremities.    Skin: Skin color, texture, turgor normal. No rashes or lesions. Lymph nodes: Cervical, supraclavicular, and axillary nodes normal.   Neurologic: CNII-XII intact. Normal strength, sensation and reflexes throughout. Assessment/Plan:    1. Morbid obesity with BMI of 70 and over, adult ([address]  Start medication to assist with weight loss; pt has been evaluated for weight loss surgery but currently surgery is limiting factor  - liraglutide (VICTOZA) 0.6 mg/0.1 mL (18 mg/3 mL) pnij; 0.6 mg by SubCUTAneous route daily. Dispense: 1 Pen; Refill: 3  - Insulin Needles, Disposable, 31 gauge x 5/16\" ndle; Use needle with pen once a day  Dispense: 30 Pen Needle; Refill: 3    2. Binge eating  Continue wellbutrin    3. Polycystic ovarian syndrome  Continue metformin    4. Essential hypertension  Goal <130/80; at goal    5. Annual physical exam  Reviewed preventive recommendations    6. Vitamin D deficiency    - ergocalciferol (ERGOCALCIFEROL) 50,000 unit capsule; Take 1 Cap by mouth every seven (7) days. Dispense: 4 Cap; Refill: 11    7. Iron deficiency  Start iron supplement  - ferrous sulfate (IRON) 325 mg (65 mg iron) EC tablet; Take 1 Tab by mouth Daily (before breakfast). Dispense: 30 Tab; Refill: 2    8.  Medicare annual wellness visit, subsequent  Reviewed preventive recommendations  - [id number] [employer]    Lab review: orders written for new lab studies as appropriate; see orders

## 2018-12-11 NOTE — PATIENT INSTRUCTIONS
Well Visit, Ages 25 to 48: Care Instructions  Your Care Instructions    Physical exams can help you stay healthy. Your doctor has checked your overall health and may have suggested ways to take good care of yourself. He or she also may have recommended tests. At home, you can help prevent illness with healthy eating, regular exercise, and other steps. Follow-up care is a key part of your treatment and safety. Be sure to make and go to all appointments, and call your doctor if you are having problems. It's also a good idea to know your test results and keep a list of the medicines you take. How can you care for yourself at home? · Reach and stay at a healthy weight. This will lower your risk for many problems, such as obesity, diabetes, heart disease, and high blood pressure. · Get at least 30 minutes of physical activity on most days of the week. Walking is a good choice. You also may want to do other activities, such as running, swimming, cycling, or playing tennis or team sports. Discuss any changes in your exercise program with your doctor. · Do not smoke or allow others to smoke around you. If you need help quitting, talk to your doctor about stop-smoking programs and medicines. These can increase your chances of quitting for good. · Talk to your doctor about whether you have any risk factors for sexually transmitted infections (STIs). Having one sex partner (who does not have STIs and does not have sex with anyone else) is a good way to avoid these infections. · Use birth control if you do not want to have children at this time. Talk with your doctor about the choices available and what might be best for you. · Protect your skin from too much sun. When you're outdoors from 10 a.m. to 4 p.m., stay in the shade or cover up with clothing and a hat with a wide brim. Wear sunglasses that block UV rays. Even when it's cloudy, put broad-spectrum sunscreen (SPF 30 or higher) on any exposed skin.   · See a dentist one or two times a year for checkups and to have your teeth cleaned. · Wear a seat belt in the car. · Drink alcohol in moderation, if at all. That means no more than 2 drinks a day for men and 1 drink a day for women. Follow your doctor's advice about when to have certain tests. These tests can spot problems early. For everyone  · Cholesterol. Have the fat (cholesterol) in your blood tested after age 21. Your doctor will tell you how often to have this done based on your age, family history, or other things that can increase your risk for heart disease. · Blood pressure. Have your blood pressure checked during a routine doctor visit. Your doctor will tell you how often to check your blood pressure based on your age, your blood pressure results, and other factors. · Vision. Talk with your doctor about how often to have a glaucoma test.  · Diabetes. Ask your doctor whether you should have tests for diabetes. · Colon cancer. Have a test for colon cancer at age 48. You may have one of several tests. If you are younger than 48, you may need a test earlier if you have any risk factors. Risk factors include whether you already had a precancerous polyp removed from your colon or whether your parent, brother, sister, or child has had colon cancer. For women  · Breast exam and mammogram. Talk to your doctor about when you should have a clinical breast exam and a mammogram. Medical experts differ on whether and how often women under 50 should have these tests. Your doctor can help you decide what is right for you. · Pap test and pelvic exam. Begin Pap tests at age 24. A Pap test is the best way to find cervical cancer. The test often is part of a pelvic exam. Ask how often to have this test.  · Tests for sexually transmitted infections (STIs). Ask whether you should have tests for STIs. You may be at risk if you have sex with more than one person, especially if your partners do not wear condoms.   For men  · Tests for sexually transmitted infections (STIs). Ask whether you should have tests for STIs. You may be at risk if you have sex with more than one person, especially if you do not wear a condom. · Testicular cancer exam. Ask your doctor whether you should check your testicles regularly. · Prostate exam. Talk to your doctor about whether you should have a blood test (called a PSA test) for prostate cancer. Experts differ on whether and when men should have this test. Some experts suggest it if you are older than 39 and are -American or have a father or brother who got prostate cancer when he was younger than 72. When should you call for help? Watch closely for changes in your health, and be sure to contact your doctor if you have any problems or symptoms that concern you. Where can you learn more? Go to http://chaya-eris.info/. Enter P072 in the search box to learn more about \"Well Visit, Ages 25 to 48: Care Instructions. \"  Current as of: March 29, 2018  Content Version: 11.8  © 9726-5747 [employer]. Care instructions adapted under license by [employer] (which disclaims liability or warranty for this information). If you have questions about a medical condition or this instruction, always ask your healthcare professional. [address] any warranty or liability for your use of this information. Learning About Vitamin D  Why is it important to get enough vitamin D? Your body needs vitamin D to absorb calcium. Calcium keeps your bones and muscles, including your heart, healthy and strong. If your muscles don't get enough calcium, they can cramp, hurt, or feel weak. You may have long-term (chronic) muscle aches and pains. If you don't get enough vitamin D throughout life, you have an increased chance of having thin and brittle bones (osteoporosis) in your later years.  Children who don't get enough vitamin D may not grow as much as others their age. They also have a chance of getting a rare disease called rickets. It causes weak bones. Vitamin D and calcium are added to many foods. And your body uses sunshine to make its own vitamin D. How much vitamin D do you need? The [place] [employer] recommends that people ages 3 through 79 get 600 IU (international units) every day. Adults 71 and older need 800 IU every day. Blood tests for vitamin D can check your vitamin D level. But there is no standard normal range used by all laboratories. You're likely getting enough vitamin D if your levels are in the range of 20 to 50 ng/mL. How can you get more vitamin D? Foods that contain vitamin D include:  · [place], tuna, and mackerel. These are some of the best foods to eat when you need to get more vitamin D.  · Cheese, egg yolks, and beef liver. These foods have vitamin D in small amounts. · Milk, soy drinks, orange juice, yogurt, margarine, and some kinds of cereal have vitamin D added to them. Some people don't make vitamin D as well as others. They may have to take extra care in getting enough vitamin D. Things that reduce how much vitamin D your body makes include:  · Dark skin, such as many  Americans have. · Age, especially if you are older than 72. · Digestive problems, such as Crohn's or celiac disease. · Liver and kidney disease. Some people who do not get enough vitamin D may need supplements. Are there any risks from taking vitamin D?  · Too much vitamin D:  ? Can damage your kidneys. ? Can cause nausea and vomiting, constipation, and weakness. ? Raises the amount of calcium in your blood. If this happens, you can get confused or have an irregular heart rhythm. · Vitamin D may interact with other medicines. Tell your doctor about all of the medicines you take, including over-the-counter drugs, herbs, and pills. Tell your doctor about all of your current medical problems. Where can you learn more?   Go to http://doris.info/. Enter [id number] in the search box to learn more about \"Learning About Vitamin D.\"  Current as of: March 29, 2018  Content Version: 11.8  © 6546-9280 [employer]. Care instructions adapted under license by [employer] (which disclaims liability or warranty for this information). If you have questions about a medical condition or this instruction, always ask your healthcare professional. [address] any warranty or liability for your use of this information.

## 2018-12-11 NOTE — PROGRESS NOTES
Chief Complaint   Patient presents with    Complete Physical     1. Have you been to the ER, urgent care clinic since your last visit? Hospitalized since your last visit? No    2. Have you seen or consulted any other health care providers outside of the [address] [place] [address] since your last visit? Include any pap smears or colon screening.  No

## 2018-12-17 ENCOUNTER — TELEPHONE (OUTPATIENT)
Dept: FAMILY MEDICINE CLINIC | Age: 28
End: 2018-12-17

## 2018-12-17 DIAGNOSIS — E66.01 MORBID OBESITY WITH BMI OF 70 AND OVER, ADULT (HCC): ICD-10-CM

## 2018-12-17 RX ORDER — PEN NEEDLE, DIABETIC 30 GX3/16"
NEEDLE, DISPOSABLE MISCELLANEOUS
Qty: 30 PEN NEEDLE | Refills: 3 | Status: CANCELLED | OUTPATIENT
Start: 2018-12-17

## 2018-12-17 NOTE — TELEPHONE ENCOUNTER
Patient called, she was in 12/11/18. Dr. Debra Mckee sent over medication that day to [facility]. [facility] is saying they never received anything. Please advise, thank you.

## 2018-12-17 NOTE — TELEPHONE ENCOUNTER
[address] and was advised that the insulin needles, disposable, 31 gauge x 5/16 were not received and verbal order could not be given. New prescription was requested by pharmacist. Please advise.  Thank you

## 2018-12-26 NOTE — ACP (ADVANCE CARE PLANNING)
Advance Care Planning (ACP) Provider Note - Comprehensive     Date of ACP Conversation: 12/11/18  Persons included in Conversation:  patient  Length of ACP Conversation in minutes:  <16 minutes (Non-Billable)    Authorized Decision Maker (if patient is incapable of making informed decisions): This person is:  none          General ACP for ALL Patients with Decision Making Capacity:   Importance of advance care planning, including choosing a healthcare agent to communicate patient's healthcare decisions if patient lost the ability to make decisions, such as after a sudden illness or accident  Understanding of the healthcare agent role was assessed and information provided  Exploration of values, goals, and preferences if recovery is not expected, even with continued medical treatment in the event of: Imminent death  Severe, permanent brain injury  \"In these circumstances, what matters most to you? \"  Care focused more on comfort or quality of life. \"What, if any, treatments would you want to avoid? \" none  Opportunity offered to explore how cultural, Sikh, spiritual, or personal beliefs would affect decisions for future care     Review of Existing Advance Directive:  What information were you given about medical decisions to consider before completing your advance directive? none    For Serious or Chronic Illness:  Understanding of medical condition      Interventions Provided:  Recommended completion of Advance Directive form after review of ACP materials and conversation with prospective healthcare agent

## 2019-01-10 ENCOUNTER — OFFICE VISIT (OUTPATIENT)
Dept: FAMILY MEDICINE CLINIC | Age: 29
End: 2019-01-10

## 2019-01-10 VITALS
RESPIRATION RATE: 24 BRPM | WEIGHT: 293 LBS | OXYGEN SATURATION: 94 % | HEART RATE: 84 BPM | SYSTOLIC BLOOD PRESSURE: 146 MMHG | TEMPERATURE: 98 F | DIASTOLIC BLOOD PRESSURE: 85 MMHG | BODY MASS INDEX: 53.92 KG/M2 | HEIGHT: 62 IN

## 2019-01-10 DIAGNOSIS — E66.01 MORBID OBESITY WITH BMI OF 70 AND OVER, ADULT (HCC): ICD-10-CM

## 2019-01-10 RX ORDER — PEN NEEDLE, DIABETIC 30 GX3/16"
NEEDLE, DISPOSABLE MISCELLANEOUS
Qty: 30 PEN NEEDLE | Refills: 3 | Status: SHIPPED | OUTPATIENT
Start: 2019-01-10 | End: 2019-05-06 | Stop reason: ALTCHOICE

## 2019-01-10 NOTE — PROGRESS NOTES
Chief Complaint   Patient presents with    Weight Management     1. Have you been to the ER, urgent care clinic since your last visit? Hospitalized since your last visit? No    2. Have you seen or consulted any other health care providers outside of the [address] [place] [address] since your last visit? Include any pap smears or colon screening.  No

## 2019-01-10 NOTE — PROGRESS NOTES
Lillian Velasco is a 33 y.o.  female and presents with    Chief Complaint   Patient presents with    Weight Management           Subjective:  Ms. Dunne Artist returns for weight management. She was prescribed liraglutide at her last appointment but never filled this prescription. She has increased her weight. She is awaiting further evaluation by bariatric surgeon. Patient Active Problem List   Diagnosis Code    Essential hypertension I10    Prediabetes R73.03    Morbid obesity with BMI of 70 and over, adult ([facility] [address] E66.01, Z68.45    Advance care planning Z71.89     Patient Active Problem List    Diagnosis Date Noted    Prediabetes 02/21/2017    Morbid obesity with BMI of 70 and over, adult ([facility] [address] 02/21/2017    Advance care planning 02/21/2017    Essential hypertension 02/07/2017     Current Outpatient Medications   Medication Sig Dispense Refill    Insulin Needles, Disposable, 31 gauge x 5/16\" ndle Use needle with pen once a day 30 Pen Needle 3    liraglutide (VICTOZA) 0.6 mg/0.1 mL (18 mg/3 mL) pnij 0.6 mg by SubCUTAneous route daily. 1 Pen 3    ergocalciferol (ERGOCALCIFEROL) 50,000 unit capsule Take 1 Cap by mouth every seven (7) days. 4 Cap 11    ferrous sulfate (IRON) 325 mg (65 mg iron) EC tablet Take 1 Tab by mouth Daily (before breakfast). 30 Tab 2    omeprazole (PRILOSEC) 20 mg capsule 20 mg.      verapamil ER (VERELAN PM) 100 mg capsule Take 1 Cap by mouth nightly. 30 Cap 11     Allergies   Allergen Reactions    Phentermine Angioedema     Past Medical History:   Diagnosis Date    HTN (hypertension)     Morbid obesity with BMI of 70 and over, adult [facility])      History reviewed. No pertinent surgical history.   Family History   Problem Relation Age of Onset    Hypertension Mother     No Known Problems Father      Social History     Tobacco Use    Smoking status: Never Smoker    Smokeless tobacco: Never Used   Substance Use Topics    Alcohol use: Not on file       ROS General ROS: negative for - chills or fever; + weight gain  Psychological ROS: negative for - anxiety or depression  Ophthalmic ROS: positive for - uses glasses  ENT ROS: negative for - headaches, nasal congestion or sore throat  Endocrine ROS: negative for - polydipsia/polyuria or temperature intolerance  Respiratory ROS: no cough, shortness of breath, or wheezing  Cardiovascular ROS: no chest pain or dyspnea on exertion  Gastrointestinal ROS: no abdominal pain, change in bowel habits, or black or bloody stools  Genito-Urinary ROS: no dysuria, trouble voiding, or hematuria  Musculoskeletal ROS: negative for - joint pain or muscle pain  Neurological ROS: negative for - numbness/tingling or weakness  Dermatological ROS: negative for - rash or skin lesion changes        All other systems reviewed and are negative. Objective:  Vitals:    01/10/19 1034   BP: 146/85   Pulse: 84   Resp: 24   Temp: 98 °F (36.7 °C)   TempSrc: Oral   SpO2: 94%   Weight: (!) 473 lb (214.6 kg)   Height: 5' 2\" (1.575 m)   PainSc:   0 - No pain     General:  Alert and Responsive and in no acute distress. HEENT: Conjunctiva pink, sclera anicteric.  PERRL.  EOMI.  Pharynx moist, nonerythematous.  Moist mucous membranes.  Thyroid not enlarged, no nodules.  No cervical, supraclavicular, occipital or submandibular lymphadenopathy.  No other gross abnormalities present. CV:  RRR, no murmurs, rubs, or gallops.  PMI not displaced. No visible pulsations or thrills.  No carotid bruits. RESP:  Unlabored breathing.  Lungs clear to auscultation. no wheeze, rales, or rhonchi.  Equal expansion bilaterally.      Assessment/Plan:    1. Morbid obesity with BMI of 70 and over, adult [facility])  Encourage use of medication  - liraglutide (VICTOZA) 0.6 mg/0.1 mL (18 mg/3 mL) pnij; 0.6 mg by SubCUTAneous route daily.   Dispense: 1 Pen; Refill: 3  - Insulin Needles, Disposable, 31 gauge x 5/16\" ndle; Use needle with pen once a day  Dispense: 30 Pen Needle; Refill: 3      Lab review: no lab studies available for review at time of visit      I have discussed the diagnosis with the patient and the intended plan as seen in the above orders. The patient has received an after-visit summary and questions were answered concerning future plans. I have discussed medication side effects and warnings with the patient as well. I have reviewed the plan of care with the patient, accepted their input and they are in agreement with the treatment goals. Follow-up Disposition:  Return in about 3 months (around 4/10/2019) for weight management.

## 2019-04-22 ENCOUNTER — TELEPHONE (OUTPATIENT)
Dept: FAMILY MEDICINE CLINIC | Age: 29
End: 2019-04-22

## 2019-04-22 NOTE — TELEPHONE ENCOUNTER
Patient called requesting a letter stating she can receive her SSI. She feels she is able and capable of handling her own SSI. Social security is requiring this prior to allowing patient to receive her own SSI funds.

## 2019-04-30 NOTE — TELEPHONE ENCOUNTER
Patient did not answer. Voicemail box is set up. Left detailed VM. Advised patient to keep her upcoming appointment May 6th. SSI will be discussed at this appointment.

## 2019-05-06 ENCOUNTER — OFFICE VISIT (OUTPATIENT)
Dept: FAMILY MEDICINE CLINIC | Age: 29
End: 2019-05-06

## 2019-05-06 VITALS
WEIGHT: 293 LBS | BODY MASS INDEX: 53.92 KG/M2 | TEMPERATURE: 98 F | DIASTOLIC BLOOD PRESSURE: 91 MMHG | SYSTOLIC BLOOD PRESSURE: 137 MMHG | RESPIRATION RATE: 17 BRPM | HEIGHT: 62 IN | HEART RATE: 82 BPM | OXYGEN SATURATION: 95 %

## 2019-05-06 DIAGNOSIS — E66.01 MORBID OBESITY WITH BMI OF 70 AND OVER, ADULT (HCC): ICD-10-CM

## 2019-05-06 DIAGNOSIS — I10 ESSENTIAL HYPERTENSION: Primary | ICD-10-CM

## 2019-05-06 NOTE — LETTER
NOTIFICATION  
 
5/6/2019 4:47 PM 
 
Ms. Francisco City [address] [place] [id number] To Whom It May Concern: 
 
Francisco City is currently under the care of [address] [place] [address]. She is capable of receiving her social security check and maintaining a budget independently. If there are questions or concerns please have the patient contact our office. Sincerely, Phil Osgood, MD

## 2019-05-06 NOTE — PROGRESS NOTES
Alba Hollins is a 33 y.o.  female and presents with Chief Complaint Patient presents with  Letter for School/Work Subjective: 
Ms. Onel Calix presents for evaluation and for letter. She is working as an  at [employer].  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 She is seeking control of her payment and asking for a letter indicating that she is capable of handling her money. ROS General ROS: negative for - chills or fever; + weight gain Psychological ROS: negative for - anxiety or depression Ophthalmic ROS: positive for - uses glasses ENT ROS: negative for - headaches, nasal congestion or sore throat Endocrine ROS: negative for - polydipsia/polyuria or temperature intolerance Respiratory ROS: no cough, shortness of breath, or wheezing Cardiovascular ROS: no chest pain or dyspnea on exertion Gastrointestinal ROS: no abdominal pain, change in bowel habits, or black or bloody stools Genito-Urinary ROS: no dysuria, trouble voiding, or hematuria Musculoskeletal ROS: negative for - joint pain or muscle pain Neurological ROS: negative for - numbness/tingling or weakness Dermatological ROS: negative for - rash or skin lesion changes All other systems reviewed and are negative. Objective: 
Vitals:  
 05/06/19 1626 BP: (!) 137/91 Pulse: 82 Resp: 17 Temp: 98 °F (36.7 °C) TempSrc: Oral  
SpO2: 95% Weight: (!) 422 lb (191.4 kg) Height: 5' 2\" (1.575 m) PainSc:   0 - No pain LMP: 04/29/2019 Physical Exam  
Constitutional: She is oriented to person, place, and time and well-developed, well-nourished, and in no distress. HENT:  
Head: Normocephalic. Cardiovascular: Normal rate and regular rhythm. Exam reveals no gallop and no friction rub. No murmur heard. Pulmonary/Chest: Breath sounds normal. She has no wheezes. She has no rales. Neurological: She is alert and oriented to person, place, and time. No cranial nerve deficit. Gait normal. Coordination normal.  
Skin: No rash noted. No erythema. Assessment/Plan: 1. Essential hypertension Goal <130/80 2. Morbid obesity with BMI of 70 and over, adult ([facility] [address] I have reviewed/discussed the above normal BMI with the patient. I have recommended the following interventions: dietary management education, guidance, and counseling and encourage exercise . continue assistance with weight loss Lab review: no lab studies available for review at time of visit I have discussed the diagnosis with the patient and the intended plan as seen in the above orders. The patient has received an after-visit summary and questions were answered concerning future plans. I have discussed medication side effects and warnings with the patient as well. I have reviewed the plan of care with the patient, accepted their input and they are in agreement with the treatment goals.

## 2019-06-06 ENCOUNTER — OFFICE VISIT (OUTPATIENT)
Dept: FAMILY MEDICINE CLINIC | Age: 29
End: 2019-06-06

## 2019-06-06 VITALS
RESPIRATION RATE: 17 BRPM | DIASTOLIC BLOOD PRESSURE: 83 MMHG | TEMPERATURE: 96.9 F | SYSTOLIC BLOOD PRESSURE: 145 MMHG | WEIGHT: 293 LBS | BODY MASS INDEX: 53.92 KG/M2 | OXYGEN SATURATION: 98 % | HEART RATE: 112 BPM | HEIGHT: 62 IN

## 2019-06-06 DIAGNOSIS — R73.01 IMPAIRED FASTING GLUCOSE: ICD-10-CM

## 2019-06-06 DIAGNOSIS — E66.01 MORBID OBESITY WITH BMI OF 70 AND OVER, ADULT (HCC): ICD-10-CM

## 2019-06-06 DIAGNOSIS — F43.21 GRIEVING: Primary | ICD-10-CM

## 2019-06-06 RX ORDER — PEN NEEDLE, DIABETIC 30 GX3/16"
NEEDLE, DISPOSABLE MISCELLANEOUS
Qty: 30 PEN NEEDLE | Refills: 3 | Status: SHIPPED | OUTPATIENT
Start: 2019-06-06 | End: 2019-09-10 | Stop reason: SDUPTHER

## 2019-06-06 NOTE — PROGRESS NOTES
Kavitha Salinas is a 30 y.o female that is present in the office for a routine appointment for medication evaluation. 1. Have you been to the ER, urgent care clinic since your last visit? Hospitalized since your last visit? No    2. Have you seen or consulted any other health care providers outside of the [address] [place] [address] since your last visit? Include any pap smears or colon screening.  No     Health Maintenance Due   Topic Date Due    DTaP/Tdap/Td series (1 - Tdap) 06/05/2012    PAP AKA CERVICAL CYTOLOGY  06/05/2012

## 2019-06-06 NOTE — PATIENT INSTRUCTIONS
Grief (Actual/Anticipated): Care Instructions Your Care Instructions Grief is your emotional reaction to a major loss. The words \"sorrow\" and \"heartache\" often are used to describe feelings of grief. You feel grief when you lose a beloved person, pet, place, or thing. It is also natural to feel grief when you lose a valued way of life, such as a job, marriage, or good health. You may begin to grieve before a loss occurs. You may grieve for a loved one who is sick and dying. Children and adults often feel the pain of loss before a big move or divorce. This type of grief helps you get ready for a loss. Grief is different for each person. There is no \"normal\" or \"expected\" period of time for grieving. Some people adjust to their loss within a couple of months. Others may take 2 years or longer, especially if their lives were changed a lot or if the loss was sudden and shocking. Grieving can cause problems such as headaches, loss of appetite, and trouble with thinking or sleeping. You may withdraw from friends and family and behave in ways that are unusual for you. Grief may cause you to question your beliefs and views about life. Grief is natural and does not require medical treatment. But if you have trouble sleeping, it may help to take sleeping pills for a short time. It may help to talk with people who have been through or are going through similar losses. You may also want to talk to a counselor about your feelings. Talking about your loss, sharing your cares and concerns, and getting support from others are important parts of healthy grieving. Follow-up care is a key part of your treatment and safety. Be sure to make and go to all appointments, and call your doctor if you are having problems. It's also a good idea to know your test results and keep a list of the medicines you take. How can you care for yourself at home? · Get enough sleep.  Your mind helps make sense of your life while you sleep. Missing sleep can lead to illness and make it harder for you to deal with your grief. · Eat healthy foods. Try to avoid eating only foods that give you comfort. Ask someone to join you for a meal if you do not like eating alone. Consider taking a multivitamin every day. · Get some exercise every day. Even a walk can help you deal with your grief. Other exercises, such as yoga, can also help you manage stress. · Comfort yourself. Take time to look at photos or use special items that make you feel better. · Stay involved in your life. Do not withdraw from the activities you enjoy. People you know at work, [employer], clubs, or other groups can help you get through your period of grief. · Think about joining a support group to help you deal with your grief. There are many support groups to help people recover from grief. When should you call for help? Call 911 anytime you think you may need emergency care. For example, call if: 
  · You feel you cannot stop from hurting yourself or someone else.  
 Watch closely for changes in your health, and be sure to contact your doctor if: 
  · You think you may be depressed.  
  · You do not get better as expected. Where can you learn more? Go to http://chaya-eris.info/. Enter H249 in the search box to learn more about \"Grief (Actual/Anticipated): Care Instructions. \" Current as of: April 18, 2018 Content Version: 11.9 © 3487-7244 [employer]. Care instructions adapted under license by [employer] (which disclaims liability or warranty for this information). If you have questions about a medical condition or this instruction, always ask your healthcare professional. [address] any warranty or liability for your use of this information.

## 2019-06-07 ENCOUNTER — TELEPHONE (OUTPATIENT)
Dept: FAMILY MEDICINE CLINIC | Age: 29
End: 2019-06-07

## 2019-06-16 NOTE — PROGRESS NOTES
Shirlene Chow is a 30 y.o.  female and presents with    Chief Complaint   Patient presents with    Weight Management       Subjective:  Ms Sarah Jasso returns for weight management. She is mourning her mother's death. She has siblings and family supporting her in her grief. She has been tearful and has had difficulty sleeping. She was prescribed liraglutide at her last appointment but never filled this prescription. She has lost minimal weight. She is awaiting further evaluation by bariatric surgeon. ROS   General ROS: negative for - chills or fever; + weight gain  Ophthalmic ROS: positive for - uses glasses  ENT ROS: negative for - headaches, nasal congestion or sore throat  Endocrine ROS: negative for - polydipsia/polyuria or temperature intolerance  Respiratory ROS: no cough, shortness of breath, or wheezing  Cardiovascular ROS: no chest pain or dyspnea on exertion  Gastrointestinal ROS: no abdominal pain, change in bowel habits, or black or bloody stools  Genito-Urinary ROS: no dysuria, trouble voiding, or hematuria  Musculoskeletal ROS: negative for - joint pain or muscle pain  Neurological ROS: negative for - numbness/tingling or weakness  Dermatological ROS: negative for - rash or skin lesion changes     All other systems reviewed and are negative    Objective:  Vitals:    06/06/19 1436   BP: 145/83   Pulse: (!) 112   Resp: 17   Temp: 96.9 °F (36.1 °C)   TempSrc: Oral   SpO2: 98%   Weight: (!) 472 lb 9.6 oz (214.4 kg)   Height: 5' 2\" (1.575 m)   LMP: 04/30/2019     General:  Alert and Responsive and in no acute distress. HEENT: Conjunctiva pink, sclera anicteric.  PERRL.  EOMI.  Pharynx moist, nonerythematous.  Moist mucous membranes.  Thyroid not enlarged, no nodules.  No cervical, supraclavicular, occipital or submandibular lymphadenopathy.  No other gross abnormalities present. CV:  RRR, no murmurs, rubs, or gallops.  PMI not displaced. No visible pulsations or thrills.  No carotid bruits. RESP:  Unlabored breathing.  Lungs clear to auscultation. no wheeze, rales, or rhonchi.  Equal expansion bilaterally.      Assessment/Plan:    1. Grieving  support    2. Morbid obesity with BMI of 70 and over, adult ([facility] [address]    - Insulin Needles, Disposable, 31 gauge x 5/16\" ndle; Use needle with pen once a day  Dispense: 30 Pen Needle; Refill: 3  - liraglutide (VICTOZA) 0.6 mg/0.1 mL (18 mg/3 mL) pnij; 0.6 mg by SubCUTAneous route daily. Dispense: 1 Pen; Refill: 3    3. Impaired fasting glucose    - Insulin Needles, Disposable, 31 gauge x 5/16\" ndle; Use needle with pen once a day  Dispense: 30 Pen Needle; Refill: 3  - liraglutide (VICTOZA) 0.6 mg/0.1 mL (18 mg/3 mL) pnij; 0.6 mg by SubCUTAneous route daily. Dispense: 1 Pen; Refill: 3    Lab review: no lab studies available for review at time of visit      I have discussed the diagnosis with the patient and the intended plan as seen in the above orders. The patient has received an after-visit summary and questions were answered concerning future plans. I have discussed medication side effects and warnings with the patient as well. I have reviewed the plan of care with the patient, accepted their input and they are in agreement with the treatment goals. Follow-up and Dispositions    · Return in about 1 month (around 7/6/2019) for medication evaluation.

## 2019-07-09 ENCOUNTER — OFFICE VISIT (OUTPATIENT)
Dept: FAMILY MEDICINE CLINIC | Age: 29
End: 2019-07-09

## 2019-07-09 VITALS
SYSTOLIC BLOOD PRESSURE: 147 MMHG | OXYGEN SATURATION: 95 % | RESPIRATION RATE: 18 BRPM | HEART RATE: 104 BPM | BODY MASS INDEX: 53.92 KG/M2 | HEIGHT: 62 IN | WEIGHT: 293 LBS | TEMPERATURE: 97.2 F | DIASTOLIC BLOOD PRESSURE: 85 MMHG

## 2019-07-09 DIAGNOSIS — I10 ESSENTIAL HYPERTENSION: Primary | ICD-10-CM

## 2019-07-09 DIAGNOSIS — F43.21 GRIEVING: ICD-10-CM

## 2019-07-09 DIAGNOSIS — R73.01 IMPAIRED FASTING GLUCOSE: ICD-10-CM

## 2019-07-09 DIAGNOSIS — E66.01 MORBID OBESITY WITH BMI OF 70 AND OVER, ADULT (HCC): ICD-10-CM

## 2019-07-09 RX ORDER — LISINOPRIL 10 MG/1
10 TABLET ORAL DAILY
Qty: 30 TAB | Refills: 1 | Status: SHIPPED | OUTPATIENT
Start: 2019-07-09 | End: 2019-10-30 | Stop reason: SDUPTHER

## 2019-07-09 NOTE — PROGRESS NOTES
1. Have you been to the ER, urgent care clinic since your last visit? Hospitalized since your last visit? No    2. Have you seen or consulted any other health care providers outside of the [address] [place] [address] since your last visit? Include any pap smears or colon screening.  No

## 2019-07-09 NOTE — PROGRESS NOTES
Darron Goldstein is a 30 y.o.  female and presents with    Chief Complaint   Patient presents with    Medication Refill       Subjective:  Ms Matthew Barrett returns for weight management. She is mourning her mother's death. She has siblings and family supporting her in her grief. She has been tearful and has had difficulty sleeping. She was prescribed liraglutide at her last appointment but never filled this prescription.  She has lost minimal weight.    She is awaiting further evaluation by bariatric surgeon.       ROS   General ROS: negative for - chills or fever; + weight gain  Ophthalmic ROS: positive for - uses glasses  ENT ROS: negative for - headaches, nasal congestion or sore throat  Endocrine ROS: negative for - polydipsia/polyuria or temperature intolerance  Respiratory ROS: no cough, shortness of breath, or wheezing  Cardiovascular ROS: no chest pain or dyspnea on exertion  Gastrointestinal ROS: no abdominal pain, change in bowel habits, or black or bloody stools  Genito-Urinary ROS: no dysuria, trouble voiding, or hematuria  Musculoskeletal ROS: negative for - joint pain or muscle pain  Neurological ROS: negative for - numbness/tingling or weakness  Dermatological ROS: negative for - rash or skin lesion changes     All other systems reviewed and are negative          Objective:  Vitals:    07/09/19 1311   BP: 147/85   Pulse: (!) 104   Resp: 18   Temp: 97.2 °F (36.2 °C)   TempSrc: Oral   SpO2: 95%   Weight: (!) 485 lb 6.4 oz (220.2 kg)   Height: 5' 2\" (1.575 m)   PainSc:   0 - No pain   LMP: 06/19/2019     General:  Alert and Responsive and in no acute distress. Morbidly obese    CV:  RRR, no murmurs, rubs, or gallops.  PMI not displaced. No visible pulsations or thrills.  No carotid bruits. RESP:  Unlabored breathing.  Lungs clear to auscultation. no wheeze, rales, or rhonchi.  Equal expansion bilaterally.         Assessment/Plan:    1.  Morbid obesity with BMI of 70 and over, adult ([facility]  I have reviewed/discussed the above normal BMI with the patient. I have recommended the following interventions: dietary management education, guidance, and counseling and encourage exercise . Ca Ontiveros - liraglutide (VICTOZA) 0.6 mg/0.1 mL (18 mg/3 mL) pnij; 1.8 mg by SubCUTAneous route daily. Dispense: 1 Pen; Refill: 3    2. Impaired fasting glucose    - liraglutide (VICTOZA) 0.6 mg/0.1 mL (18 mg/3 mL) pnij; 1.8 mg by SubCUTAneous route daily. Dispense: 1 Pen; Refill: 3    3. Essential hypertension  Goal <130/80  - lisinopril (PRINIVIL, ZESTRIL) 10 mg tablet; Take 1 Tab by mouth daily. Dispense: 30 Tab; Refill: 1    4. Grieving  Improved but continues to become tearful when she remembers her mother      Lab review: no lab studies available for review at time of visit      I have discussed the diagnosis with the patient and the intended plan as seen in the above orders. The patient has received an after-visit summary and questions were answered concerning future plans. I have discussed medication side effects and warnings with the patient as well. I have reviewed the plan of care with the patient, accepted their input and they are in agreement with the treatment goals.

## 2019-09-10 ENCOUNTER — OFFICE VISIT (OUTPATIENT)
Dept: FAMILY MEDICINE CLINIC | Age: 29
End: 2019-09-10

## 2019-09-10 VITALS
HEART RATE: 103 BPM | SYSTOLIC BLOOD PRESSURE: 127 MMHG | RESPIRATION RATE: 22 BRPM | DIASTOLIC BLOOD PRESSURE: 77 MMHG | BODY MASS INDEX: 53.92 KG/M2 | TEMPERATURE: 97.8 F | OXYGEN SATURATION: 95 % | WEIGHT: 293 LBS | HEIGHT: 62 IN

## 2019-09-10 DIAGNOSIS — F43.21 GRIEVING: Primary | ICD-10-CM

## 2019-09-10 DIAGNOSIS — Z23 NEEDS FLU SHOT: ICD-10-CM

## 2019-09-10 DIAGNOSIS — F32.2 SEVERE MAJOR DEPRESSION (HCC): ICD-10-CM

## 2019-09-10 DIAGNOSIS — I10 ESSENTIAL HYPERTENSION: ICD-10-CM

## 2019-09-10 DIAGNOSIS — R73.01 IMPAIRED FASTING GLUCOSE: ICD-10-CM

## 2019-09-10 DIAGNOSIS — E66.01 MORBID OBESITY WITH BMI OF 70 AND OVER, ADULT (HCC): ICD-10-CM

## 2019-09-10 RX ORDER — CITALOPRAM 10 MG/1
10 TABLET ORAL DAILY
Qty: 30 TAB | Refills: 2 | Status: SHIPPED | OUTPATIENT
Start: 2019-09-10 | End: 2019-10-30 | Stop reason: SDUPTHER

## 2019-09-10 RX ORDER — PEN NEEDLE, DIABETIC 30 GX3/16"
NEEDLE, DISPOSABLE MISCELLANEOUS
Qty: 30 PEN NEEDLE | Refills: 3 | Status: SHIPPED | OUTPATIENT
Start: 2019-09-10 | End: 2021-09-07 | Stop reason: SDUPTHER

## 2019-09-10 NOTE — PROGRESS NOTES
Aime Scott is a 30 y.o.  female and presents with    Chief Complaint   Patient presents with    Depression     Subjective:  Yina Jalloh returns for evaluation of depression; she continues to mourn her mother's death. She has had suicidal thoughts. She has been to counseling. She has been writing about her mother. She denies plan for suicide. She has lost minimal weight.    She is awaiting further evaluation by bariatric surgeon.      ROS   General ROS: negative for - chills or fever; + weight gain  Ophthalmic ROS: positive for - uses glasses  ENT ROS: negative for - headaches, nasal congestion or sore throat  Endocrine ROS: negative for - polydipsia/polyuria or temperature intolerance  Respiratory ROS: no cough, shortness of breath, or wheezing  Cardiovascular ROS: no chest pain or dyspnea on exertion  Gastrointestinal ROS: no abdominal pain, change in bowel habits, or black or bloody stools  Genito-Urinary ROS: no dysuria, trouble voiding, or hematuria  Musculoskeletal ROS: negative for - joint pain or muscle pain  Neurological ROS: negative for - numbness/tingling or weakness  Dermatological ROS: negative for - rash or skin lesion changes    All other systems reviewed and are negative. Objective:  Vitals:    09/10/19 1311   BP: 127/77   Pulse: (!) 103   Resp: 22   Temp: 97.8 °F (36.6 °C)   TempSrc: Oral   SpO2: 95%   Weight: (!) 481 lb 6.4 oz (218.4 kg)   Height: 5' 2\" (1.575 m)   PainSc:   0 - No pain     General:  Alert and Responsive and in no acute distress. Morbidly obese  Psych: tearful   CV:  RRR, no murmurs, rubs, or gallops.  PMI not displaced. No visible pulsations or thrills.  No carotid bruits. RESP:  Unlabored breathing.  Lungs clear to auscultation. no wheeze, rales, or rhonchi.  Equal expansion bilaterally.         Assessment/Plan:    1.  Morbid obesity with BMI of 70 and over, adult ([facility] [address]    - liraglutide (VICTOZA) 0.6 mg/0.1 mL (18 mg/3 mL) pnij; 1.8 mg by SubCUTAneous route daily. Dispense: 1 Pen; Refill: 3  - Insulin Needles, Disposable, 31 gauge x 5/16\" ndle; Use needle with pen once a day  Dispense: 30 Pen Needle; Refill: 3    2. Impaired fasting glucose    - liraglutide (VICTOZA) 0.6 mg/0.1 mL (18 mg/3 mL) pnij; 1.8 mg by SubCUTAneous route daily. Dispense: 1 Pen; Refill: 3  - Insulin Needles, Disposable, 31 gauge x 5/16\" ndle; Use needle with pen once a day  Dispense: 30 Pen Needle; Refill: 3    3. Grieving  Start ssri for depression due to duration of mourning associated with her mother's death and the suicidal ideation  - citalopram (CELEXA) 10 mg tablet; Take 1 Tab by mouth daily. Dispense: 30 Tab; Refill: 2    4. Essential hypertension  Goal <130/80    5. Severe major depression ([facility] [address]  Continue counseling    6. Needs flu shot    - INFLUENZA VIRUS VAC QUAD,SPLIT,PRESV FREE SYRINGE IM  - ADMIN INFLUENZA VIRUS VAC    Lab review: no lab studies available for review at time of visit      I have discussed the diagnosis with the patient and the intended plan as seen in the above orders. The patient has received an after-visit summary and questions were answered concerning future plans. I have discussed medication side effects and warnings with the patient as well. I have reviewed the plan of care with the patient, accepted their input and they are in agreement with the treatment goals.

## 2019-09-10 NOTE — PROGRESS NOTES
Chief Complaint   Patient presents with    Depression     1. Have you been to the ER, urgent care clinic since your last visit? Hospitalized since your last visit? No    2. Have you seen or consulted any other health care providers outside of the [address] [place] [address] since your last visit? Include any pap smears or colon screening.  No

## 2019-09-28 NOTE — TELEPHONE ENCOUNTER
I have canceled the appointment for tomorrow. Explained the wlt process. Made appointments with Arthur Fierro and will mail out information.
no

## 2019-10-30 ENCOUNTER — OFFICE VISIT (OUTPATIENT)
Dept: FAMILY MEDICINE CLINIC | Age: 29
End: 2019-10-30

## 2019-10-30 VITALS
SYSTOLIC BLOOD PRESSURE: 140 MMHG | WEIGHT: 293 LBS | HEART RATE: 99 BPM | RESPIRATION RATE: 16 BRPM | OXYGEN SATURATION: 98 % | TEMPERATURE: 97.5 F | HEIGHT: 62 IN | BODY MASS INDEX: 53.92 KG/M2 | DIASTOLIC BLOOD PRESSURE: 90 MMHG

## 2019-10-30 DIAGNOSIS — R73.01 IMPAIRED FASTING GLUCOSE: ICD-10-CM

## 2019-10-30 DIAGNOSIS — E61.1 IRON DEFICIENCY: ICD-10-CM

## 2019-10-30 DIAGNOSIS — E55.9 VITAMIN D DEFICIENCY: ICD-10-CM

## 2019-10-30 DIAGNOSIS — I10 ESSENTIAL HYPERTENSION: ICD-10-CM

## 2019-10-30 DIAGNOSIS — F43.21 GRIEVING: ICD-10-CM

## 2019-10-30 DIAGNOSIS — E28.2 POLYCYSTIC OVARIAN SYNDROME: Primary | ICD-10-CM

## 2019-10-30 RX ORDER — NORGESTIMATE AND ETHINYL ESTRADIOL 7DAYSX3 LO
1 KIT ORAL DAILY
Qty: 84 TAB | Refills: 1 | Status: SHIPPED | OUTPATIENT
Start: 2019-10-30 | End: 2020-04-23 | Stop reason: SDUPTHER

## 2019-10-30 RX ORDER — CITALOPRAM 10 MG/1
10 TABLET ORAL DAILY
Qty: 30 TAB | Refills: 2 | Status: SHIPPED | OUTPATIENT
Start: 2019-10-30 | End: 2019-12-12 | Stop reason: DRUGHIGH

## 2019-10-30 RX ORDER — LISINOPRIL 10 MG/1
10 TABLET ORAL DAILY
Qty: 30 TAB | Refills: 1 | Status: SHIPPED | OUTPATIENT
Start: 2019-10-30 | End: 2019-12-12 | Stop reason: ALTCHOICE

## 2019-10-30 RX ORDER — ERGOCALCIFEROL 1.25 MG/1
50000 CAPSULE ORAL
Qty: 4 CAP | Refills: 11 | Status: SHIPPED | OUTPATIENT
Start: 2019-10-30

## 2019-10-30 RX ORDER — OMEPRAZOLE 20 MG/1
20 CAPSULE, DELAYED RELEASE ORAL
Status: CANCELLED | OUTPATIENT
Start: 2019-10-30

## 2019-10-30 RX ORDER — FERROUS SULFATE 325(65) MG
325 TABLET, DELAYED RELEASE (ENTERIC COATED) ORAL
Qty: 30 TAB | Refills: 2 | Status: SHIPPED | OUTPATIENT
Start: 2019-10-30

## 2019-10-30 NOTE — PROGRESS NOTES
Deon Kothari is a 30 y.o.  female and presents with    Chief Complaint   Patient presents with    Medication Evaluation     Subjective:  Ms. Kelsey Rodriguez returns for medication evaluation; she has been going to counseling to deal with her mother's passing; she reports improvement; she has met a man who is showing interest in her and this has her feeling better. She denies plan for suicide. She has lost minimal weight.    She is awaiting further evaluation by bariatric surgeon.       ROS   General ROS: negative for - chills or fever; + weight gain  Ophthalmic ROS: positive for - uses glasses  ENT ROS: negative for - headaches, nasal congestion or sore throat  Endocrine ROS: negative for - polydipsia/polyuria or temperature intolerance  Respiratory ROS: no cough, shortness of breath, or wheezing  Cardiovascular ROS: no chest pain or dyspnea on exertion  Gastrointestinal ROS: no abdominal pain, change in bowel habits, or black or bloody stools  Genito-Urinary ROS: no dysuria, trouble voiding, or hematuria  Musculoskeletal ROS: negative for - joint pain or muscle pain  Neurological ROS: negative for - numbness/tingling or weakness  Dermatological ROS: negative for - rash or skin lesion changes     All other systems reviewed and are negative      Objective:  Vitals:    10/30/19 1359   BP: 140/90   Pulse: 99   Resp: 16   Temp: 97.5 °F (36.4 °C)   TempSrc: Oral   SpO2: 98%   Weight: (!) 481 lb (218.2 kg)   Height: 5' 2\" (1.575 m)   PainSc:   0 - No pain       General:  Alert and Responsive and in no acute distress.  Morbidly obese  Psych: good mood with appropriate affect  CV:  RRR, no murmurs, rubs, or gallops.  PMI not displaced. No visible pulsations or thrills.  No carotid bruits. RESP:  Unlabored breathing.  Lungs clear to auscultation. no wheeze, rales, or rhonchi.  Equal expansion bilaterally.      Assessment/Plan:    1. Grieving  Mood improved  - citalopram (CELEXA) 10 mg tablet;  Take 1 Tab by mouth daily. Dispense: 30 Tab; Refill: 2    2. Essential hypertension  Goal <130/80  - lisinopril (PRINIVIL, ZESTRIL) 10 mg tablet; Take 1 Tab by mouth daily. Dispense: 30 Tab; Refill: 1    3. Vitamin D deficiency    - ergocalciferol (ERGOCALCIFEROL) 50,000 unit capsule; Take 1 Cap by mouth every seven (7) days. Dispense: 4 Cap; Refill: 11    4. Iron deficiency    - ferrous sulfate (IRON) 325 mg (65 mg iron) EC tablet; Take 1 Tab by mouth Daily (before breakfast). Dispense: 30 Tab; Refill: 2    5. Polycystic ovarian syndrome  Start OCP  - norgestimate-ethinyl estradiol (ORTHO TRI-CYCLEN LO) 0.18/0.215/0.25 mg-25 mcg tab; Take 1 Tab by mouth daily. Dispense: 84 Tab; Refill: 1    6. Impaired fasting glucose  Encourage low carb diet      Lab review: no lab studies available for review at time of visit      I have discussed the diagnosis with the patient and the intended plan as seen in the above orders. The patient has received an after-visit summary and questions were answered concerning future plans. I have discussed medication side effects and warnings with the patient as well. I have reviewed the plan of care with the patient, accepted their input and they are in agreement with the treatment goals.

## 2019-10-30 NOTE — PROGRESS NOTES
Coleen Apple a 30 y.o female that is present in the office for a routine appointment for medication evaluation. 151/10    1. Have you been to the ER, urgent care clinic since your last visit? Hospitalized since your last visit? No    2. Have you seen or consulted any other health care providers outside of the [address] [place] [address] since your last visit? Include any pap smears or colon screening.  No      Health Maintenance Due   Topic Date Due    DTaP/Tdap/Td series (1 - Tdap) 06/05/2012    PAP AKA CERVICAL CYTOLOGY  06/05/2012

## 2019-12-12 ENCOUNTER — OFFICE VISIT (OUTPATIENT)
Dept: FAMILY MEDICINE CLINIC | Age: 29
End: 2019-12-12

## 2019-12-12 DIAGNOSIS — E28.2 POLYCYSTIC OVARIAN SYNDROME: ICD-10-CM

## 2019-12-12 DIAGNOSIS — Z71.89 ADVANCE CARE PLANNING: ICD-10-CM

## 2019-12-12 DIAGNOSIS — F43.21 GRIEVING: ICD-10-CM

## 2019-12-12 DIAGNOSIS — E66.01 MORBID OBESITY WITH BMI OF 70 AND OVER, ADULT (HCC): ICD-10-CM

## 2019-12-12 DIAGNOSIS — R73.01 IMPAIRED FASTING GLUCOSE: ICD-10-CM

## 2019-12-12 DIAGNOSIS — Z00.00 MEDICARE ANNUAL WELLNESS VISIT, SUBSEQUENT: Primary | ICD-10-CM

## 2019-12-12 DIAGNOSIS — F32.2 SEVERE MAJOR DEPRESSION (HCC): ICD-10-CM

## 2019-12-12 DIAGNOSIS — I10 ESSENTIAL HYPERTENSION: ICD-10-CM

## 2019-12-12 RX ORDER — LISINOPRIL AND HYDROCHLOROTHIAZIDE 20; 25 MG/1; MG/1
1 TABLET ORAL DAILY
Qty: 90 TAB | Refills: 3 | Status: SHIPPED | OUTPATIENT
Start: 2019-12-12 | End: 2020-04-30 | Stop reason: SDUPTHER

## 2019-12-12 RX ORDER — CITALOPRAM 20 MG/1
20 TABLET, FILM COATED ORAL DAILY
Qty: 30 TAB | Refills: 11 | Status: SHIPPED | OUTPATIENT
Start: 2019-12-12 | End: 2020-04-30 | Stop reason: SDUPTHER

## 2019-12-12 NOTE — ACP (ADVANCE CARE PLANNING)
Advance Care Planning (ACP) Provider Note - Comprehensive      Date of ACP Conversation: 12/12/2019  Persons included in Conversation:  patient  Length of ACP Conversation in minutes:  16 minutes      Authorized Decision Maker (if patient is incapable of making informed decisions): This person is:  Her sister, Mini Vaca for ALL Patients with Decision Making Capacity:   Importance of advance care planning, including choosing a healthcare agent to communicate patient's healthcare decisions if patient lost the ability to make decisions, such as after a sudden illness or accident  Understanding of the healthcare agent role was assessed and information provided  Exploration of values, goals, and preferences if recovery is not expected, even with continued medical treatment in the event of: Imminent death  Severe, permanent brain injury  \"In these circumstances, what matters most to you? \"  Care focused more on comfort or quality of life. \"What, if any, treatments would you want to avoid? \" none  Opportunity offered to explore how cultural, Worship, spiritual, or personal beliefs would affect decisions for future care      Review of Existing Advance Directive:  What information were you given about medical decisions to consider before completing your advance directive? none     For Serious or Chronic Illness:  Understanding of medical condition       Interventions Provided:  Recommended completion of Advance Directive form after review of ACP materials and conversation with prospective healthcare agent

## 2019-12-12 NOTE — ASSESSMENT & PLAN NOTE
Well Controlled, based on history, physical exam and review of pertinent labs, studies and medications; meds reconciled; lifestyle modifications recommended. Key Antihyperglycemic Medications             liraglutide (VICTOZA) 0.6 mg/0.1 mL (18 mg/3 mL) pnij 1.8 mg by SubCUTAneous route daily.         No results found for: HBA1C, [id number], GLU, CREA, CREAPOC, CREATEXT, MALBUR, MCALPOCT, MCACRPOC, MCACR, MCAU, MCAU2, MALBEXT, CHOL, CHOLPOCT, HDL, HDLPOC, LDLC, LDL, LDLCEXT, LDLCPOC, TRIGL, TGLPOCT, [id number]

## 2019-12-12 NOTE — PATIENT INSTRUCTIONS
Well Visit, Ages 25 to 48: Care Instructions Your Care Instructions Physical exams can help you stay healthy. Your doctor has checked your overall health and may have suggested ways to take good care of yourself. He or she also may have recommended tests. At home, you can help prevent illness with healthy eating, regular exercise, and other steps. Follow-up care is a key part of your treatment and safety. Be sure to make and go to all appointments, and call your doctor if you are having problems. It's also a good idea to know your test results and keep a list of the medicines you take. How can you care for yourself at home? · Reach and stay at a healthy weight. This will lower your risk for many problems, such as obesity, diabetes, heart disease, and high blood pressure. · Get at least 30 minutes of physical activity on most days of the week. Walking is a good choice. You also may want to do other activities, such as running, swimming, cycling, or playing tennis or team sports. Discuss any changes in your exercise program with your doctor. · Do not smoke or allow others to smoke around you. If you need help quitting, talk to your doctor about stop-smoking programs and medicines. These can increase your chances of quitting for good. · Talk to your doctor about whether you have any risk factors for sexually transmitted infections (STIs). Having one sex partner (who does not have STIs and does not have sex with anyone else) is a good way to avoid these infections. · Use birth control if you do not want to have children at this time. Talk with your doctor about the choices available and what might be best for you. · Protect your skin from too much sun. When you're outdoors from 10 a.m. to 4 p.m., stay in the shade or cover up with clothing and a hat with a wide brim. Wear sunglasses that block UV rays. Even when it's cloudy, put broad-spectrum sunscreen (SPF 30 or higher) on any exposed skin. · See a dentist one or two times a year for checkups and to have your teeth cleaned. · Wear a seat belt in the car. Follow your doctor's advice about when to have certain tests. These tests can spot problems early. For everyone · Cholesterol. Have the fat (cholesterol) in your blood tested after age 21. Your doctor will tell you how often to have this done based on your age, family history, or other things that can increase your risk for heart disease. · Blood pressure. Have your blood pressure checked during a routine doctor visit. Your doctor will tell you how often to check your blood pressure based on your age, your blood pressure results, and other factors. · Vision. Talk with your doctor about how often to have a glaucoma test. 
· Diabetes. Ask your doctor whether you should have tests for diabetes. · Colon cancer. Your risk for colorectal cancer gets higher as you get older. Some experts say that adults should start regular screening at age 48 and stop at age 76. Others say to start before age 48 or continue after age 76. Talk with your doctor about your risk and when to start and stop screening. For women · Breast exam and mammogram. Talk to your doctor about when you should have a clinical breast exam and a mammogram. Medical experts differ on whether and how often women under 50 should have these tests. Your doctor can help you decide what is right for you. · Cervical cancer screening test and pelvic exam. Begin with a Pap test at age 24. The test often is part of a pelvic exam. Starting at age 27, you may choose to have a Pap test, an HPV test, or both tests at the same time (called co-testing). Talk with your doctor about how often to have testing. · Tests for sexually transmitted infections (STIs). Ask whether you should have tests for STIs. You may be at risk if you have sex with more than one person, especially if your partners do not wear condoms. For men · Tests for sexually transmitted infections (STIs). Ask whether you should have tests for STIs. You may be at risk if you have sex with more than one person, especially if you do not wear a condom. · Testicular cancer exam. Ask your doctor whether you should check your testicles regularly. · Prostate exam. Talk to your doctor about whether you should have a blood test (called a PSA test) for prostate cancer. Experts differ on whether and when men should have this test. Some experts suggest it if you are older than 39 and are -American or have a father or brother who got prostate cancer when he was younger than 72. When should you call for help? Watch closely for changes in your health, and be sure to contact your doctor if you have any problems or symptoms that concern you. Where can you learn more? Go to http://chaya-eris.info/. Enter P072 in the search box to learn more about \"Well Visit, Ages 25 to 48: Care Instructions. \" Current as of: December 13, 2018 Content Version: 12.2 © 3710-5408 [employer]. Care instructions adapted under license by [employer] (which disclaims liability or warranty for this information). If you have questions about a medical condition or this instruction, always ask your healthcare professional. [address] any warranty or liability for your use of this information.

## 2019-12-12 NOTE — PROGRESS NOTES
(AWV) The Medicare Annual Wellness Exam PROGRESS NOTE    This is a Medicare Annual Wellness Exam (AWV)  I have reviewed the patient's medical history in detail and updated the computerized patient record. Steve Partida is a 30 y.o.  female and presents for an annual wellness exam     ROS   General ROS: negative for - chills or fever; + weight gain  Ophthalmic ROS: positive for - uses glasses  ENT ROS: negative for - headaches, nasal congestion or sore throat  Endocrine ROS: negative for - polydipsia/polyuria or temperature intolerance  Respiratory ROS: no cough, shortness of breath, or wheezing  Cardiovascular ROS: no chest pain or dyspnea on exertion  Gastrointestinal ROS: no abdominal pain, change in bowel habits, or black or bloody stools  Genito-Urinary ROS: no dysuria, trouble voiding, or hematuria  Musculoskeletal ROS: negative for - joint pain or muscle pain  Neurological ROS: negative for - numbness/tingling or weakness  Dermatological ROS: negative for - rash or skin lesion changes    All other systems reviewed and are negative. History     Past Medical History:   Diagnosis Date    HTN (hypertension)     Morbid obesity with BMI of 70 and over, adult [facility])       History reviewed. No pertinent surgical history. Current Outpatient Medications   Medication Sig Dispense Refill    citalopram (CELEXA) 10 mg tablet Take 1 Tab by mouth daily. 30 Tab 2    lisinopril (PRINIVIL, ZESTRIL) 10 mg tablet Take 1 Tab by mouth daily. 30 Tab 1    ergocalciferol (ERGOCALCIFEROL) 50,000 unit capsule Take 1 Cap by mouth every seven (7) days. 4 Cap 11    ferrous sulfate (IRON) 325 mg (65 mg iron) EC tablet Take 1 Tab by mouth Daily (before breakfast). 30 Tab 2    norgestimate-ethinyl estradiol (ORTHO TRI-CYCLEN LO) 0.18/0.215/0.25 mg-25 mcg tab Take 1 Tab by mouth daily. 84 Tab 1    liraglutide (VICTOZA) 0.6 mg/0.1 mL (18 mg/3 mL) pnij 1.8 mg by SubCUTAneous route daily.  1 Pen 3    Insulin Needles, Disposable, 31 gauge x 5/16\" ndle Use needle with pen once a day 30 Pen Needle 3    omeprazole (PRILOSEC) 20 mg capsule 20 mg. Allergies   Allergen Reactions    Phentermine Angioedema     Family History   Problem Relation Age of Onset    Hypertension Mother     No Known Problems Father      Social History     Tobacco Use    Smoking status: Never Smoker    Smokeless tobacco: Never Used   Substance Use Topics    Alcohol use: Not on file     Patient Active Problem List   Diagnosis Code    Essential hypertension I10    Prediabetes R73.03    Morbid obesity with BMI of 70 and over, adult ([facility] [address] E66.01, Z68.45    Advance care planning Z71.89       Health Maintenance History  Immunizations reviewed, dtap due , pneumovax n/a, flu up to date, zoster n/a  Colonoscopy: n/a,   Eye exam: up to date    Depression Risk Factor Screening:      Pt is depressed    Alcohol Risk Factor Screening:     Women: On any occasion during the past 3 months, have you had more than 3 drinks containing alcohol? no   Do you average more than 7 drinks per week? no    Functional Ability and Level of Safety:     Hearing Loss    Hearing is good. Activities of Daily Living   Self-care. Requires assistance with: no ADLs    Fall Risk   No fall risk factors    Abuse Screen   Patient is not abused    Examination   Physical Examination  Vitals:    12/12/19 1406   BP: (!) 162/94   Pulse: 100   Resp: 16   Temp: 97 °F (36.1 °C)   TempSrc: Oral   SpO2: 100%   Weight: (!) 484 lb (219.5 kg)   Height: 5' 2\" (1.575 m)   PainSc:   7   PainLoc: Back   LMP: 08/12/2019      Body mass index is 88.52 kg/m².      Evaluation of Cognitive Function:  Mood/affect:depressed mood  Appearance:well kempt  Family member/caregiver input:n/a    alert, well appearing, and in no distress, oriented to person, place, and time and morbidly obese    Patient Care Team:  Trice Lutz MD as PCP - General (Family Practice)  Trice Lutz MD as PCP - [facility] Provider  Ene Hammond MD (General Surgery)    End-of-life planning  Advanced Directive in the case than an injury or illness causes the patient to be unable to make health care decisions    Health Care Directive or Living Will: no    Advice/Referrals/Counselling/Plan:   Education and counseling provided:  End-of-Life planning (with patient's consent)  Diabetes screening test  Include in education list (weight loss, physical activity, smoking cessation, fall prevention, and nutrition)    ICD-10-CM ICD-9-CM    1. Medicare annual wellness visit, subsequent Z00.00 V70.0    2. Advance care planning Z71.89 V65.49 ADVANCE CARE PLANNING FIRST 30 MINS   3. Essential hypertension I10 401.9 lisinopril-hydroCHLOROthiazide (PRINZIDE, ZESTORETIC) 20-25 mg per tablet   4. Grieving F43.21 309.0 citalopram (CELEXA) 20 mg tablet   5. Polycystic ovarian syndrome E28.2 256.4    6. Impaired fasting glucose R73.01 790.21    7. Morbid obesity with BMI of 70 and over, [facility] E66.01 278.01     Z68.45 V85.45    8. Severe major depression (HCC) F32.2 296.23 citalopram (CELEXA) 20 mg tablet   . Brief written plan, checklist    I have discussed the diagnosis with the patient and the intended plan as seen in the above orders. The patient has received an after-visit summary and questions were answered concerning future plans. I have discussed medication side effects and warnings with the patient as well. I have reviewed the plan of care with the patient, accepted their input and they are in agreement with the treatment goals. ____________________________________________________________    Problem Assessment    for treatment of   Chief Complaint   Patient presents with    Annual Wellness Visit         SUBJECTIVE    Well Adult Physical     Ms. Rebekah Talley is here for a comprehensive physical exam.The patient reports problems - morbid obesity and depression  Do you take any herbs or supplements that were not prescribed by a doctor? no Are you taking calcium supplements? no Are you taking aspirin daily? not applicable  she returns for medication evaluation; she has been going to counseling to deal with her mother's passing; she reports improvement; she has met a man who is showing interest in her and this has her feeling better. She denies plan for suicide. She has lost minimal weight.    She is awaiting further evaluation by bariatric surgeon.         Visit Vitals  /88 (BP 1 Location: Left arm, BP Patient Position: Sitting)   Pulse 100   Temp 97 °F (36.1 °C) (Oral)   Resp 16   Ht 5' 2\" (1.575 m)   Wt (!) 484 lb (219.5 kg)   LMP 08/12/2019 (Approximate)   SpO2 100%   BMI 88.52 kg/m²     General:  Alert, cooperative, no distress, appears stated age. Head:  Normocephalic, without obvious abnormality, atraumatic. Eyes:  Conjunctivae/corneas clear. PERRL, EOMs intact. Fundi benign. Ears:  Normal TMs and external ear canals both ears. Nose: Nares normal. Septum midline. Mucosa normal. No drainage or sinus tenderness. Throat: Lips, mucosa, and tongue normal. Teeth and gums normal.   Neck: Supple, symmetrical, trachea midline, no adenopathy, thyroid: no enlargement/tenderness/nodules, no carotid bruit and no JVD. Back:   Symmetric, no curvature. ROM normal. No CVA tenderness. Lungs:   Clear to auscultation bilaterally. Chest wall:  No tenderness or deformity. Heart:  Regular rate and rhythm, S1, S2 normal, no murmur, click, rub or gallop. Breast Exam:  Not examined   Abdomen:   Soft, non-tender. Bowel sounds normal. No masses,  No organomegaly. Genitalia:  deferred   Rectal:  deferred   Extremities: Extremities normal, atraumatic, no cyanosis or edema. Pulses: 2+ and symmetric all extremities. Skin: Skin color, texture, turgor normal. No rashes or lesions. Lymph nodes: Cervical, supraclavicular, and axillary nodes normal.   Neurologic: CNII-XII intact. Normal strength, sensation and reflexes throughout. Assessment/Plan:    1. Advance care planning  See ACP  - ADVANCE CARE PLANNING FIRST 30 MINS    2. Essential hypertension  Goal <130/80; take medication as prescribed  - lisinopril-hydroCHLOROthiazide (PRINZIDE, ZESTORETIC) 20-25 mg per tablet; Take 1 Tab by mouth daily. Dispense: 90 Tab; Refill: 3    3. Medicare annual wellness visit, subsequent  Reviewed preventive recommendations    4. Grieving  Ssri continued; mood improved  - citalopram (CELEXA) 20 mg tablet; Take 1 Tab by mouth daily. Dispense: 30 Tab; Refill: 11    5. Polycystic ovarian syndrome  Continue glucose lowering treatment    6. Impaired fasting glucose      7. Morbid obesity with BMI of 70 and over, adult [facility])  F/u with bariatric surgeon    8. Severe major depression (HCC)    - citalopram (CELEXA) 20 mg tablet; Take 1 Tab by mouth daily. Dispense: 30 Tab;  Refill: 11    Lab review: no lab studies available for review at time of visit

## 2019-12-12 NOTE — PROGRESS NOTES
1. Have you been to the ER, urgent care clinic since your last visit? Hospitalized since your last visit? No    2. Have you seen or consulted any other health care providers outside of the [address] [place] [address] since your last visit? Include any pap smears or colon screening.  No     Patient presents in office today for annual medicare wellness exam

## 2019-12-23 VITALS
SYSTOLIC BLOOD PRESSURE: 140 MMHG | DIASTOLIC BLOOD PRESSURE: 88 MMHG | TEMPERATURE: 97 F | RESPIRATION RATE: 16 BRPM | HEART RATE: 100 BPM | WEIGHT: 293 LBS | OXYGEN SATURATION: 100 % | HEIGHT: 62 IN | BODY MASS INDEX: 53.92 KG/M2

## 2020-01-23 ENCOUNTER — OFFICE VISIT (OUTPATIENT)
Dept: FAMILY MEDICINE CLINIC | Age: 30
End: 2020-01-23

## 2020-01-23 VITALS
BODY MASS INDEX: 53.92 KG/M2 | TEMPERATURE: 96.3 F | DIASTOLIC BLOOD PRESSURE: 94 MMHG | OXYGEN SATURATION: 93 % | RESPIRATION RATE: 20 BRPM | SYSTOLIC BLOOD PRESSURE: 144 MMHG | WEIGHT: 293 LBS | HEART RATE: 101 BPM | HEIGHT: 62 IN

## 2020-01-23 DIAGNOSIS — E66.01 MORBID OBESITY WITH BMI OF 70 AND OVER, ADULT (HCC): ICD-10-CM

## 2020-01-23 DIAGNOSIS — I10 ESSENTIAL HYPERTENSION: Primary | ICD-10-CM

## 2020-01-23 DIAGNOSIS — R73.01 IMPAIRED FASTING GLUCOSE: ICD-10-CM

## 2020-01-23 DIAGNOSIS — F32.2 SEVERE MAJOR DEPRESSION (HCC): ICD-10-CM

## 2020-01-23 RX ORDER — VERAPAMIL HYDROCHLORIDE 100 MG/1
100 CAPSULE, EXTENDED RELEASE ORAL
Qty: 30 CAP | Refills: 2 | Status: SHIPPED | OUTPATIENT
Start: 2020-01-23 | End: 2020-04-23

## 2020-01-23 NOTE — PROGRESS NOTES
Jasmine Guzman is a 30 y.o.  female and presents with    Chief Complaint   Patient presents with    Depression    Hypertension     Subjective:  Hypertension  Patient is here for follow-up of hypertension. She indicates that she is feeling well and denies any symptoms referable to her hypertension. She is exercising and is adherent to low salt diet. Blood pressure is well controlled at home. Use of agents associated with hypertension: none. Depression Review:  Patient is seen for followup of depression. Treatment includes Celexa and no other therapies. Ongoing symptoms include depressed mood. She denies anhedonia, weight loss, insomnia, fatigue, difficulty concentrating, hopelessness, impaired memory and recurrent thoughts of death. She experiences the following side effects from the treatment: none. ROS   General ROS: negative for - chills or fever; + weight gain  Ophthalmic ROS: positive for - uses glasses  ENT ROS: negative for - headaches, nasal congestion or sore throat  Endocrine ROS: negative for - polydipsia/polyuria or temperature intolerance  Respiratory ROS: no cough, shortness of breath, or wheezing  Cardiovascular ROS: no chest pain or dyspnea on exertion  Gastrointestinal ROS: no abdominal pain, change in bowel habits, or black or bloody stools  Genito-Urinary ROS: no dysuria, trouble voiding, or hematuria  Musculoskeletal ROS: negative for - joint pain or muscle pain  Neurological ROS: negative for - numbness/tingling or weakness  Dermatological ROS: negative for - rash or skin lesion changes     All other systems reviewed and are negative.     Objective:  Vitals:    01/23/20 1546 01/23/20 1550   BP: 144/77 159/81   Pulse: (!) 101    Resp: 20    Temp: 96.3 °F (35.7 °C)    TempSrc: Oral    SpO2: 93%    Weight: (!) 476 lb 3.2 oz (216 kg)    Height: 5' 2\" (1.575 m)    PainSc:   0 - No pain   0 - No pain   LMP: 12/21/2019     General:  Alert and Responsive and in no acute distress.  Morbidly obese  Psych: good mood with appropriate affect  CV:  RRR, no murmurs, rubs, or gallops.  PMI not displaced. No visible pulsations or thrills.  No carotid bruits. RESP:  Unlabored breathing.  Lungs clear to auscultation. no wheeze, rales, or rhonchi.  Equal expansion bilaterally.      Assessment/Plan:    1. Morbid obesity with BMI of 70 and over, adult ([facility]  Start new weight loss medication  - liraglutide (VICTOZA) 0.6 mg/0.1 mL (18 mg/3 mL) pnij; 1.8 mg by SubCUTAneous route daily. Dispense: 1 Pen; Refill: 3    2. Impaired fasting glucose  Start glucose lowering treatment  - liraglutide (VICTOZA) 0.6 mg/0.1 mL (18 mg/3 mL) pnij; 1.8 mg by SubCUTAneous route daily. Dispense: 1 Pen; Refill: 3    3. Essential hypertension  Goal <130/80; not at goal; start CCB for better control  - verapamil ER (VERELAN PM) 100 mg capsule; Take 1 Cap by mouth nightly. Dispense: 30 Cap; Refill: 2    4. Severe major depression (HCC)  Mood improved; continue citalopram    Lab review: no lab studies available for review at time of visit      I have discussed the diagnosis with the patient and the intended plan as seen in the above orders. The patient has received an after-visit summary and questions were answered concerning future plans. I have discussed medication side effects and warnings with the patient as well. I have reviewed the plan of care with the patient, accepted their input and they are in agreement with the treatment goals.

## 2020-01-23 NOTE — PROGRESS NOTES
1. Have you been to the ER, urgent care clinic since your last visit? Hospitalized since your last visit? No    2. Have you seen or consulted any other health care providers outside of the [address] [place] [address] since your last visit? Include any pap smears or colon screening.  No

## 2020-04-23 ENCOUNTER — VIRTUAL VISIT (OUTPATIENT)
Dept: FAMILY MEDICINE CLINIC | Age: 30
End: 2020-04-23

## 2020-04-23 DIAGNOSIS — I10 ESSENTIAL HYPERTENSION: ICD-10-CM

## 2020-04-23 DIAGNOSIS — E66.01 MORBID OBESITY WITH BMI OF 70 AND OVER, ADULT (HCC): ICD-10-CM

## 2020-04-23 DIAGNOSIS — F33.42 DEPRESSION, MAJOR, RECURRENT, IN COMPLETE REMISSION (HCC): ICD-10-CM

## 2020-04-23 DIAGNOSIS — J06.9 VIRAL URI WITH COUGH: Primary | ICD-10-CM

## 2020-04-23 DIAGNOSIS — R73.01 IMPAIRED FASTING GLUCOSE: ICD-10-CM

## 2020-04-23 DIAGNOSIS — E28.2 POLYCYSTIC OVARIAN SYNDROME: ICD-10-CM

## 2020-04-23 RX ORDER — NORGESTIMATE AND ETHINYL ESTRADIOL 7DAYSX3 LO
1 KIT ORAL DAILY
Qty: 84 TAB | Refills: 3 | Status: SHIPPED | OUTPATIENT
Start: 2020-04-23 | End: 2021-09-07 | Stop reason: SDUPTHER

## 2020-04-23 RX ORDER — VERAPAMIL HYDROCHLORIDE 100 MG/1
100 CAPSULE, EXTENDED RELEASE ORAL
Qty: 90 CAP | Refills: 3 | Status: SHIPPED | OUTPATIENT
Start: 2020-04-23 | End: 2021-10-12 | Stop reason: SDUPTHER

## 2020-04-23 NOTE — PROGRESS NOTES
Rob Champion is a 30 y.o.  female and presents with    Chief Complaint   Patient presents with    Hypertension    Depression    Weight Management    Cold Symptoms     Rob Champion is a 30 y.o. female evaluated via doxy. me on 4/23/2020. Consent:  She and/or health care decision maker is aware that she may receive a bill for this audio/video service, depending on her insurance coverage, and has provided verbal consent to proceed: Yes  Pt is at her home, [facility] with this visit. Documentation:  I communicated with the patient and/or health care decision maker about lip pain. Details of this discussion including any medical advice provided: see pain      I affirm this is a Patient Initiated Episode with an Established Patient who has not had a related appointment within my department in the past 7 days or scheduled within the next 24 hours. Total Time: minutes: 5-10 minutes  This visit was conducted as a synchronous telemedicine service rendered via real-time interactive audio and video telecommunications system. On March 11, 2020, the [facility] declared the COVID-19 (Novel Coronavirus) viral disease to be a pandemic. As a result of this emergency, a rapidly evolving situation, practice patterns for physicians, physician assistants, and nurse practitioners are shifting to accommodate the need to treat in conjunction with unprecedented guidance from federal, state, and local authorities which include, but are not limited to, self-quarantines and/or limiting physical proximity to others under any number of circumstances. It is within this context (and with the understanding that this method of patient encounter is in the patient's best interest as well as the health and safety of other patients and the public) that Radha Aldridge is being provided for this patient encounter rather than a face-to-face visit.  This patient encounter is appropriate and reasonable under the circumstances given the patient's particular presentation at this time. Mariama Trejo MD         Subjective:  Upper Respiratory Infection  Patient complains of symptoms of a URI. Symptoms include coryza, sore throat, fever, cough and dry, peeling lips. Onset of symptoms was 4 days ago, gradually improving since that time. She also c/o headache described as pressure for the past 4 days . She is drinking moderate amounts of fluids. . Evaluation to date: none. Treatment to date: decongestants, carmex topical.    Depression Review:  She has depression, but her mood is greatly improved. Treatment includes Celexa and no other therapies. Ongoing symptoms include depressed mood. She denies anhedonia, weight loss, insomnia, fatigue, difficulty concentrating, hopelessness, impaired memory and recurrent thoughts of death. She experiences the following side effects from the treatment: none.        ROS   General ROS: negative for - chills or fever; + weight gain  Ophthalmic ROS: positive for - uses glasses  ENT ROS: negative for - headaches, nasal congestion or sore throat  Endocrine ROS: negative for - polydipsia/polyuria or temperature intolerance  Respiratory ROS: no cough, shortness of breath, or wheezing  Cardiovascular ROS: no chest pain or dyspnea on exertion  Gastrointestinal ROS: no abdominal pain, change in bowel habits, or black or bloody stools  Genito-Urinary ROS: no dysuria, trouble voiding, or hematuria  Musculoskeletal ROS: negative for - joint pain or muscle pain  Neurological ROS: negative for - numbness/tingling or weakness  Dermatological ROS: negative for - rash or skin lesion changes     All other systems reviewed and are negative.     Objective:  Vitals:    04/23/20 1326   PainSc:   0 - No pain   LMP: 03/21/2020       alert, well appearing, and in no distress, oriented to person, place, and time and morbidly obese  Mental status - normal mood, behavior, speech, dress, motor activity, and thought processes  Chest - normal work of breathing  Neurological - cranial nerves II through XII intact    Assessment/Plan:    1. Viral URI with cough  Use otc medications to treat symptoms    2. Morbid obesity with BMI of 70 and over, adult ([facility]  I have reviewed/discussed the above normal BMI with the patient. I have recommended the following interventions: dietary management education, guidance, and counseling and encourage exercise . Stan Hilt 3. Impaired fasting glucose  Continue GLP1    4. Essential hypertension  Goal <130/80    5. Depression, major, recurrent, in complete remission ([facility]        Lab review: no lab studies available for review at time of visit      I have discussed the diagnosis with the patient and the intended plan as seen in the above orders. I have discussed medication side effects and warnings with the patient as well. I have reviewed the plan of care with the patient, accepted their input and they are in agreement with the treatment goals.

## 2020-04-23 NOTE — PROGRESS NOTES
Elizabeth Bennett presents today for   Chief Complaint   Patient presents with    Hypertension    Depression    Weight Management       Is someone accompanying this pt? na    Is the patient using any DME equipment during OV? na    Depression Screening:  3 most recent PHQ Screens 1/23/2020   Little interest or pleasure in doing things Not at all   Feeling down, depressed, irritable, or hopeless Not at all   Total Score PHQ 2 0   Trouble falling or staying asleep, or sleeping too much -   Feeling tired or having little energy -   Poor appetite, weight loss, or overeating -   Feeling bad about yourself - or that you are a failure or have let yourself or your family down -   Trouble concentrating on things such as school, work, reading, or watching TV -   Moving or speaking so slowly that other people could have noticed; or the opposite being so fidgety that others notice -   Thoughts of being better off dead, or hurting yourself in some way -   PHQ 9 Score -   How difficult have these problems made it for you to do your work, take care of your home and get along with others -       Learning Assessment:  Learning Assessment 4/27/2017   PRIMARY LEARNER Patient   CO-LEARNER CAREGIVER No   PRIMARY LANGUAGE ENGLISH   LEARNER PREFERENCE PRIMARY DEMONSTRATION   ANSWERED BY Patient   RELATIONSHIP SELF       Abuse Screening:  Abuse Screening Questionnaire 6/6/2019   Do you ever feel afraid of your partner? N   Are you in a relationship with someone who physically or mentally threatens you? N   Is it safe for you to go home? Y       Fall Risk  Fall Risk Assessment, last 12 mths 6/6/2019   Able to walk? Yes   Fall in past 12 months? No       Health Maintenance reviewed and discussed and ordered per Provider. Health Maintenance Due   Topic Date Due    DTaP/Tdap/Td series (1 - Tdap) 06/05/2012    PAP AKA CERVICAL CYTOLOGY  06/05/2012    A1C test (Diabetic or Prediabetic)  05/25/2019   . Coordination of Care:  1. Have you been to the ER, urgent care clinic since your last visit? Hospitalized since your last visit? no    2. Have you seen or consulted any other health care providers outside of the [address] [place] [address] since your last visit? Include any pap smears or colon screening. no      Last  Checked na  Last UDS Checked na  Last Pain contract signed: na    Patient presents in office today for routine care.   Patient concerns: med refills, congestion and feet swollen

## 2020-04-28 ENCOUNTER — TELEPHONE (OUTPATIENT)
Dept: FAMILY MEDICINE CLINIC | Age: 30
End: 2020-04-28

## 2020-04-28 NOTE — TELEPHONE ENCOUNTER
Please get specifics on the medication; what is her goal and what does she know about this medication? Thank you.

## 2020-04-28 NOTE — TELEPHONE ENCOUNTER
Pt. Is requesting a prescription for lipozene for weight loss. Pt. Was last seen on 04/23/2020.  Please assist.

## 2020-04-29 NOTE — TELEPHONE ENCOUNTER
Called patient made her an appointment for 4/30/20 to discuss her insulin, she states its not working.  Also, wants to discuss lipozene as advertised on TV for weight loss

## 2020-04-30 ENCOUNTER — VIRTUAL VISIT (OUTPATIENT)
Dept: FAMILY MEDICINE CLINIC | Age: 30
End: 2020-04-30

## 2020-04-30 DIAGNOSIS — I10 ESSENTIAL HYPERTENSION: ICD-10-CM

## 2020-04-30 DIAGNOSIS — F43.21 GRIEVING: ICD-10-CM

## 2020-04-30 DIAGNOSIS — F32.2 SEVERE MAJOR DEPRESSION (HCC): ICD-10-CM

## 2020-04-30 DIAGNOSIS — E66.01 MORBID OBESITY WITH BMI OF 70 AND OVER, ADULT (HCC): Primary | ICD-10-CM

## 2020-04-30 RX ORDER — CITALOPRAM 20 MG/1
20 TABLET, FILM COATED ORAL DAILY
Qty: 30 TAB | Refills: 11 | Status: SHIPPED | OUTPATIENT
Start: 2020-04-30 | End: 2021-10-12

## 2020-04-30 RX ORDER — LISINOPRIL AND HYDROCHLOROTHIAZIDE 20; 25 MG/1; MG/1
1 TABLET ORAL DAILY
Qty: 90 TAB | Refills: 3 | Status: SHIPPED | OUTPATIENT
Start: 2020-04-30 | End: 2021-10-12

## 2020-04-30 NOTE — PROGRESS NOTES
Preston Boeck is a 30 y.o.  female and has    Chief Complaint   Patient presents with    Diabetes    Hypertension    Weight Management    Depression     She is evaluated via DOXY on 4/30/2020. Consent:  She and/or health care decision maker is aware that she may receive a bill for this audio/video service, depending on her insurance coverage, and has provided verbal consent to proceed: Yes  Pt is at home, and I am at [facility]. Danielle Kruger LPN assisted with this visit. Documentation:  I communicated with the patient and/or health care decision maker about weight management and depression. Details of this discussion including any medical advice provided: see below      I affirm this is a Patient Initiated Episode with an Established Patient who has not had a related appointment within my department in the past 7 days or scheduled within the next 24 hours. Total Time: minutes: 11-20 minutes    This visit was conducted as a synchronous telemedicine service rendered via real-time interactive audio and video telecommunications system. On March 11, 2020, the [facility] declared the COVID-19 (Novel Coronavirus) viral disease to be a pandemic. As a result of this emergency, a rapidly evolving situation, practice patterns for physicians, physician assistants, and nurse practitioners are shifting to accommodate the need to treat in conjunction with unprecedented guidance from federal, state, and local authorities which include, but are not limited to, self-quarantines and/or limiting physical proximity to others under any number of circumstances. It is within this context (and with the understanding that this method of patient encounter is in the patient's best interest as well as the health and safety of other patients and the public) that Dianah Moritz is being provided for this patient encounter rather than a face-to-face visit.  This patient encounter is appropriate and reasonable under the circumstances given the patient's particular presentation at this time. Subjective:  She has questions regarding lipozene which she saw commercial for on TV. She wants to lose weight. She was started on victoza to decrease glucose and assist her in weight loss  Depression Review:  She has depression, but her mood is greatly improved. Treatment includes Celexa and no other therapies. Ongoing symptoms include depressed mood. She denies anhedonia, weight loss, insomnia, fatigue, difficulty concentrating, hopelessness, impaired memory and recurrent thoughts of death.   She experiences the following side effects from the treatment: none.     She has hypertension; she is taking medication as prescribed. She denies chest pain or palpitations    ROS   General ROS: negative for - chills or fever; + weight gain  Ophthalmic ROS: positive for - uses glasses  ENT ROS: negative for - headaches, nasal congestion or sore throat  Endocrine ROS: negative for - polydipsia/polyuria or temperature intolerance  Respiratory ROS: no cough, shortness of breath, or wheezing  Cardiovascular ROS: no chest pain or dyspnea on exertion  Gastrointestinal ROS: no abdominal pain, change in bowel habits, or black or bloody stools  Genito-Urinary ROS: no dysuria, trouble voiding, or hematuria  Musculoskeletal ROS: negative for - joint pain or muscle pain  Neurological ROS: negative for - numbness/tingling or weakness  Dermatological ROS: negative for - rash or skin lesion changes     All other systems reviewed and are negative. Objective:  Vitals:    04/30/20 1328   PainSc:   0 - No pain   LMP: 03/21/2020     alert, well appearing, and in no distress, oriented to person, place, and time and morbidly obese  Mental status - normal mood, behavior, speech, dress, motor activity, and thought processes  Chest - normal work of breathing  Neurological - cranial nerves II through XII intact    Assessment/Plan:    1. Grieving  Mood improved  - citalopram (CELEXA) 20 mg tablet; Take 1 Tab by mouth daily. Dispense: 30 Tab; Refill: 11    2. Severe major depression ([facility]  Continue treatment at this time  - citalopram (CELEXA) 20 mg tablet; Take 1 Tab by mouth daily. Dispense: 30 Tab; Refill: 11    3. Essential hypertension  Goal <130/80  - lisinopril-hydroCHLOROthiazide (PRINZIDE, ZESTORETIC) 20-25 mg per tablet; Take 1 Tab by mouth daily. Dispense: 90 Tab; Refill: 3    4. Morbid obesity with BMI of 70 and over, adult ([facility]  I have reviewed/discussed the above normal BMI with the patient. I have recommended the following interventions: dietary management education, guidance, and counseling, encourage exercise and monitor weight . Yohannes Pascual She is using glp 1 for weight loss; she is instructed to avoid lipozene. Pt voiced understanding. Lab review: no lab studies available for review at time of visit      I have discussed the diagnosis with the patient and the intended plan as seen in the above orders. I have discussed medication side effects and warnings with the patient as well. I have reviewed the plan of care with the patient, accepted their input and they are in agreement with the treatment goals.

## 2020-04-30 NOTE — PROGRESS NOTES
Elysia Rahman presents today for   Chief Complaint   Patient presents with    Diabetes    Hypertension    Weight Management    Depression       Is someone accompanying this pt? na    Is the patient using any DME equipment during OV? na    Depression Screening:  3 most recent PHQ Screens 1/23/2020   Little interest or pleasure in doing things Not at all   Feeling down, depressed, irritable, or hopeless Not at all   Total Score PHQ 2 0   Trouble falling or staying asleep, or sleeping too much -   Feeling tired or having little energy -   Poor appetite, weight loss, or overeating -   Feeling bad about yourself - or that you are a failure or have let yourself or your family down -   Trouble concentrating on things such as school, work, reading, or watching TV -   Moving or speaking so slowly that other people could have noticed; or the opposite being so fidgety that others notice -   Thoughts of being better off dead, or hurting yourself in some way -   PHQ 9 Score -   How difficult have these problems made it for you to do your work, take care of your home and get along with others -       Learning Assessment:  Learning Assessment 4/27/2017   PRIMARY LEARNER Patient   CO-LEARNER CAREGIVER No   PRIMARY LANGUAGE ENGLISH   LEARNER PREFERENCE PRIMARY DEMONSTRATION   ANSWERED BY Patient   RELATIONSHIP SELF       Abuse Screening:  Abuse Screening Questionnaire 6/6/2019   Do you ever feel afraid of your partner? N   Are you in a relationship with someone who physically or mentally threatens you? N   Is it safe for you to go home? Y       Fall Risk  Fall Risk Assessment, last 12 mths 6/6/2019   Able to walk? Yes   Fall in past 12 months? No       Health Maintenance reviewed and discussed and ordered per Provider. Health Maintenance Due   Topic Date Due    DTaP/Tdap/Td series (1 - Tdap) 06/05/2012    PAP AKA CERVICAL CYTOLOGY  06/05/2012    A1C test (Diabetic or Prediabetic)  05/25/2019   .       Coordination of Care:  1. Have you been to the ER, urgent care clinic since your last visit? Hospitalized since your last visit? no    2. Have you seen or consulted any other health care providers outside of the [address] [place] [address] since your last visit? Include any pap smears or colon screening. no      Last  Checked na  Last UDS Checked na  Last Pain contract signed: na    Patient presents in office today for routine care. Patient concerns: discuss diabetic treatment and lipozene for weight management. Also cough and congestion.

## 2020-07-07 ENCOUNTER — TELEPHONE (OUTPATIENT)
Dept: FAMILY MEDICINE CLINIC | Age: 30
End: 2020-07-07

## 2020-08-11 ENCOUNTER — VIRTUAL VISIT (OUTPATIENT)
Dept: FAMILY MEDICINE CLINIC | Age: 30
End: 2020-08-11

## 2020-08-11 DIAGNOSIS — E66.01 MORBID OBESITY WITH BMI OF 70 AND OVER, ADULT (HCC): ICD-10-CM

## 2020-08-11 DIAGNOSIS — F33.41 RECURRENT MAJOR DEPRESSIVE DISORDER, IN PARTIAL REMISSION (HCC): ICD-10-CM

## 2020-08-11 DIAGNOSIS — I10 ESSENTIAL HYPERTENSION: ICD-10-CM

## 2020-08-11 DIAGNOSIS — R51.9 FRONTAL HEADACHE: Primary | ICD-10-CM

## 2020-08-11 NOTE — PROGRESS NOTES
Devyn Stanton is a 35 y.o. black female and presents with    Chief Complaint   Patient presents with    Headache     Devyn Stanton, who was evaluated through a synchronous (real-time) audio-video encounter, and/or her healthcare decision maker, is aware that it is a billable service, with coverage as determined by her insurance carrier. She provided verbal consent to proceed: Yes, and patient identification was verified. It was conducted pursuant to the emergency declaration under the [facility], [address] [place] [address] and the [employer] Act. A caregiver was present when appropriate. Ability to conduct physical exam was limited. I was in the office. The patient was at home. Subjective:  She had a headache last week; she took tylenol and it improved; she had the headache for 3 days prior to taking medication; her symptoms improved with the treatment. She has increased her water intake. She stopped her sugary soft drinks. Headache was located in the right side and left side frontal; she denies vision changes; she had nausea and had an episode of vomiting;   Depression Review:  She has depression, but her mood is greatly improved. Treatment includes Celexa and no other therapies. Ongoing symptoms include depressed mood. She denies anhedonia, weight loss, insomnia, fatigue, difficulty concentrating, hopelessness, impaired memory and recurrent thoughts of death.   She experiences the following side effects from the treatment: none.     She has hypertension; she is taking medication as prescribed.  She denies chest pain or palpitations     ROS   General ROS: negative for - chills or fever; + weight gain  Ophthalmic ROS: positive for - uses glasses  ENT ROS: negative for - nasal congestion or sore throat  Endocrine ROS: negative for - polydipsia/polyuria or temperature intolerance  Respiratory ROS: no cough, shortness of breath, or wheezing  Cardiovascular ROS: no chest pain or dyspnea on exertion  Gastrointestinal ROS: no abdominal pain, change in bowel habits, or black or bloody stools  Genito-Urinary ROS: no dysuria, trouble voiding, or hematuria  Musculoskeletal ROS: negative for - joint pain or muscle pain  Neurological ROS: negative for - numbness/tingling or weakness  Dermatological ROS: negative for - rash or skin lesion changes     All other systems reviewed and are negative. Objective: There were no vitals filed for this visit. alert, well appearing, and in no distress, oriented to person, place, and time and morbidly obese  Mental status - normal mood, behavior, speech, dress, motor activity, and thought processes  Chest - normal work of breathing  Neurological - cranial nerves II through XII intact    LABS     TESTS      Assessment/Plan:    1. Frontal headache  Continue tylenol     2. Recurrent major depressive disorder, in partial remission ([facility]  Continue ssri    3. Essential hypertension  Goal <130/80     4. Morbid obesity with BMI of 70 and over, adult ([facility]  I have reviewed/discussed the above normal BMI with the patient. I have recommended the following interventions: dietary management education, guidance, and counseling and encourage exercise . Deshawn Duke Lab review: no lab studies available for review at time of visit      I have discussed the diagnosis with the patient and the intended plan as seen in the above orders. I have discussed medication side effects and warnings with the patient as well. I have reviewed the plan of care with the patient, accepted their input and they are in agreement with the treatment goals.

## 2020-08-11 NOTE — PROGRESS NOTES
Mehul Zacarias presents today for   Chief Complaint   Patient presents with    Headache       Is someone accompanying this pt? na    Is the patient using any DME equipment during OV? na    Depression Screening:  3 most recent PHQ Screens 1/23/2020   Little interest or pleasure in doing things Not at all   Feeling down, depressed, irritable, or hopeless Not at all   Total Score PHQ 2 0   Trouble falling or staying asleep, or sleeping too much -   Feeling tired or having little energy -   Poor appetite, weight loss, or overeating -   Feeling bad about yourself - or that you are a failure or have let yourself or your family down -   Trouble concentrating on things such as school, work, reading, or watching TV -   Moving or speaking so slowly that other people could have noticed; or the opposite being so fidgety that others notice -   Thoughts of being better off dead, or hurting yourself in some way -   PHQ 9 Score -   How difficult have these problems made it for you to do your work, take care of your home and get along with others -       Learning Assessment:  Learning Assessment 4/27/2017   PRIMARY LEARNER Patient   CO-LEARNER CAREGIVER No   PRIMARY LANGUAGE ENGLISH   LEARNER PREFERENCE PRIMARY DEMONSTRATION   ANSWERED BY Patient   RELATIONSHIP SELF       Abuse Screening:  Abuse Screening Questionnaire 6/6/2019   Do you ever feel afraid of your partner? N   Are you in a relationship with someone who physically or mentally threatens you? N   Is it safe for you to go home? Y       Fall Risk  Fall Risk Assessment, last 12 mths 6/6/2019   Able to walk? Yes   Fall in past 12 months? No       Health Maintenance reviewed and discussed and ordered per Provider. Health Maintenance Due   Topic Date Due    DTaP/Tdap/Td series (1 - Tdap) 06/05/2012    PAP AKA CERVICAL CYTOLOGY  06/05/2012    A1C test (Diabetic or Prediabetic)  05/25/2019    Influenza Age 9 to Adult  08/01/2020   . Coordination of Care:  1. Have you been to the ER, urgent care clinic since your last visit? Hospitalized since your last visit? no    2. Have you seen or consulted any other health care providers outside of the [facility] since your last visit? Include any pap smears or colon screening.  no      Last  Checked na  Last UDS Checked na  Last Pain contract signed: arti    Patients concerns today:  Headaches and med refills

## 2020-12-09 ENCOUNTER — VIRTUAL VISIT (OUTPATIENT)
Dept: FAMILY MEDICINE CLINIC | Age: 30
End: 2020-12-09
Payer: MEDICARE

## 2020-12-09 DIAGNOSIS — E66.01 MORBID OBESITY WITH BMI OF 70 AND OVER, ADULT (HCC): ICD-10-CM

## 2020-12-09 DIAGNOSIS — F33.41 RECURRENT MAJOR DEPRESSIVE DISORDER, IN PARTIAL REMISSION (HCC): ICD-10-CM

## 2020-12-09 DIAGNOSIS — Z00.00 MEDICARE ANNUAL WELLNESS VISIT, SUBSEQUENT: ICD-10-CM

## 2020-12-09 DIAGNOSIS — I10 ESSENTIAL HYPERTENSION: ICD-10-CM

## 2020-12-09 DIAGNOSIS — N30.00 ACUTE CYSTITIS WITHOUT HEMATURIA: Primary | ICD-10-CM

## 2020-12-09 PROCEDURE — G8432 DEP SCR NOT DOC, RNG: HCPCS | Performed by: FAMILY MEDICINE

## 2020-12-09 PROCEDURE — G8428 CUR MEDS NOT DOCUMENT: HCPCS | Performed by: FAMILY MEDICINE

## 2020-12-09 PROCEDURE — G8417 CALC BMI ABV UP PARAM F/U: HCPCS | Performed by: FAMILY MEDICINE

## 2020-12-09 PROCEDURE — G0439 PPPS, SUBSEQ VISIT: HCPCS | Performed by: FAMILY MEDICINE

## 2020-12-09 PROCEDURE — 99213 OFFICE O/P EST LOW 20 MIN: CPT | Performed by: FAMILY MEDICINE

## 2020-12-09 PROCEDURE — G8756 NO BP MEASURE DOC: HCPCS | Performed by: FAMILY MEDICINE

## 2020-12-09 RX ORDER — NITROFURANTOIN 25; 75 MG/1; MG/1
100 CAPSULE ORAL 2 TIMES DAILY
Qty: 14 CAP | Refills: 0 | Status: SHIPPED | OUTPATIENT
Start: 2020-12-09 | End: 2021-09-07 | Stop reason: ALTCHOICE

## 2020-12-09 NOTE — PROGRESS NOTES
Elidia Ledesma presents today for   Chief Complaint   Patient presents with    Bladder Infection       Is someone accompanying this pt? na    Is the patient using any DME equipment during OV? na    Depression Screening:  3 most recent PHQ Screens 1/23/2020   Little interest or pleasure in doing things Not at all   Feeling down, depressed, irritable, or hopeless Not at all   Total Score PHQ 2 0   Trouble falling or staying asleep, or sleeping too much -   Feeling tired or having little energy -   Poor appetite, weight loss, or overeating -   Feeling bad about yourself - or that you are a failure or have let yourself or your family down -   Trouble concentrating on things such as school, work, reading, or watching TV -   Moving or speaking so slowly that other people could have noticed; or the opposite being so fidgety that others notice -   Thoughts of being better off dead, or hurting yourself in some way -   PHQ 9 Score -   How difficult have these problems made it for you to do your work, take care of your home and get along with others -       Learning Assessment:  Learning Assessment 4/27/2017   PRIMARY LEARNER Patient   CO-LEARNER CAREGIVER No   PRIMARY LANGUAGE ENGLISH   LEARNER PREFERENCE PRIMARY DEMONSTRATION   ANSWERED BY Patient   RELATIONSHIP SELF       Abuse Screening:  Abuse Screening Questionnaire 6/6/2019   Do you ever feel afraid of your partner? N   Are you in a relationship with someone who physically or mentally threatens you? N   Is it safe for you to go home? Y       Fall Risk  Fall Risk Assessment, last 12 mths 6/6/2019   Able to walk? Yes   Fall in past 12 months? No       Health Maintenance reviewed and discussed and ordered per Provider.     Health Maintenance Due   Topic Date Due    DTaP/Tdap/Td series (1 - Tdap) 06/05/2012    PAP AKA CERVICAL CYTOLOGY  06/05/2012    A1C test (Diabetic or Prediabetic)  05/25/2019    Flu Vaccine (1) 09/01/2020    Medicare Yearly Exam  12/12/2020 .      Coordination of Care:  1. Have you been to the ER, urgent care clinic since your last visit? Hospitalized since your last visit? no    2. Have you seen or consulted any other health care providers outside of the [address] [place] [address] since your last visit? Include any pap smears or colon screening.  no      Last  Checked na  Last UDS Checked na  Last Pain contract signed: na    Patients concerns today:  Possible UTI

## 2020-12-09 NOTE — PROGRESS NOTES
(AWV) The  Medicare Annual Wellness Exam PROGRESS NOTE    This is a Medicare Annual Wellness Exam (AWV)    I have reviewed the patient's medical history in detail and updated the computerized patient record. Lexii Rg is a 35 y.o. black female and presents for an annual wellness exam   Lexii Rg, who was evaluated through a synchronous (real-time) audio-video encounter, and/or her healthcare decision maker, is aware that it is a billable service, with coverage as determined by her insurance carrier. She provided verbal consent to proceed: Yes, and patient identification was verified. It was conducted pursuant to the emergency declaration under the [facility], [address] [place] [address] authority and the [employer] General Act. A caregiver was present when appropriate. Ability to conduct physical exam was limited. I was in the office. The patient was at home. ROS   General ROS: negative for - chills or fever; + weight gain  Ophthalmic ROS: positive for - uses glasses  ENT ROS: negative for - nasal congestion or sore throat  Endocrine ROS: negative for - polydipsia/polyuria or temperature intolerance  Respiratory ROS: no cough, shortness of breath, or wheezing  Cardiovascular ROS: no chest pain or dyspnea on exertion  Gastrointestinal ROS: no abdominal pain, change in bowel habits, or black or bloody stools  Genito-Urinary ROS: + dysuria and increased frequency  Musculoskeletal ROS: negative for - joint pain or muscle pain  Neurological ROS: negative for - numbness/tingling or weakness  Dermatological ROS: negative for - rash or skin lesion changes    All other systems reviewed and are negative. History     Past Medical History:   Diagnosis Date    HTN (hypertension)     Morbid obesity with BMI of 70 and over, adult ([facility]       No past surgical history on file.   Current Outpatient Medications   Medication Sig Dispense Refill    nitrofurantoin, macrocrystal-monohydrate, (Macrobid) 100 mg capsule Take 1 Cap by mouth two (2) times a day. 14 Cap 0    citalopram (CELEXA) 20 mg tablet Take 1 Tab by mouth daily. 30 Tab 11    lisinopril-hydroCHLOROthiazide (PRINZIDE, ZESTORETIC) 20-25 mg per tablet Take 1 Tab by mouth daily. 90 Tab 3    liraglutide (VICTOZA) 0.6 mg/0.1 mL (18 mg/3 mL) pnij 1.8 mg by SubCUTAneous route daily. 1 Each 5    verapamil ER (VERELAN PM) 100 mg capsule Take 1 Cap by mouth nightly. 90 Cap 3    norgestimate-ethinyl estradioL (ORTHO TRI-CYCLEN LO) 0.18/0.215/0.25 mg-25 mcg tab Take 1 Tab by mouth daily. 84 Tab 3    ergocalciferol (ERGOCALCIFEROL) 50,000 unit capsule Take 1 Cap by mouth every seven (7) days. 4 Cap 11    ferrous sulfate (IRON) 325 mg (65 mg iron) EC tablet Take 1 Tab by mouth Daily (before breakfast). 30 Tab 2    Insulin Needles, Disposable, 31 gauge x 5/16\" ndle Use needle with pen once a day 30 Pen Needle 3    omeprazole (PRILOSEC) 20 mg capsule 20 mg. Allergies   Allergen Reactions    Phentermine Angioedema     Family History   Problem Relation Age of Onset    Hypertension Mother     No Known Problems Father      Social History     Tobacco Use    Smoking status: Never Smoker    Smokeless tobacco: Never Used   Substance Use Topics    Alcohol use: Not on file     Patient Active Problem List   Diagnosis Code    Essential hypertension I10    Prediabetes R73.03    Morbid obesity with BMI of 70 and over, adult ([facility] E66.01, Z68.45    Advance care planning Z71.89       Health Maintenance History  Immunizations reviewed, dtap due , pneumovax up to date, flu due, zoster n/a  Colonoscopy: n/a,   Eye exam: due      Depression Risk Factor Screening:      Patient Health Questionnaire (PHQ-2)   Over the last 2 weeks, how often have you been bothered by any of the following problems? · Little interest or pleasure in doing things? · Not at all.  [0]  · Feeling down, depressed, or hopeless? · Not at all. [0]    Total Score: 0/6  PHQ-2 Assessment Scoring:   A score of 2 or more requires further screening with the PHQ-9    Alcohol Risk Factor Screening:     Women: On any occasion during the past 3 months, have you had more than 3 drinks containing alcohol? no   Do you average more than 7 drinks per week? no    Functional Ability and Level of Safety:     Hearing Loss    Hearing is good. Activities of Daily Living   Self-care. Requires assistance with: no ADLs    Fall Risk   Secondary diagnoses (15 pts)  Impaired (20 pts)  Score: 35    Abuse Screen   Patient is not abused    Examination   Physical Examination  There were no vitals filed for this visit. There is no height or weight on file to calculate BMI. Evaluation of Cognitive Function:  Mood/affect:good mood with appropriate affect  Appearance: well kempt  Family member/caregiver input: n/a    alert, well appearing, and in no distress, oriented to person, place, and time and morbidly obese    Patient Care Team:  Claudene Even, MD as PCP - General (Family Medicine)  Ledy Hill MD (General Surgery)    End-of-life planning  Advanced Directive in the case than an injury or illness causes the patient to be unable to make health care decisions    Health Care Directive or Living Will: yes    Advice/Referrals/Counselling/Plan:   Education and counseling provided:  End-of-Life planning (with patient's consent)  Influenza Vaccine  Diabetes screening test  Include in education list (weight loss, physical activity, smoking cessation, fall prevention, and nutrition)    ICD-10-CM ICD-9-CM    1. Acute cystitis without hematuria  N30.00 595.0 nitrofurantoin, macrocrystal-monohydrate, (Macrobid) 100 mg capsule   . Brief written plan, checklist    I have discussed the diagnosis with the patient and the intended plan as seen in the above orders.   The patient has received an after-visit summary and questions were answered concerning future plans. I have discussed medication side effects and warnings with the patient as well. I have reviewed the plan of care with the patient, accepted their input and they are in agreement with the treatment goals. ____________________________________________________________    Problem Assessment    for treatment of   Chief Complaint   Patient presents with    Bladder Infection         SUBJECTIVE    Urinary Tract Infection  Patient complains of dysuria, frequency, urgency, foul smelling urine. Onset was several days ago, gradually worsening since that time. Patient complains of back pain and stomach ache. Patient denies congestion, cough, fever, headache, rhinitis and sorethroat. There is not any concern of sexual abuse. There is not a history of trauma to the genital area. Patient does not have a history of recurrent UTI. Patient does not have a history of pyelonephritis. Depression Review:  She has depression, but her mood is greatly improved. Treatment includes Celexa and no other therapies. Ongoing symptoms include depressed mood. She denies anhedonia, weight loss, insomnia, fatigue, difficulty concentrating, hopelessness, impaired memory and recurrent thoughts of death.   She experiences the following side effects from the treatment: none  General appearance - alert, well appearing, and in no distress  Mental status - normal mood, behavior, speech, dress, motor activity, and thought processes  Chest - normal work of breathing  Neurological - cranial nerves II through XII intact    LABS     TESTS      Assessment/Plan:    1. Acute cystitis without hematuria  abx ordered  - nitrofurantoin, macrocrystal-monohydrate, (Macrobid) 100 mg capsule; Take 1 Cap by mouth two (2) times a day. Dispense: 14 Cap; Refill: 0    2. Medicare annual wellness visit, subsequent  Reviewed preventive recommendations  - [id number]    3.  Recurrent major depressive disorder, in partial remission ([facility]  Mood improved    4. Essential hypertension  Goal <130/80    5. Morbid obesity with BMI of 70 and over, adult ([facility]  I have reviewed/discussed the above normal BMI with the patient. I have recommended the following interventions: dietary management education, guidance, and counseling and encourage exercise . [employer]           Lab review: labs reviewed, none available

## 2020-12-22 ENCOUNTER — TELEPHONE (OUTPATIENT)
Dept: FAMILY MEDICINE CLINIC | Age: 30
End: 2020-12-22

## 2020-12-29 ENCOUNTER — VIRTUAL VISIT (OUTPATIENT)
Dept: FAMILY MEDICINE CLINIC | Age: 30
End: 2020-12-29
Payer: MEDICARE

## 2020-12-29 DIAGNOSIS — N92.0 MENORRHAGIA WITH REGULAR CYCLE: Primary | ICD-10-CM

## 2020-12-29 DIAGNOSIS — I10 ESSENTIAL HYPERTENSION: ICD-10-CM

## 2020-12-29 DIAGNOSIS — E66.01 MORBID OBESITY WITH BMI OF 70 AND OVER, ADULT (HCC): ICD-10-CM

## 2020-12-29 PROCEDURE — 99213 OFFICE O/P EST LOW 20 MIN: CPT | Performed by: FAMILY MEDICINE

## 2020-12-29 PROCEDURE — G8428 CUR MEDS NOT DOCUMENT: HCPCS | Performed by: FAMILY MEDICINE

## 2020-12-29 PROCEDURE — G8510 SCR DEP NEG, NO PLAN REQD: HCPCS | Performed by: FAMILY MEDICINE

## 2020-12-29 PROCEDURE — G8756 NO BP MEASURE DOC: HCPCS | Performed by: FAMILY MEDICINE

## 2020-12-29 NOTE — PROGRESS NOTES
Jered Granda is a 35 y.o.  female and presents with    Chief Complaint   Patient presents with    Anal Bleeding     Jered Granda, who was evaluated through a synchronous (real-time) audio-video encounter, and/or her healthcare decision maker, is aware that it is a billable service, with coverage as determined by her insurance carrier. She provided verbal consent to proceed: Yes, and patient identification was verified. It was conducted pursuant to the emergency declaration under the [facility], [address] [place] [address] and the [employer] and [employer] Act. A caregiver was present when appropriate. Ability to conduct physical exam was limited. I was in the office. The patient was at home. Subjective:  She reports that she had her menstrual period and saw blood on her toilet tissue; she has not had abdominal pain. She denies fever or unwanted weight loss. She is taking medication for hypertension. She denies symptoms associated with hypertension. ROS   General ROS: negative for - chills or fever; + weight loss  Ophthalmic ROS: positive for - uses glasses  ENT ROS: negative for - nasal congestion or sore throat  Endocrine ROS: negative for - polydipsia/polyuria or temperature intolerance  Respiratory ROS: no cough, shortness of breath, or wheezing  Cardiovascular ROS: no chest pain or dyspnea on exertion  Gastrointestinal ROS: no abdominal pain, change in bowel habits, or black or bloody stools  Genito-Urinary ROS:  No dysuria or increased frequency  Musculoskeletal ROS: negative for - joint pain or muscle pain  Neurological ROS: negative for - numbness/tingling or weakness  Dermatological ROS: negative for - rash or skin lesion changes    All other systems reviewed and are negative. Objective: There were no vitals filed for this visit.     General appearance - alert, well appearing, and in no distress  Mental status - normal mood, behavior, speech, dress, motor activity, and thought processes  Chest - normal work of breathing  Neurological - cranial nerves II through XII intact    LABS     TESTS      Assessment/Plan:    1. Menorrhagia with regular cycle  Bleeding was not anal but vaginal    2. Morbid obesity with BMI of 70 and over, adult ([facility]  I have reviewed/discussed the above normal BMI with the patient. I have recommended the following interventions: dietary management education, guidance, and counseling and encourage exercise . [address] 3. Essential hypertension  Goal <130/80; continue current medication      Lab review: no lab studies available for review at time of visit      I have discussed the diagnosis with the patient and the intended plan as seen in the above orders. I have discussed medication side effects and warnings with the patient as well. I have reviewed the plan of care with the patient, accepted their input and they are in agreement with the treatment goals.

## 2020-12-29 NOTE — PROGRESS NOTES
Cirilo Treadwell presents today for   Chief Complaint   Patient presents with    Anal Bleeding       Is someone accompanying this pt? na    Is the patient using any DME equipment during OV? na    Depression Screening:  3 most recent PHQ Screens 1/23/2020   Little interest or pleasure in doing things Not at all   Feeling down, depressed, irritable, or hopeless Not at all   Total Score PHQ 2 0   Trouble falling or staying asleep, or sleeping too much -   Feeling tired or having little energy -   Poor appetite, weight loss, or overeating -   Feeling bad about yourself - or that you are a failure or have let yourself or your family down -   Trouble concentrating on things such as school, work, reading, or watching TV -   Moving or speaking so slowly that other people could have noticed; or the opposite being so fidgety that others notice -   Thoughts of being better off dead, or hurting yourself in some way -   PHQ 9 Score -   How difficult have these problems made it for you to do your work, take care of your home and get along with others -       Learning Assessment:  Learning Assessment 4/27/2017   PRIMARY LEARNER Patient   CO-LEARNER CAREGIVER No   PRIMARY LANGUAGE ENGLISH   LEARNER PREFERENCE PRIMARY DEMONSTRATION   ANSWERED BY Patient   RELATIONSHIP SELF       Abuse Screening:  Abuse Screening Questionnaire 6/6/2019   Do you ever feel afraid of your partner? N   Are you in a relationship with someone who physically or mentally threatens you? N   Is it safe for you to go home? Y       Fall Risk  Fall Risk Assessment, last 12 mths 6/6/2019   Able to walk? Yes   Fall in past 12 months? No       Health Maintenance reviewed and discussed and ordered per Provider. Health Maintenance Due   Topic Date Due    DTaP/Tdap/Td series (1 - Tdap) 06/05/2012    PAP AKA CERVICAL CYTOLOGY  06/05/2012    A1C test (Diabetic or Prediabetic)  05/25/2019    Flu Vaccine (1) 09/01/2020   . Coordination of Care:  1.  Have you been to the ER, urgent care clinic since your last visit? Hospitalized since your last visit? no    2. Have you seen or consulted any other health care providers outside of the [address] [place] [address] since your last visit? Include any pap smears or colon screening.  no      Last  Checked na  Last UDS Checked na  Last Pain contract signed: na    Patients concerns today:  Anal bleed for a week

## 2021-04-05 ENCOUNTER — TELEPHONE (OUTPATIENT)
Dept: FAMILY MEDICINE CLINIC | Age: 31
End: 2021-04-05

## 2021-04-05 NOTE — TELEPHONE ENCOUNTER
Patient is wanting to know if Dr. Rashard Gomez, has received paper work from [employer], forms needed to be sent back to [employer] by 4/2/2021

## 2021-04-14 ENCOUNTER — VIRTUAL VISIT (OUTPATIENT)
Dept: FAMILY MEDICINE CLINIC | Age: 31
End: 2021-04-14
Payer: MEDICARE

## 2021-04-14 DIAGNOSIS — F81.9 LEARNING DISABILITIES: Primary | ICD-10-CM

## 2021-04-14 DIAGNOSIS — F33.42 DEPRESSION, MAJOR, RECURRENT, IN COMPLETE REMISSION (HCC): ICD-10-CM

## 2021-04-14 PROCEDURE — G8432 DEP SCR NOT DOC, RNG: HCPCS | Performed by: FAMILY MEDICINE

## 2021-04-14 PROCEDURE — G8427 DOCREV CUR MEDS BY ELIG CLIN: HCPCS | Performed by: FAMILY MEDICINE

## 2021-04-14 PROCEDURE — 99214 OFFICE O/P EST MOD 30 MIN: CPT | Performed by: FAMILY MEDICINE

## 2021-04-14 PROCEDURE — G8756 NO BP MEASURE DOC: HCPCS | Performed by: FAMILY MEDICINE

## 2021-04-14 NOTE — PROGRESS NOTES
Redd Chacon presents today for   Chief Complaint   Patient presents with    Hypertension       Is someone accompanying this pt? na    Is the patient using any DME equipment during OV? na    Depression Screening:  3 most recent PHQ Screens 4/14/2021   Little interest or pleasure in doing things Not at all   Feeling down, depressed, irritable, or hopeless Not at all   Total Score PHQ 2 0   Trouble falling or staying asleep, or sleeping too much -   Feeling tired or having little energy -   Poor appetite, weight loss, or overeating -   Feeling bad about yourself - or that you are a failure or have let yourself or your family down -   Trouble concentrating on things such as school, work, reading, or watching TV -   Moving or speaking so slowly that other people could have noticed; or the opposite being so fidgety that others notice -   Thoughts of being better off dead, or hurting yourself in some way -   PHQ 9 Score -   How difficult have these problems made it for you to do your work, take care of your home and get along with others -       Learning Assessment:  Learning Assessment 4/27/2017   PRIMARY LEARNER Patient   CO-LEARNER CAREGIVER No   PRIMARY LANGUAGE ENGLISH   LEARNER PREFERENCE PRIMARY DEMONSTRATION   ANSWERED BY Patient   RELATIONSHIP SELF       Abuse Screening:  Abuse Screening Questionnaire 6/6/2019   Do you ever feel afraid of your partner? N   Are you in a relationship with someone who physically or mentally threatens you? N   Is it safe for you to go home? Y       Fall Risk  Fall Risk Assessment, last 12 mths 6/6/2019   Able to walk? Yes   Fall in past 12 months? No       Health Maintenance reviewed and discussed and ordered per Provider. Health Maintenance Due   Topic Date Due    Hepatitis C Screening  Never done    DTaP/Tdap/Td series (1 - Tdap) Never done    PAP AKA CERVICAL CYTOLOGY  Never done    A1C test (Diabetic or Prediabetic)  05/25/2019   . Coordination of Care:  1. Have you been to the ER, urgent care clinic since your last visit? Hospitalized since your last visit? no    2. Have you seen or consulted any other health care providers outside of the [address] [place] [address] since your last visit? Include any pap smears or colon screening. no      Last  Checked na  Last UDS Checked na  Last Pain contract signed: arti    Patients concerns today:  SSA paperwork, she will  when completed.

## 2021-04-14 NOTE — PROGRESS NOTES
Tate Alfaro is a 35 y.o.  female and presents with    Chief Complaint   Patient presents with    Hypertension     Tate Alfaro, who was evaluated through a synchronous (real-time) audio-video encounter, and/or her healthcare decision maker, is aware that it is a billable service, with coverage as determined by her insurance carrier. She provided verbal consent to proceed: Yes, and patient identification was verified. This visit was conducted pursuant to the emergency declaration under the [address] [place] [address] and the [employer] and [employer] Act. A caregiver was present when appropriate. Ability to conduct physical exam was limited. The patient was located in a state where the provider was credentialed to provide care. --Deena Smith MD on 4/14/2021 at 3:53 PM    Subjective:  She is following up for evaluation for competence in managing her finances. Her sister is currently overseeing her finances. She has sent some money to her. She is not living with her sister. She is currently staying with cousins. She has learning disability and her mother was her guardian until her death. Her sister has been her guardian and overseer of her finances; she is requesting access to her finances so that she can manage them on her own. She has been given control of an allowance and has shown responsibility in budgeting her spending.      ROS   General ROS: negative for - chills or fever; + weight loss  Ophthalmic ROS: positive for - uses glasses  ENT ROS: negative for - nasal congestion or sore throat  Endocrine ROS: negative for - polydipsia/polyuria or temperature intolerance  Respiratory ROS: no cough, shortness of breath, or wheezing  Cardiovascular ROS: no chest pain or dyspnea on exertion  Gastrointestinal ROS: no abdominal pain, change in bowel habits, or black or bloody stools  Genito-Urinary ROS:  No dysuria or increased frequency  Musculoskeletal ROS: negative for - joint pain or muscle pain  Neurological ROS: negative for - numbness/tingling or weakness  Dermatological ROS: negative for - rash or skin lesion changes     All other systems reviewed and are negative. Objective: There were no vitals filed for this visit. alert, well appearing, and in no distress, oriented to person, place, and time and morbidly obese  Mental status - normal mood, behavior, speech, dress, motor activity, and thought processes    LABS     TESTS    Assessment/Plan:    1. Learning disabilities  Adalberto Goodpasture has had difficulty with learning at her age or grade level throughout her life; however she has demonstrated the ability to budget her finances with assistance; she will be permitted to be the jluis of her disability money based on my recommendation    2. Depression, major, recurrent, in complete remission ([facility]  Mood improved    Lab review: no lab studies available for review at time of visit      I have discussed the diagnosis with the patient and the intended plan as seen in the above orders. I have discussed medication side effects and warnings with the patient as well. I have reviewed the plan of care with the patient, accepted their input and they are in agreement with the treatment goals.

## 2021-09-07 ENCOUNTER — VIRTUAL VISIT (OUTPATIENT)
Dept: FAMILY MEDICINE CLINIC | Age: 31
End: 2021-09-07
Payer: MEDICARE

## 2021-09-07 DIAGNOSIS — E28.2 POLYCYSTIC OVARIAN SYNDROME: ICD-10-CM

## 2021-09-07 DIAGNOSIS — R73.01 IMPAIRED FASTING GLUCOSE: ICD-10-CM

## 2021-09-07 DIAGNOSIS — N92.6 MISSED PERIOD: Primary | ICD-10-CM

## 2021-09-07 DIAGNOSIS — E66.01 MORBID OBESITY WITH BMI OF 70 AND OVER, ADULT (HCC): ICD-10-CM

## 2021-09-07 PROCEDURE — G8427 DOCREV CUR MEDS BY ELIG CLIN: HCPCS | Performed by: FAMILY MEDICINE

## 2021-09-07 PROCEDURE — G8432 DEP SCR NOT DOC, RNG: HCPCS | Performed by: FAMILY MEDICINE

## 2021-09-07 PROCEDURE — 99214 OFFICE O/P EST MOD 30 MIN: CPT | Performed by: FAMILY MEDICINE

## 2021-09-07 PROCEDURE — G8756 NO BP MEASURE DOC: HCPCS | Performed by: FAMILY MEDICINE

## 2021-09-07 RX ORDER — PEN NEEDLE, DIABETIC 30 GX3/16"
NEEDLE, DISPOSABLE MISCELLANEOUS
Qty: 30 PEN NEEDLE | Refills: 3 | Status: SHIPPED | OUTPATIENT
Start: 2021-09-07

## 2021-09-07 RX ORDER — NORGESTIMATE AND ETHINYL ESTRADIOL 7DAYSX3 LO
1 KIT ORAL DAILY
Qty: 84 TABLET | Refills: 3 | Status: SHIPPED | OUTPATIENT
Start: 2021-09-07 | End: 2021-10-12

## 2021-09-07 NOTE — PROGRESS NOTES
Madonna Colin presents today for   Chief Complaint   Patient presents with    Cold Symptoms       Is someone accompanying this pt? na    Is the patient using any DME equipment during OV? na    Depression Screening:  3 most recent PHQ Screens 4/14/2021   Little interest or pleasure in doing things Not at all   Feeling down, depressed, irritable, or hopeless Not at all   Total Score PHQ 2 0   Trouble falling or staying asleep, or sleeping too much -   Feeling tired or having little energy -   Poor appetite, weight loss, or overeating -   Feeling bad about yourself - or that you are a failure or have let yourself or your family down -   Trouble concentrating on things such as school, work, reading, or watching TV -   Moving or speaking so slowly that other people could have noticed; or the opposite being so fidgety that others notice -   Thoughts of being better off dead, or hurting yourself in some way -   PHQ 9 Score -   How difficult have these problems made it for you to do your work, take care of your home and get along with others -       Learning Assessment:  Learning Assessment 4/27/2017   PRIMARY LEARNER Patient   CO-LEARNER CAREGIVER No   PRIMARY LANGUAGE ENGLISH   LEARNER PREFERENCE PRIMARY DEMONSTRATION   ANSWERED BY Patient   RELATIONSHIP SELF       Abuse Screening:  Abuse Screening Questionnaire 6/6/2019   Do you ever feel afraid of your partner? N   Are you in a relationship with someone who physically or mentally threatens you? N   Is it safe for you to go home? Y       Fall Risk  Fall Risk Assessment, last 12 mths 6/6/2019   Able to walk? Yes   Fall in past 12 months? No       Health Maintenance reviewed and discussed and ordered per Provider.     Health Maintenance Due   Topic Date Due    Hepatitis C Screening  Never done    COVID-19 Vaccine (1) Never done    DTaP/Tdap/Td series (1 - Tdap) Never done    PAP AKA CERVICAL CYTOLOGY  Never done    A1C test (Diabetic or Prediabetic) 05/25/2019    Flu Vaccine (1) 09/01/2021   . Coordination of Care:  1. Have you been to the ER, urgent care clinic since your last visit? Hospitalized since your last visit? no    2. Have you seen or consulted any other health care providers outside of the [address] [place] [address] since your last visit? Include any pap smears or colon screening. no      Last  Checked na  Last UDS Checked na  Last Pain contract signed: na    Patients concerns today:  Refill on B/C pills, and cold symptoms.

## 2021-09-07 NOTE — PROGRESS NOTES
Olivier Esparza is a 28 y.o.  female and presents with    Chief Complaint   Patient presents with    Cold Symptoms       Olivier Esparza, who was evaluated through a synchronous (real-time) audio-video encounter, and/or her healthcare decision maker, is aware that it is a billable service, with coverage as determined by her insurance carrier. She provided verbal consent to proceed: Yes, and patient identification was verified. This visit was conducted pursuant to the emergency declaration under the [address] [place] [address] and the [employer]. A caregiver was present when appropriate. Ability to conduct physical exam was limited. The patient was located in a state where the provider was credentialed to provide care. --Dipak Castellon MD on 9/7/2021 at 11:29 AM    Subjective:  She has had a sore throat for the last week and has had congestion and she has been using decongestant. This has provided relief. She has used lozenges with some relief. She reports that she has not had a period for the past 6 months; she has been taking birth control pills. She has had sex in the past 6 months but has not been sexually active in the past six months. She has been trying to lose weight drinking slim fast.  She has lost some weight.         ROS   General ROS: negative for - chills or fever; + weight loss  Ophthalmic ROS: positive for - uses glasses  ENT ROS: negative for - nasal congestion or sore throat  Endocrine ROS: negative for - polydipsia/polyuria or temperature intolerance  Respiratory ROS: no cough, shortness of breath, or wheezing  Cardiovascular ROS: no chest pain or dyspnea on exertion  Gastrointestinal ROS: no abdominal pain, change in bowel habits, or black or bloody stools  Genito-Urinary ROS:  No dysuria or increased frequency  Musculoskeletal ROS: negative for - joint pain or muscle pain  Neurological ROS: negative for - numbness/tingling or weakness  Dermatological ROS: negative for - rash or skin lesion changes     All other systems reviewed and are negative. Objective: There were no vitals filed for this visit. alert, well appearing, and in no distress, oriented to person, place, and time and morbidly obese  Mental status - normal mood, behavior, speech, dress, motor activity, and thought processes  Chest - normal work of breathing with intermittent cough  Neurological - cranial nerves II through XII intact    LABS     TESTS      Assessment/Plan:    1. Missed period  Assess for pregnancy, hypothyroid or anemia  - TSH 3RD GENERATION; Future  - CBC WITH AUTOMATED DIFF; Future  - HCG URINE, QL; Future    2. Morbid obesity with BMI of 70 and over, adult ([facility] [address]  Start GLP 1 to assist with weight loss;   - liraglutide (VICTOZA) 0.6 mg/0.1 mL (18 mg/3 mL) pnij; 1.8 mg by SubCUTAneous route daily. Dispense: 1 Each; Refill: 5  - Insulin Needles, Disposable, 31 gauge x 5/16\" ndle; Use needle with pen once a day  Dispense: 30 Pen Needle; Refill: 3    3. Impaired fasting glucose    - liraglutide (VICTOZA) 0.6 mg/0.1 mL (18 mg/3 mL) pnij; 1.8 mg by SubCUTAneous route daily. Dispense: 1 Each; Refill: 5  - Insulin Needles, Disposable, 31 gauge x 5/16\" ndle; Use needle with pen once a day  Dispense: 30 Pen Needle; Refill: 3    4. Polycystic ovarian syndrome  Irregular period  - norgestimate-ethinyl estradioL (ORTHO TRI-CYCLEN LO) 0.18/0.215/0.25 mg-25 mcg tab; Take 1 Tablet by mouth daily. Dispense: 84 Tablet; Refill: 3      Lab review: orders written for new lab studies as appropriate; see orders    I have discussed the diagnosis with the patient and the intended plan as seen in the above orders. I have discussed medication side effects and warnings with the patient as well.  I have reviewed the plan of care with the patient, accepted their input and they are in agreement with the treatment goals.

## 2021-09-11 LAB
BASOPHILS # BLD AUTO: 0 X10E3/UL (ref 0–0.2)
BASOPHILS NFR BLD AUTO: 1 %
EOSINOPHIL # BLD AUTO: 0.7 X10E3/UL (ref 0–0.4)
EOSINOPHIL NFR BLD AUTO: 11 %
ERYTHROCYTE [DISTWIDTH] IN BLOOD BY AUTOMATED COUNT: 12.6 % (ref 11.7–15.4)
HCG UR QL: POSITIVE
HCT VFR BLD AUTO: 38.7 % (ref 34–46.6)
HGB BLD-MCNC: 12.1 G/DL (ref 11.1–15.9)
IMM GRANULOCYTES # BLD AUTO: 0 X10E3/UL (ref 0–0.1)
IMM GRANULOCYTES NFR BLD AUTO: 0 %
LYMPHOCYTES # BLD AUTO: 1.7 X10E3/UL (ref 0.7–3.1)
LYMPHOCYTES NFR BLD AUTO: 29 %
MCH RBC QN AUTO: 26.9 PG (ref 26.6–33)
MCHC RBC AUTO-ENTMCNC: 31.3 G/DL (ref 31.5–35.7)
MCV RBC AUTO: 86 FL (ref 79–97)
MONOCYTES # BLD AUTO: 0.7 X10E3/UL (ref 0.1–0.9)
MONOCYTES NFR BLD AUTO: 12 %
NEUTROPHILS # BLD AUTO: 2.7 X10E3/UL (ref 1.4–7)
NEUTROPHILS NFR BLD AUTO: 47 %
PLATELET # BLD AUTO: 350 X10E3/UL (ref 150–450)
RBC # BLD AUTO: 4.5 X10E6/UL (ref 3.77–5.28)
SPECIMEN STATUS REPORT, ROLRST: NORMAL
TSH SERPL DL<=0.005 MIU/L-ACNC: 2.17 UIU/ML (ref 0.45–4.5)
WBC # BLD AUTO: 5.8 X10E3/UL (ref 3.4–10.8)

## 2021-09-25 ENCOUNTER — DOCUMENTATION ONLY (OUTPATIENT)
Dept: FAMILY MEDICINE CLINIC | Age: 31
End: 2021-09-25

## 2021-09-25 DIAGNOSIS — Z32.01 POSITIVE PREGNANCY TEST: Primary | ICD-10-CM

## 2021-09-25 DIAGNOSIS — Z32.01 POSITIVE PREGNANCY TEST: ICD-10-CM

## 2021-09-25 NOTE — PROGRESS NOTES
Pregnancy test positive. She is instructed to discontinue victoza and hormone therapy. She reports that her last cycle was in July 2021; she denies sexual relations .   HCG reordered as well as a pelvic ultrasound

## 2021-09-29 ENCOUNTER — HOSPITAL ENCOUNTER (OUTPATIENT)
Dept: ULTRASOUND IMAGING | Age: 31
Discharge: HOME | End: 2021-09-29
Attending: FAMILY MEDICINE
Payer: MEDICARE

## 2021-09-29 DIAGNOSIS — Z32.01 POSITIVE PREGNANCY TEST: ICD-10-CM

## 2021-09-29 PROCEDURE — 76805 OB US >/= 14 WKS SNGL FETUS: CPT

## 2021-09-29 PROCEDURE — 93975 VASCULAR STUDY: CPT

## 2021-10-02 LAB — B-HCG SERPL QL: POSITIVE MIU/ML

## 2021-10-09 ENCOUNTER — DOCUMENTATION ONLY (OUTPATIENT)
Dept: FAMILY MEDICINE CLINIC | Age: 31
End: 2021-10-09

## 2021-10-09 NOTE — PROGRESS NOTES
Called patient to discuss findings of + serum HCG and 13 week fetus on OB ultrasound. Her sister was on the phone during this conversation; Ms. Maite Sanchez was living in another person's home during the time of conception and she denies sexual intercourse. She reports that she does not want the baby. She was presented with options, and they will work to make the best decision for them.

## 2021-10-12 ENCOUNTER — OFFICE VISIT (OUTPATIENT)
Dept: FAMILY MEDICINE CLINIC | Age: 31
End: 2021-10-12
Payer: MEDICARE

## 2021-10-12 VITALS
TEMPERATURE: 98.4 F | BODY MASS INDEX: 53.92 KG/M2 | DIASTOLIC BLOOD PRESSURE: 92 MMHG | RESPIRATION RATE: 16 BRPM | HEART RATE: 90 BPM | OXYGEN SATURATION: 97 % | HEIGHT: 62 IN | SYSTOLIC BLOOD PRESSURE: 181 MMHG | WEIGHT: 293 LBS

## 2021-10-12 DIAGNOSIS — Z3A.15 15 WEEKS GESTATION OF PREGNANCY: Primary | ICD-10-CM

## 2021-10-12 DIAGNOSIS — Z23 NEEDS FLU SHOT: ICD-10-CM

## 2021-10-12 DIAGNOSIS — Z64.0 UNWANTED PREGNANCY: ICD-10-CM

## 2021-10-12 DIAGNOSIS — Z11.59 ENCOUNTER FOR HEPATITIS C SCREENING TEST FOR LOW RISK PATIENT: ICD-10-CM

## 2021-10-12 DIAGNOSIS — R73.01 IMPAIRED FASTING GLUCOSE: ICD-10-CM

## 2021-10-12 DIAGNOSIS — E66.01 MORBID OBESITY WITH BMI OF 70 AND OVER, ADULT (HCC): ICD-10-CM

## 2021-10-12 DIAGNOSIS — I10 ESSENTIAL HYPERTENSION: ICD-10-CM

## 2021-10-12 PROCEDURE — G0008 ADMIN INFLUENZA VIRUS VAC: HCPCS | Performed by: FAMILY MEDICINE

## 2021-10-12 PROCEDURE — 99214 OFFICE O/P EST MOD 30 MIN: CPT | Performed by: FAMILY MEDICINE

## 2021-10-12 PROCEDURE — 90686 IIV4 VACC NO PRSV 0.5 ML IM: CPT | Performed by: FAMILY MEDICINE

## 2021-10-12 PROCEDURE — G8417 CALC BMI ABV UP PARAM F/U: HCPCS | Performed by: FAMILY MEDICINE

## 2021-10-12 PROCEDURE — G8432 DEP SCR NOT DOC, RNG: HCPCS | Performed by: FAMILY MEDICINE

## 2021-10-12 PROCEDURE — G9231 DOC ESRD DIA TRANS PREG: HCPCS | Performed by: FAMILY MEDICINE

## 2021-10-12 RX ORDER — SWAB
1 SWAB, NON-MEDICATED MISCELLANEOUS DAILY
Qty: 100 TABLET | Refills: 12 | Status: SHIPPED | OUTPATIENT
Start: 2021-10-12

## 2021-10-12 RX ORDER — VERAPAMIL HYDROCHLORIDE 200 MG/1
200 CAPSULE, EXTENDED RELEASE ORAL
Qty: 90 CAPSULE | Refills: 3 | Status: SHIPPED | OUTPATIENT
Start: 2021-10-12

## 2021-10-12 NOTE — PROGRESS NOTES
Lynn Sukhdeep presents today for   Chief Complaint   Patient presents with    Hypertension       Is someone accompanying this pt? no    Is the patient using any DME equipment during [address]? no    Depression Screening:  3 most recent PHQ Screens 4/14/2021   Little interest or pleasure in doing things Not at all   Feeling down, depressed, irritable, or hopeless Not at all   Total Score PHQ 2 0   Trouble falling or staying asleep, or sleeping too much -   Feeling tired or having little energy -   Poor appetite, weight loss, or overeating -   Feeling bad about yourself - or that you are a failure or have let yourself or your family down -   Trouble concentrating on things such as school, work, reading, or watching TV -   Moving or speaking so slowly that other people could have noticed; or the opposite being so fidgety that others notice -   Thoughts of being better off dead, or hurting yourself in some way -   PHQ 9 Score -   How difficult have these problems made it for you to do your work, take care of your home and get along with others -       Learning Assessment:  Learning Assessment 4/27/2017   PRIMARY LEARNER Patient   CO-LEARNER CAREGIVER No   PRIMARY LANGUAGE ENGLISH   LEARNER PREFERENCE PRIMARY DEMONSTRATION   ANSWERED BY Patient   RELATIONSHIP SELF       Abuse Screening:  Abuse Screening Questionnaire 6/6/2019   Do you ever feel afraid of your partner? N   Are you in a relationship with someone who physically or mentally threatens you? N   Is it safe for you to go home? Y       Fall Risk  Fall Risk Assessment, last 12 mths 6/6/2019   Able to walk? Yes   Fall in past 12 months? No       Health Maintenance reviewed and discussed and ordered per Provider.     Health Maintenance Due   Topic Date Due    Hepatitis C Screening  Never done    COVID-19 Vaccine (1) Never done    DTaP/Tdap/Td series (1 - Tdap) Never done    A1C test (Diabetic or Prediabetic)  05/25/2019    Cervical cancer screen  Never done  Flu Vaccine (1) 09/01/2021   . Coordination of Care:  1. Have you been to the ER, urgent care clinic since your last visit? Hospitalized since your last visit? no    2. Have you seen or consulted any other health care providers outside of the [address] [place] [address] since your last visit? Include any pap smears or colon screening. no      Last  Checked na  Last UDS Checked na  Last Pain contract signed: na    Patient presents in office today for routine care.   Patient concerns: discuss pregnancy

## 2021-10-12 NOTE — PROGRESS NOTES
Rob Champion is a 28 y.o.  female and presents with    Chief Complaint   Patient presents with    Hypertension     Subjective:  Ms. Melo Even presents for f/u after positive pregnancy test and hypertension. She is anxious about her pregnancy and reports that she does not want to be pregnant. Cardiovascular Review:  The patient has hypertension and obesity. Diet and Lifestyle: not attempting to follow a low fat, low cholesterol diet, not attempting to follow a low sodium diet, does not rigorously follow a diabetic diet, sedentary, nonsmoker  Home BP Monitoring: is not measured at home. Pertinent ROS: taking medications as instructed, no medication side effects noted, no TIA's, no chest pain on exertion, no dyspnea on exertion, no swelling of ankles. She has been tearful, depressed and anxious with the news of this pregnancy; she has stopped her medications due to the pregnancy. ROS   General ROS: negative for - chills or fever; + weight gain  Ophthalmic ROS: positive for - uses glasses  ENT ROS: negative for - nasal congestion or sore throat  Endocrine ROS: negative for - polydipsia/polyuria or temperature intolerance  Respiratory ROS: no cough, shortness of breath, or wheezing  Cardiovascular ROS: no chest pain or dyspnea on exertion  Gastrointestinal ROS: no abdominal pain, change in bowel habits, or black or bloody stools  Genito-Urinary ROS:  No dysuria or increased frequency  Musculoskeletal ROS: negative for - joint pain or muscle pain  Neurological ROS: negative for - numbness/tingling or weakness  Dermatological ROS: negative for - rash or skin lesion changes     All other systems reviewed and are negative.       Objective:  Vitals:    10/12/21 1505   BP: (!) 181/92   Pulse: 90   Resp: 16   Temp: 98.4 °F (36.9 °C)   TempSrc: Temporal   SpO2: 97%   Weight: (!) 502 lb (227.7 kg)   Height: 5' 2\" (1.575 m)   PainSc:   0 - No pain     BMI 91.82 kg/m²       General appearance  alert, cooperative, no distress, appears stated age   Head  Normocephalic, without obvious abnormality, atraumatic   Eyes  conjunctivae/corneas clear. PERRL, EOM's intact. Ears  normal TM's and external ear canals AU   Nose Nares normal. Septum midline. Mucosa normal. No drainage or sinus tenderness. Throat Lips, mucosa, and tongue normal. Teeth and gums normal   Neck supple, symmetrical, trachea midline, no adenopathy, thyroid: not enlarged, symmetric, no tenderness/mass/nodules   Back   symmetric, no curvature. ROM normal. No CVA tenderness   Lungs   clear to auscultation bilaterally   Breasts  Not examined   Heart  regular rate and rhythm, S1, S2 normal, no murmur, click, rub or gallop   Abdomen   soft, non-tender. Bowel sounds normal. No masses,  No organomegaly   Pelvic Deferred   Extremities extremities normal, atraumatic, no cyanosis or edema   Pulses 2+ and symmetric   Skin Skin color, texture, turgor normal. No rashes or lesions   Lymph nodes Cervical, supraclavicular, and axillary nodes normal.   Neurologic Normal       LABS     TESTS  9/29/2021 pelvic ultrasound  Intrauterine pregnancy 13 weeks 1 day    Assessment/Plan:      1. 15 weeks gestation of pregnancy  Start prenatal vitamin; f/u with gynecologist  - prenatal vit-iron fumarate-fa (PRENATAL PLUS with IRON) 28 mg iron- 800 mcg tab; Take 1 Tablet by mouth daily. Dispense: 100 Tablet; Refill: 12    2. Essential hypertension  Goal <130/80  - verapamil ER (VERELAN PM) 200 mg capsule; Take 1 Capsule by mouth nightly. Dispense: 90 Capsule; Refill: 3    3. Morbid obesity with BMI of 70 and over, adult ([facility] [address]  I have reviewed/discussed the above normal BMI with the patient. I have recommended the following interventions: dietary management education, guidance, and counseling and encourage exercise . Caitie Arreola 4. Encounter for hepatitis C screening test for low risk patient    - HEPATITIS C AB; Future    5.  Impaired fasting glucose    - HEMOGLOBIN A1C WITH EAG; Future    6. Needs flu shot    - INFLUENZA VIRUS VAC QUAD,SPLIT,PRESV FREE SYRINGE IM  - ADMIN INFLUENZA VIRUS VAC    7. Unwanted pregnancy  [facility]  - prenatal vit-iron fumarate-fa (PRENATAL PLUS with IRON) 28 mg iron- 800 mcg tab; Take 1 Tablet by mouth daily. Dispense: 100 Tablet; Refill: 12    Lab review: orders written for new lab studies as appropriate; see orders      I have discussed the diagnosis with the patient and the intended plan as seen in the above orders. The patient has received an after-visit summary and questions were answered concerning future plans. I have discussed medication side effects and warnings with the patient as well. I have reviewed the plan of care with the patient, accepted their input and they are in agreement with the treatment goals.

## 2021-10-13 LAB
EST. AVERAGE GLUCOSE BLD GHB EST-MCNC: 128 MG/DL
HBA1C MFR BLD: 6.1 % (ref 4.8–5.6)
HCV AB S/CO SERPL IA: <0.1 S/CO RATIO (ref 0–0.9)

## 2022-03-18 PROBLEM — E66.01 MORBID OBESITY WITH BMI OF 70 AND OVER, ADULT (HCC): Status: ACTIVE | Noted: 2017-02-21

## 2022-03-18 PROBLEM — Z71.89 ADVANCE CARE PLANNING: Status: ACTIVE | Noted: 2017-02-21

## 2022-03-18 PROBLEM — R73.03 PREDIABETES: Status: ACTIVE | Noted: 2017-02-21

## 2022-03-19 PROBLEM — I10 ESSENTIAL HYPERTENSION: Status: ACTIVE | Noted: 2017-02-07

## 2022-05-02 ENCOUNTER — VIRTUAL VISIT (OUTPATIENT)
Dept: FAMILY MEDICINE CLINIC | Age: 32
End: 2022-05-02
Payer: MEDICARE

## 2022-05-02 DIAGNOSIS — Z99.81 SUPPLEMENTAL OXYGEN DEPENDENT: ICD-10-CM

## 2022-05-02 DIAGNOSIS — U09.9 COVID-19 LONG HAULER MANIFESTING CHRONIC COUGH: Primary | ICD-10-CM

## 2022-05-02 DIAGNOSIS — R53.81 PHYSICAL DECONDITIONING: ICD-10-CM

## 2022-05-02 DIAGNOSIS — R05.3 COVID-19 LONG HAULER MANIFESTING CHRONIC COUGH: Primary | ICD-10-CM

## 2022-05-02 DIAGNOSIS — E66.01 MORBID OBESITY WITH BMI OF 70 AND OVER, ADULT (HCC): ICD-10-CM

## 2022-05-02 PROCEDURE — 99214 OFFICE O/P EST MOD 30 MIN: CPT | Performed by: FAMILY MEDICINE

## 2022-05-02 PROCEDURE — G8756 NO BP MEASURE DOC: HCPCS | Performed by: FAMILY MEDICINE

## 2022-05-02 PROCEDURE — G8427 DOCREV CUR MEDS BY ELIG CLIN: HCPCS | Performed by: FAMILY MEDICINE

## 2022-05-02 PROCEDURE — G8432 DEP SCR NOT DOC, RNG: HCPCS | Performed by: FAMILY MEDICINE

## 2022-05-02 NOTE — PROGRESS NOTES
Marin Frank is a 28 y.o.  female and presents with    Chief Complaint   Patient presents with    Leg Pain     left    Hypertension     Marin Frank, who was evaluated through a synchronous (real-time) audio-video encounter, and/or her healthcare decision maker, is aware that it is a billable service, which includes applicable co-pays, with coverage as determined by her insurance carrier. She provided verbal consent to proceed and patient identification was verified. This visit was conducted pursuant to the emergency declaration under the [facility], [address] [place] [address] authority and the [employer] Act. A caregiver was present when appropriate. Ability to conduct physical exam was limited. The patient was located at home in a state where the provider was licensed to provide care. --Bartolome Juarez MD on 5/2/2022 at 5:01 PM    Subjective:  Pt is following up after covid infection 4 months ago; Her infant was delivered via emergency C section 1/5/2022. Her infant is currently in foster care. She has long term shortness of breath with decreased endurance. She has chronic cough. She has pain in her left leg. Cardiovascular Review:  The patient has hypertension and obesity. Diet and Lifestyle: not attempting to follow a low fat, low cholesterol diet, not attempting to follow a low sodium diet, does not rigorously follow a diabetic diet, sedentary, nonsmoker  Home BP Monitoring: is not measured at home. Pertinent ROS: taking medications as instructed, no medication side effects noted, no TIA's, no chest pain on exertion, no dyspnea on exertion, no swelling of ankles.      ROS   General ROS: negative for - chills or fever; + weight loss  Ophthalmic ROS: positive for - uses glasses  ENT ROS: negative for - nasal congestion or sore throat  Endocrine ROS: negative for - polydipsia/polyuria or temperature intolerance  Respiratory ROS: + cough, shortness of breath, or wheezing  Cardiovascular ROS: no chest pain or dyspnea on exertion  Gastrointestinal ROS: no abdominal pain, change in bowel habits, or black or bloody stools  Genito-Urinary ROS:  No dysuria or increased frequency  Musculoskeletal ROS: negative for - joint pain or muscle pain  Neurological ROS: negative for - numbness/tingling or weakness  Dermatological ROS: negative for - rash or skin lesion changes    All other systems reviewed and are negative. Objective: There were no vitals filed for this visit. alert, well appearing, and in no distress, oriented to person, place, and time and morbidly obese  Mental status - normal mood, behavior, speech, dress, motor activity, and thought processes  Chest - normal work of breathing  Neurological - cranial nerves II through XII intact      LABS     TESTS      Assessment/Plan:    1. COVID-19 long hauler manifesting chronic cough  Refer for further evaluation   [facility]    2. Physical deconditioning  Refer for physical therapy  [facility]    3. Morbid obesity with BMI of 70 and over, adult ([facility] [address]  I have reviewed/discussed the above normal BMI with the patient. I have recommended the following interventions: dietary management education, guidance, and counseling and monitor weight . .      - [facility]      Lab review: no lab studies available for review at time of visit      I have discussed the diagnosis with the patient and the intended plan as seen in the above orders. I have discussed medication side effects and warnings with the patient as well. I have reviewed the plan of care with the patient, accepted their input and they are in agreement with the treatment goals.

## 2022-05-02 NOTE — PROGRESS NOTES
Gabrielle Dumont presents today for   Chief Complaint   Patient presents with    Leg Pain     left    Hypertension       Is someone accompanying this pt? na    Is the patient using any DME equipment during OV? na    Depression Screening:  3 most recent PHQ Screens 4/14/2021   Little interest or pleasure in doing things Not at all   Feeling down, depressed, irritable, or hopeless Not at all   Total Score PHQ 2 0   Trouble falling or staying asleep, or sleeping too much -   Feeling tired or having little energy -   Poor appetite, weight loss, or overeating -   Feeling bad about yourself - or that you are a failure or have let yourself or your family down -   Trouble concentrating on things such as school, work, reading, or watching TV -   Moving or speaking so slowly that other people could have noticed; or the opposite being so fidgety that others notice -   Thoughts of being better off dead, or hurting yourself in some way -   PHQ 9 Score -   How difficult have these problems made it for you to do your work, take care of your home and get along with others -       Learning Assessment:  Learning Assessment 4/27/2017   PRIMARY LEARNER Patient   CO-LEARNER CAREGIVER No   PRIMARY LANGUAGE ENGLISH   LEARNER PREFERENCE PRIMARY DEMONSTRATION   ANSWERED BY Patient   RELATIONSHIP SELF       Abuse Screening:  Abuse Screening Questionnaire 6/6/2019   Do you ever feel afraid of your partner? N   Are you in a relationship with someone who physically or mentally threatens you? N   Is it safe for you to go home? Y       Fall Risk  Fall Risk Assessment, last 12 mths 6/6/2019   Able to walk? Yes   Fall in past 12 months? No       Health Maintenance reviewed and discussed and ordered per Provider.     Health Maintenance Due   Topic Date Due    COVID-19 Vaccine (1) Never done    DTaP/Tdap/Td series (1 - Tdap) Never done    Cervical cancer screen  Never done    Medicare Yearly Exam  12/10/2021    Depression Screen 04/14/2022   . Coordination of Care:  1. Have you been to the ER, urgent care clinic since your last visit? Hospitalized since your last visit? no    2. Have you seen or consulted any other health care providers outside of the [address] [place] [address] since your last visit? Include any pap smears or colon screening.  no      Last  Checked na  Last UDS Checked na  Last Pain contract signed: na    Patients concerns today:  Left leg pain

## 2022-08-23 ENCOUNTER — VIRTUAL VISIT (OUTPATIENT)
Dept: FAMILY MEDICINE CLINIC | Age: 32
End: 2022-08-23
Payer: MEDICARE

## 2022-08-23 DIAGNOSIS — I10 ESSENTIAL HYPERTENSION: ICD-10-CM

## 2022-08-23 DIAGNOSIS — E28.2 POLYCYSTIC OVARIAN SYNDROME: ICD-10-CM

## 2022-08-23 DIAGNOSIS — R73.01 IMPAIRED FASTING GLUCOSE: Primary | ICD-10-CM

## 2022-08-23 PROCEDURE — 99213 OFFICE O/P EST LOW 20 MIN: CPT | Performed by: FAMILY MEDICINE

## 2022-08-23 PROCEDURE — G8427 DOCREV CUR MEDS BY ELIG CLIN: HCPCS | Performed by: FAMILY MEDICINE

## 2022-08-23 PROCEDURE — G8432 DEP SCR NOT DOC, RNG: HCPCS | Performed by: FAMILY MEDICINE

## 2022-08-23 PROCEDURE — G8756 NO BP MEASURE DOC: HCPCS | Performed by: FAMILY MEDICINE

## 2022-08-23 RX ORDER — NORGESTIMATE AND ETHINYL ESTRADIOL 7DAYSX3 LO
1 KIT ORAL DAILY
Qty: 84 TABLET | Refills: 3 | Status: SHIPPED | OUTPATIENT
Start: 2022-08-23

## 2022-08-23 NOTE — PROGRESS NOTES
Lenka Solano presents today for   Chief Complaint   Patient presents with    Irregular Menses       Is someone accompanying this pt? na    Is the patient using any DME equipment during OV? na    Depression Screening:  3 most recent PHQ Screens 4/14/2021   Little interest or pleasure in doing things Not at all   Feeling down, depressed, irritable, or hopeless Not at all   Total Score PHQ 2 0   Trouble falling or staying asleep, or sleeping too much -   Feeling tired or having little energy -   Poor appetite, weight loss, or overeating -   Feeling bad about yourself - or that you are a failure or have let yourself or your family down -   Trouble concentrating on things such as school, work, reading, or watching TV -   Moving or speaking so slowly that other people could have noticed; or the opposite being so fidgety that others notice -   Thoughts of being better off dead, or hurting yourself in some way -   PHQ 9 Score -   How difficult have these problems made it for you to do your work, take care of your home and get along with others -       Learning Assessment:  Learning Assessment 4/27/2017   PRIMARY LEARNER Patient   CO-LEARNER CAREGIVER No   PRIMARY LANGUAGE ENGLISH   LEARNER PREFERENCE PRIMARY DEMONSTRATION   ANSWERED BY Patient   RELATIONSHIP SELF       Abuse Screening:  Abuse Screening Questionnaire 6/6/2019   Do you ever feel afraid of your partner? N   Are you in a relationship with someone who physically or mentally threatens you? N   Is it safe for you to go home? Y       Fall Risk  Fall Risk Assessment, last 12 mths 6/6/2019   Able to walk? Yes   Fall in past 12 months? No       Health Maintenance reviewed and discussed and ordered per Provider. Health Maintenance Due   Topic Date Due    COVID-19 Vaccine (1) Never done    DTaP/Tdap/Td series (1 - Tdap) Never done    Cervical cancer screen  Never done    Medicare Yearly Exam  12/10/2021    Depression Screen  04/14/2022   .       Coordination of Care:  1. Have you been to the ER, urgent care clinic since your last visit? Hospitalized since your last visit? no    2. Have you seen or consulted any other health care providers outside of the [address] [place] [address] since your last visit? Include any pap smears or colon screening. no      Last  Checked na  Last UDS Checked na  Last Pain contract signed: na    Patients concerns today:  family planning.

## 2022-08-23 NOTE — PROGRESS NOTES
Elio Lei is a 33 y.o.  female and presents with    Chief Complaint   Patient presents with    Irregular Menses     Elio Lei, who was evaluated through a synchronous (real-time) audio-video encounter, and/or her healthcare decision maker, is aware that it is a billable service, which includes applicable co-pays, with coverage as determined by her insurance carrier. She provided verbal consent to proceed and patient identification was verified. This visit was conducted pursuant to the emergency declaration under the [facility], [address] [place] [address] authority and the [email or web address] Act. A caregiver was present when appropriate. Ability to conduct physical exam was limited. The patient was located at: Home: [address] [place] [address]  The provider was located at: Facility (Appt Department): [address] [place] [address] 4  [address]  [phone]    --Lorri Trotter MD on 8/23/2022 at 4:39 PM    Subjective:  Ms. Neo Dobson presents with recent period. Period started on the 8th of August and ended the 12th. This was the first period post partum 7 months  Cardiovascular Review:  The patient has hypertension and obesity. Diet and Lifestyle: not attempting to follow a low fat, low cholesterol diet, not attempting to follow a low sodium diet, does not rigorously follow a diabetic diet, sedentary, nonsmoker  Home BP Monitoring: is not measured at home. Pertinent ROS: taking medications as instructed, no medication side effects noted, no TIA's, no chest pain on exertion, no dyspnea on exertion, no swelling of ankles.       ROS   General ROS: negative for - chills or fever; + weight loss  Ophthalmic ROS: positive for - uses glasses  ENT ROS: negative for - nasal congestion or sore throat  Endocrine ROS: negative for - polydipsia/polyuria or temperature intolerance  Respiratory ROS: no cough, shortness of breath, or wheezing  Cardiovascular ROS: no chest pain or dyspnea on exertion  Gastrointestinal ROS: no abdominal pain, change in bowel habits, or black or bloody stools  Genito-Urinary ROS:  No dysuria or increased frequency  Musculoskeletal ROS: negative for - joint pain or muscle pain  Neurological ROS: negative for - numbness/tingling or weakness  Dermatological ROS: negative for - rash or skin lesion changes     All other systems reviewed and are negative. Objective: There were no vitals filed for this visit. alert, well appearing, and in no distress, oriented to person, place, and time and morbidly obese  Mental status - normal mood, behavior, speech, dress, motor activity, and thought processes  Chest - normal work of breathing  Neurological - cranial nerves II through XII intact    LABS     TESTS      Assessment/Plan:    1. Polycystic ovarian syndrome  Start ocp to regulate menses  - norgestimate-ethinyl estradioL (ORTHO TRI-CYCLEN LO) 0.18/0.215/0.25 mg-25 mcg tab; Take 1 Tablet by mouth daily. Dispense: 84 Tablet; Refill: 3    2. Impaired fasting glucose  Encourage low carb diet    3. Essential hypertension  Goal <130/80; continue verapamil     Lab review: no lab studies available for review at time of visit      I have discussed the diagnosis with the patient and the intended plan as seen in the above orders. I have discussed medication side effects and warnings with the patient as well. I have reviewed the plan of care with the patient, accepted their input and they are in agreement with the treatment goals.

## 2022-12-12 ENCOUNTER — OFFICE VISIT (OUTPATIENT)
Dept: FAMILY MEDICINE CLINIC | Age: 32
End: 2022-12-12
Payer: MEDICARE

## 2022-12-12 VITALS
WEIGHT: 293 LBS | DIASTOLIC BLOOD PRESSURE: 70 MMHG | OXYGEN SATURATION: 94 % | TEMPERATURE: 98.8 F | RESPIRATION RATE: 16 BRPM | SYSTOLIC BLOOD PRESSURE: 138 MMHG | HEART RATE: 100 BPM | BODY MASS INDEX: 53.92 KG/M2 | HEIGHT: 62 IN

## 2022-12-12 DIAGNOSIS — R73.9 HYPERGLYCEMIA: ICD-10-CM

## 2022-12-12 DIAGNOSIS — Z00.00 MEDICARE ANNUAL WELLNESS VISIT, SUBSEQUENT: ICD-10-CM

## 2022-12-12 DIAGNOSIS — J18.9 COMMUNITY ACQUIRED PNEUMONIA, UNSPECIFIED LATERALITY: ICD-10-CM

## 2022-12-12 DIAGNOSIS — E28.2 POLYCYSTIC OVARIAN SYNDROME: ICD-10-CM

## 2022-12-12 DIAGNOSIS — R73.01 IMPAIRED FASTING GLUCOSE: ICD-10-CM

## 2022-12-12 DIAGNOSIS — E66.01 MORBID OBESITY WITH BMI OF 70 AND OVER, ADULT (HCC): ICD-10-CM

## 2022-12-12 DIAGNOSIS — I10 ESSENTIAL HYPERTENSION: Primary | ICD-10-CM

## 2022-12-12 PROCEDURE — G0439 PPPS, SUBSEQ VISIT: HCPCS | Performed by: FAMILY MEDICINE

## 2022-12-12 PROCEDURE — G8427 DOCREV CUR MEDS BY ELIG CLIN: HCPCS | Performed by: FAMILY MEDICINE

## 2022-12-12 PROCEDURE — 3074F SYST BP LT 130 MM HG: CPT | Performed by: FAMILY MEDICINE

## 2022-12-12 PROCEDURE — G8417 CALC BMI ABV UP PARAM F/U: HCPCS | Performed by: FAMILY MEDICINE

## 2022-12-12 PROCEDURE — G8432 DEP SCR NOT DOC, RNG: HCPCS | Performed by: FAMILY MEDICINE

## 2022-12-12 PROCEDURE — 3078F DIAST BP <80 MM HG: CPT | Performed by: FAMILY MEDICINE

## 2022-12-12 PROCEDURE — 99214 OFFICE O/P EST MOD 30 MIN: CPT | Performed by: FAMILY MEDICINE

## 2022-12-12 RX ORDER — ALBUTEROL SULFATE 90 UG/1
1 AEROSOL, METERED RESPIRATORY (INHALATION)
Qty: 18 G | Refills: 1 | Status: SHIPPED | OUTPATIENT
Start: 2022-12-12

## 2022-12-12 RX ORDER — DOXYCYCLINE 100 MG/1
100 CAPSULE ORAL 2 TIMES DAILY
Qty: 20 CAPSULE | Refills: 0 | Status: SHIPPED | OUTPATIENT
Start: 2022-12-12 | End: 2022-12-22

## 2022-12-12 RX ORDER — VERAPAMIL HYDROCHLORIDE 200 MG/1
200 CAPSULE, EXTENDED RELEASE ORAL
Qty: 90 CAPSULE | Refills: 3 | Status: SHIPPED | OUTPATIENT
Start: 2022-12-12

## 2022-12-12 NOTE — PROGRESS NOTES
Santosh Fields is a 33 y.o. presents today for   Chief Complaint   Patient presents with    Cough    Shortness of Breath    Knee Pain    Sore Throat    Urinary Frequency    Sleep Problem     Is someone accompanying this pt? No    Is the patient using any DME equipment during OV? No    There were no vitals taken for this visit. Depression Screening:   3 most recent PHQ Screens 12/12/2022   Little interest or pleasure in doing things Not at all   Feeling down, depressed, irritable, or hopeless Not at all   Total Score PHQ 2 0   Trouble falling or staying asleep, or sleeping too much -   Feeling tired or having little energy -   Poor appetite, weight loss, or overeating -   Feeling bad about yourself - or that you are a failure or have let yourself or your family down -   Trouble concentrating on things such as school, work, reading, or watching TV -   Moving or speaking so slowly that other people could have noticed; or the opposite being so fidgety that others notice -   Thoughts of being better off dead, or hurting yourself in some way -   PHQ 9 Score -   How difficult have these problems made it for you to do your work, take care of your home and get along with others -       Health Maintenance: reviewed and discussed and ordered per Provider. Health Maintenance Due   Topic Date Due    COVID-19 Vaccine (1) Never done    DTaP/Tdap/Td series (1 - Tdap) Never done    Cervical cancer screen  Never done    Medicare Yearly Exam  12/10/2021    Depression Screen  04/14/2022    Flu Vaccine (1) 08/01/2022    A1C test (Diabetic or Prediabetic)  10/12/2022         Coordination of Care:   1. \"Have you been to the ER, urgent care clinic since your last visit? Hospitalized since your last visit? \" No    2. \"Have you seen or consulted any other health care providers outside of the [address] [place] [address] since your last visit? \" No     3. For patients aged 39-70: Has the patient had a colonoscopy / FIT/ Cologuard?  NA - based on age    If the patient is female:    4. For patients aged 41-77: Has the patient had a mammogram within the past 2 years? NA - based on age or sex    11. For patients aged 21-65: Has the patient had a pap smear? No     Advanced Directive:  1. Do you have an Advanced Directive? No     2. Would you like information on Advanced Directives?  No    Jie Cloud CMA

## 2022-12-13 LAB
ALBUMIN SERPL-MCNC: 4.1 G/DL (ref 3.8–4.8)
ALBUMIN/GLOB SERPL: 0.9 {RATIO} (ref 1.2–2.2)
ALP SERPL-CCNC: 112 IU/L (ref 44–121)
ALT SERPL-CCNC: 10 IU/L (ref 0–32)
AST SERPL-CCNC: 19 IU/L (ref 0–40)
BASOPHILS # BLD AUTO: 0 X10E3/UL (ref 0–0.2)
BASOPHILS NFR BLD AUTO: 1 %
BILIRUB SERPL-MCNC: <0.2 MG/DL (ref 0–1.2)
BUN SERPL-MCNC: 16 MG/DL (ref 6–20)
BUN/CREAT SERPL: 17 (ref 9–23)
CALCIUM SERPL-MCNC: 9.6 MG/DL (ref 8.7–10.2)
CHLORIDE SERPL-SCNC: 99 MMOL/L (ref 96–106)
CHOLEST SERPL-MCNC: 256 MG/DL (ref 100–199)
CO2 SERPL-SCNC: 20 MMOL/L (ref 20–29)
CREAT SERPL-MCNC: 0.95 MG/DL (ref 0.57–1)
EGFR: 82 ML/MIN/1.73
EOSINOPHIL # BLD AUTO: 0.6 X10E3/UL (ref 0–0.4)
EOSINOPHIL NFR BLD AUTO: 11 %
ERYTHROCYTE [DISTWIDTH] IN BLOOD BY AUTOMATED COUNT: 12.6 % (ref 11.7–15.4)
EST. AVERAGE GLUCOSE BLD GHB EST-MCNC: 123 MG/DL
GLOBULIN SER CALC-MCNC: 4.4 G/DL (ref 1.5–4.5)
GLUCOSE SERPL-MCNC: 74 MG/DL (ref 70–99)
HBA1C MFR BLD: 5.9 % (ref 4.8–5.6)
HCT VFR BLD AUTO: 42.9 % (ref 34–46.6)
HDLC SERPL-MCNC: 96 MG/DL
HGB BLD-MCNC: 13.2 G/DL (ref 11.1–15.9)
IMM GRANULOCYTES # BLD AUTO: 0 X10E3/UL (ref 0–0.1)
IMM GRANULOCYTES NFR BLD AUTO: 0 %
IMP & REVIEW OF LAB RESULTS: NORMAL
LDLC SERPL CALC-MCNC: 149 MG/DL (ref 0–99)
LYMPHOCYTES # BLD AUTO: 1.9 X10E3/UL (ref 0.7–3.1)
LYMPHOCYTES NFR BLD AUTO: 35 %
MCH RBC QN AUTO: 26 PG (ref 26.6–33)
MCHC RBC AUTO-ENTMCNC: 30.8 G/DL (ref 31.5–35.7)
MCV RBC AUTO: 85 FL (ref 79–97)
MONOCYTES # BLD AUTO: 0.5 X10E3/UL (ref 0.1–0.9)
MONOCYTES NFR BLD AUTO: 10 %
NEUTROPHILS # BLD AUTO: 2.3 X10E3/UL (ref 1.4–7)
NEUTROPHILS NFR BLD AUTO: 43 %
PLATELET # BLD AUTO: 437 X10E3/UL (ref 150–450)
POTASSIUM SERPL-SCNC: ABNORMAL MMOL/L
PROT SERPL-MCNC: 8.5 G/DL (ref 6–8.5)
RBC # BLD AUTO: 5.07 X10E6/UL (ref 3.77–5.28)
SODIUM SERPL-SCNC: 135 MMOL/L (ref 134–144)
TRIGL SERPL-MCNC: 67 MG/DL (ref 0–149)
VLDLC SERPL CALC-MCNC: 11 MG/DL (ref 5–40)
WBC # BLD AUTO: 5.3 X10E3/UL (ref 3.4–10.8)

## 2022-12-30 NOTE — PROGRESS NOTES
(AWV) The Medicare Annual Wellness Exam PROGRESS NOTE    This is a Medicare Annual Wellness Exam (AWV)     I have reviewed the patient's medical history in detail and updated the computerized patient record. Pauline Smith is a 33 y.o.  female and presents for an annual wellness exam     ROS   General ROS: negative for - chills or fever; + weight loss  Ophthalmic ROS: positive for - uses glasses  ENT ROS: negative for - nasal congestion or sore throat  Endocrine ROS: negative for - polydipsia/polyuria or temperature intolerance  Respiratory ROS: + cough, shortness of breath, or wheezing  Cardiovascular ROS: no chest pain or dyspnea on exertion  Gastrointestinal ROS: no abdominal pain, change in bowel habits, or black or bloody stools  Genito-Urinary ROS:  No dysuria or increased frequency  Musculoskeletal ROS: negative for - joint pain or muscle pain  Neurological ROS: negative for - numbness/tingling or weakness  Dermatological ROS: negative for - rash or skin lesion changes    All other systems reviewed and are negative. History     Past Medical History:   Diagnosis Date    HTN (hypertension)     Morbid obesity with BMI of 70 and over, adult ([facility]       No past surgical history on file. Current Outpatient Medications   Medication Sig Dispense Refill    verapamil ER (VERELAN PM) 200 mg capsule Take 1 Capsule by mouth nightly. 90 Capsule 3    albuterol (PROVENTIL HFA, VENTOLIN HFA, PROAIR HFA) 90 mcg/actuation inhaler Take 1 Puff by inhalation every six (6) hours as needed for Wheezing. 18 g 1    norgestimate-ethinyl estradioL (ORTHO TRI-CYCLEN LO) 0.18/0.215/0.25 mg-25 mcg tab Take 1 Tablet by mouth daily. 84 Tablet 3    prenatal vit-iron fumarate-fa (PRENATAL PLUS with IRON) 28 mg iron- 800 mcg tab Take 1 Tablet by mouth daily.  100 Tablet 12    Insulin Needles, Disposable, 31 gauge x 5/16\" ndle Use needle with pen once a day 30 Pen Needle 3    ergocalciferol (ERGOCALCIFEROL) 50,000 unit capsule Take 1 Cap by mouth every seven (7) days. 4 Cap 11    ferrous sulfate (IRON) 325 mg (65 mg iron) EC tablet Take 1 Tab by mouth Daily (before breakfast). 30 Tab 2     Allergies   Allergen Reactions    Phentermine Angioedema     Family History   Problem Relation Age of Onset    Hypertension Mother     No Known Problems Father      Social History     Tobacco Use    Smoking status: Never    Smokeless tobacco: Never   Substance Use Topics    Alcohol use: Not on file     Patient Active Problem List   Diagnosis Code    Essential hypertension I10    Prediabetes R73.03    Morbid obesity with BMI of 70 and over, adult ([facility] E66.01, Z68.45    Advance care planning Z71.89       Health Maintenance History  Immunizations reviewed, dtap up to date, pneumovax up to date, flu due  colonoscopy:n/a,   Eye exam: up to date    Depression Risk Factor Screening:      Patient Health Questionnaire (PHQ-2)   Over the last 2 weeks, how often have you been bothered by any of the following problems? Little interest or pleasure in doing things? Not at all. [0]  Feeling down, depressed, or hopeless? Not at all. [0]    Total Score: 0/6  PHQ-2 Assessment Scoring:   A score of 2 or more requires further screening with the PHQ-9    Alcohol Risk Factor Screening:     Women: On any occasion during the past 3 months, have you had more than 3 drinks containing alcohol? no   Do you average more than 7 drinks per week? no    Functional Ability and Level of Safety:     Hearing Loss    Hearing is good. Activities of Daily Living   Self-care.    Requires assistance with: no ADLs    Fall Risk   Secondary diagnoses (15 pts)  Score: 15    Abuse Screen   Patient is not abused    Examination   Physical Examination  Vitals:    12/12/22 1439 12/12/22 1444   BP: (!) 158/100 138/70   Pulse: 100    Resp: 16    Temp: 98.8 °F (37.1 °C)    TempSrc: Temporal    SpO2: 94%    Weight: (!) 504 lb (228.6 kg)    Height: 5' 2\" (1.575 m)       Body mass index is 92.18 kg/m². Evaluation of Cognitive Function:  Mood/affect:good mood  Appearance: well kempt  Family member/caregiver input:n/a    alert, well appearing, and in no distress, oriented to person, place, and time, and morbidly obese    Patient Care Team:  Samantha Mota MD as PCP - General (Family Medicine)  Samantha Mota MD as PCP - [facility] Empaneled Provider  Hu Todd MD (General Surgery)    End-of-life planning  Advanced Directive in the case than an injury or illness causes the patient to be unable to make health care decisions    Health Care Directive or Living Will: no    Advice/Referrals/Counselling/Plan:   Education and counseling provided:  Influenza Vaccine  Diabetes screening test  Include in education list (weight loss, physical activity, smoking cessation, fall prevention, and nutrition)    ICD-10-CM ICD-9-CM    1. Essential hypertension  I10 401.9 verapamil ER (VERELAN PM) 200 mg capsule      METABOLIC PANEL, COMPREHENSIVE      METABOLIC PANEL, COMPREHENSIVE      2. Medicare annual wellness visit, subsequent  Z00.00 V70.0       3. Community acquired pneumonia, unspecified laterality  J18.9 486 doxycycline (VIBRAMYCIN) 100 mg capsule      albuterol (PROVENTIL HFA, VENTOLIN HFA, PROAIR HFA) 90 mcg/actuation inhaler      4. Impaired fasting glucose  R73.01 790.21       5. Polycystic ovarian syndrome  E28.2 256.4       6. Morbid obesity with BMI of 70 and over, adult ([facility]  E66.01 278.01 CBC WITH AUTOMATED DIFF    Z68.45 V85.45 HEMOGLOBIN A1C WITH EAG      LIPID PANEL      LIPID PANEL      HEMOGLOBIN A1C WITH EAG      CBC WITH AUTOMATED DIFF      7. Hyperglycemia  R73.9 790.29 HEMOGLOBIN A1C WITH EAG      HEMOGLOBIN A1C WITH EAG      . Brief written plan, checklist    I have discussed the diagnosis with the patient and the intended plan as seen in the above orders. The patient has received an after-visit summary and questions were answered concerning future plans.   I have discussed medication side effects and warnings with the patient as well. I have reviewed the plan of care with the patient, accepted their input and they are in agreement with the treatment goals. Follow-up and Dispositions    Return in about 6 months (around 6/12/2023), or if symptoms worsen or fail to improve, for medication evaluation, results. ____________________________________________________________    Problem Assessment    for treatment of   Chief Complaint   Patient presents with    Cough    Shortness of Breath    Knee Pain    Sore Throat    Urinary Frequency    Sleep Problem         SUBJECTIVE    Well Adult Physical   Patient here for a comprehensive physical exam.The patient reports problems - cough and shortness of breath, morbid obesity, htn  Do you take any herbs or supplements that were not prescribed by a doctor? no Are you taking calcium supplements? no Are you taking aspirin daily? not applicable    Cardiovascular Review:  The patient has hypertension and obesity. Diet and Lifestyle: not attempting to follow a low fat, low cholesterol diet, not attempting to follow a low sodium diet, does not rigorously follow a diabetic diet, sedentary, nonsmoker  Home BP Monitoring: is not measured at home. Pertinent ROS: taking medications as instructed, no medication side effects noted, no TIA's, no chest pain on exertion, no dyspnea on exertion, no swelling of ankles. Visit Vitals  /70 (BP 1 Location: Left upper arm, BP Patient Position: Sitting, BP Cuff Size: Large adult)   Pulse 100   Temp 98.8 °F (37.1 °C) (Temporal)   Resp 16   Ht 5' 2\" (1.575 m)   Wt (!) 504 lb (228.6 kg)   SpO2 94%   BMI 92.18 kg/m²     General:  Alert, cooperative, no distress, appears stated age; morbidly obese. Head:  Normocephalic, without obvious abnormality, atraumatic. Eyes:  Conjunctivae/corneas clear. PERRL, EOMs intact. Ears:  Normal TMs and external ear canals both ears.    Nose: Nares normal. Septum midline. Mucosa normal. No drainage or sinus tenderness. Throat: Lips, mucosa, and tongue normal. Teeth and gums normal.   Neck: Supple, symmetrical, trachea midline, no adenopathy, thyroid: no enlargement/tenderness/nodules, no carotid bruit and no JVD. Back:   Symmetric, no curvature. ROM normal. No CVA tenderness. Lungs:   Diffuse rhonchi and wheezing   Chest wall:  No tenderness or deformity. Heart:  Regular rate and rhythm, S1, S2 normal, no murmur, click, rub or gallop. Breast Exam:  Not examined   Abdomen:   Soft, non-tender. Bowel sounds normal. No masses,  No organomegaly. Genitalia:  deferred   Rectal:  deferred   Extremities: Extremities normal, atraumatic, no cyanosis or edema. Pulses: 2+ and symmetric all extremities. Skin: Skin color, texture, turgor normal. No rashes or lesions. Lymph nodes: Cervical, supraclavicular, and axillary nodes normal.   Neurologic: CNII-XII intact. Normal strength, sensation and reflexes throughout. LABS     TESTS      Assessment/Plan:    1. Essential hypertension  Goal <130/80; assess renal function  - verapamil ER (VERELAN PM) 200 mg capsule; Take 1 Capsule by mouth nightly. Dispense: 90 Capsule; Refill: 3  - METABOLIC PANEL, COMPREHENSIVE; Future  - METABOLIC PANEL, COMPREHENSIVE    2. Medicare annual wellness visit, subsequent  Reviewed preventive recommendations    3. Community acquired pneumonia, unspecified laterality  Start abx   - doxycycline (VIBRAMYCIN) 100 mg capsule; Take 1 Capsule by mouth two (2) times a day for 10 days. Dispense: 20 Capsule; Refill: 0  - albuterol (PROVENTIL HFA, VENTOLIN HFA, PROAIR HFA) 90 mcg/actuation inhaler; Take 1 Puff by inhalation every six (6) hours as needed for Wheezing. Dispense: 18 g; Refill: 1    4. Impaired fasting glucose    5. Polycystic ovarian syndrome  Follow up with gynecology; metformin continued    6.  Morbid obesity with BMI of 70 and over, adult [facility])  I have reviewed/discussed the above normal BMI with the patient. I have recommended the following interventions: dietary management education, guidance, and counseling and encourage exercise . Diana Karst - CBC WITH AUTOMATED DIFF; Future  - HEMOGLOBIN A1C WITH EAG; Future  - LIPID PANEL; Future  - LIPID PANEL  - HEMOGLOBIN A1C WITH EAG  - CBC WITH AUTOMATED DIFF    7. Hyperglycemia  Assess for progression to diabetes; encourage low carb diet  - HEMOGLOBIN A1C WITH EAG;  Future  - HEMOGLOBIN A1C WITH EAG    Lab review: orders written for new lab studies as appropriate; see orders

## 2023-05-03 RX ORDER — NORGESTIMATE AND ETHINYL ESTRADIOL 7DAYSX3 LO
KIT ORAL
Qty: 84 TABLET | Refills: 0 | Status: SHIPPED | OUTPATIENT
Start: 2023-05-03

## 2023-07-13 ENCOUNTER — OFFICE VISIT (OUTPATIENT)
Facility: CLINIC | Age: 33
End: 2023-07-13
Payer: MEDICARE

## 2023-07-13 VITALS
DIASTOLIC BLOOD PRESSURE: 104 MMHG | BODY MASS INDEX: 53.92 KG/M2 | SYSTOLIC BLOOD PRESSURE: 152 MMHG | TEMPERATURE: 96.4 F | HEIGHT: 62 IN | WEIGHT: 293 LBS | HEART RATE: 100 BPM | OXYGEN SATURATION: 92 % | RESPIRATION RATE: 17 BRPM

## 2023-07-13 DIAGNOSIS — I10 ESSENTIAL HYPERTENSION: Primary | ICD-10-CM

## 2023-07-13 DIAGNOSIS — E66.01 MORBID OBESITY WITH BMI OF 70 AND OVER, ADULT (HCC): ICD-10-CM

## 2023-07-13 PROCEDURE — 3077F SYST BP >= 140 MM HG: CPT | Performed by: FAMILY MEDICINE

## 2023-07-13 PROCEDURE — 99214 OFFICE O/P EST MOD 30 MIN: CPT | Performed by: FAMILY MEDICINE

## 2023-07-13 PROCEDURE — 3080F DIAST BP >= 90 MM HG: CPT | Performed by: FAMILY MEDICINE

## 2023-07-13 RX ORDER — DULAGLUTIDE 1.5 MG/.5ML
1.5 INJECTION, SOLUTION SUBCUTANEOUS WEEKLY
Qty: 4 ADJUSTABLE DOSE PRE-FILLED PEN SYRINGE | Refills: 1 | Status: SHIPPED | OUTPATIENT
Start: 2023-07-13

## 2023-07-13 RX ORDER — BENAZEPRIL HYDROCHLORIDE AND HYDROCHLOROTHIAZIDE 20; 12.5 MG/1; MG/1
1 TABLET ORAL DAILY
Qty: 30 TABLET | Refills: 3 | Status: SHIPPED | OUTPATIENT
Start: 2023-07-13

## 2023-07-13 NOTE — PROGRESS NOTES
Romelia Stark is a 29 y.o. presents today for No chief complaint on file. Depression Screening:   PHQ-9 Questionaire 6/13/2023 12/12/2022   Little interest or pleasure in doing things 0 0   Feeling down, depressed, or hopeless 0 0   PHQ-9 Total Score 0 0       Abuse Screening: AMB Abuse Screening 6/13/2023   Do you ever feel afraid of your partner? N   Are you in a relationship with someone who physically or mentally threatens you? N   Is it safe for you to go home? Y       Learning Assessment:  No question data found. Fall Risk:  Fall Risk 6/13/2023   2 or more falls in past year? no   Fall with injury in past year? no           Coordination of Care:   1. \"Have you been to the ER, urgent care clinic since your last visit? Hospitalized since your last visit? \" no    2. \"Have you seen or consulted any other health care providers outside of the [address] [place] [address] since your last visit? \" no    3. For patients aged 43-73: Has the patient had a colonoscopy / FIT/ Cologuard? no    If the patient is female:    4. For patients aged 43-66: Has the patient had a mammogram within the past 2 years? no    5. For patients aged 21-65: Has the patient had a pap smear? no    Health Maintenance: reviewed and discussed and ordered per Provider.     Health Maintenance Due   Topic Date Due    COVID-19 Vaccine (1) Never done    Varicella vaccine (1 of 2 - 2-dose childhood series) Never done    DTaP/Tdap/Td vaccine (1 - Tdap) Never done    Cervical cancer screen  Never done

## 2023-08-18 ENCOUNTER — TELEPHONE (OUTPATIENT)
Facility: CLINIC | Age: 33
End: 2023-08-18

## 2023-08-18 DIAGNOSIS — T78.3XXA ANGIOEDEMA, INITIAL ENCOUNTER: Primary | ICD-10-CM

## 2023-08-18 DIAGNOSIS — I10 ESSENTIAL HYPERTENSION: ICD-10-CM

## 2023-08-18 RX ORDER — VALSARTAN AND HYDROCHLOROTHIAZIDE 80; 12.5 MG/1; MG/1
1 TABLET, FILM COATED ORAL DAILY
Qty: 30 TABLET | Refills: 5 | Status: SHIPPED | OUTPATIENT
Start: 2023-08-18

## 2023-08-18 NOTE — TELEPHONE ENCOUNTER
Patient called to inform Dr. Lorna Yeh that her face and lip are swollen. The swelling started yesterday. Offered to schedule an appointment, but patient stated she already has an appointment scheduled in September. Patient states she just wanted to inform Dr. Lorna Yeh. Thank you.

## 2023-08-31 DIAGNOSIS — N30.00 ACUTE CYSTITIS WITHOUT HEMATURIA: Primary | ICD-10-CM

## 2023-08-31 RX ORDER — SULFAMETHOXAZOLE AND TRIMETHOPRIM 800; 160 MG/1; MG/1
1 TABLET ORAL 2 TIMES DAILY
Qty: 10 TABLET | Refills: 0 | Status: SHIPPED | OUTPATIENT
Start: 2023-08-31 | End: 2023-09-05

## 2023-09-28 ENCOUNTER — OFFICE VISIT (OUTPATIENT)
Facility: CLINIC | Age: 33
End: 2023-09-28
Payer: MEDICARE

## 2023-09-28 VITALS
OXYGEN SATURATION: 92 % | HEART RATE: 108 BPM | BODY MASS INDEX: 53.92 KG/M2 | WEIGHT: 293 LBS | HEIGHT: 62 IN | DIASTOLIC BLOOD PRESSURE: 79 MMHG | SYSTOLIC BLOOD PRESSURE: 163 MMHG | RESPIRATION RATE: 18 BRPM | TEMPERATURE: 98.1 F

## 2023-09-28 DIAGNOSIS — Z23 NEEDS FLU SHOT: ICD-10-CM

## 2023-09-28 DIAGNOSIS — E66.01 MORBID OBESITY WITH BMI OF 70 AND OVER, ADULT (HCC): ICD-10-CM

## 2023-09-28 DIAGNOSIS — I10 ESSENTIAL HYPERTENSION: Primary | ICD-10-CM

## 2023-09-28 DIAGNOSIS — J30.1 SEASONAL ALLERGIC RHINITIS DUE TO POLLEN: ICD-10-CM

## 2023-09-28 PROCEDURE — 90674 CCIIV4 VAC NO PRSV 0.5 ML IM: CPT | Performed by: FAMILY MEDICINE

## 2023-09-28 PROCEDURE — 3078F DIAST BP <80 MM HG: CPT | Performed by: FAMILY MEDICINE

## 2023-09-28 PROCEDURE — 99214 OFFICE O/P EST MOD 30 MIN: CPT | Performed by: FAMILY MEDICINE

## 2023-09-28 PROCEDURE — G8417 CALC BMI ABV UP PARAM F/U: HCPCS | Performed by: FAMILY MEDICINE

## 2023-09-28 PROCEDURE — G0008 ADMIN INFLUENZA VIRUS VAC: HCPCS | Performed by: FAMILY MEDICINE

## 2023-09-28 PROCEDURE — G8427 DOCREV CUR MEDS BY ELIG CLIN: HCPCS | Performed by: FAMILY MEDICINE

## 2023-09-28 PROCEDURE — 3074F SYST BP LT 130 MM HG: CPT | Performed by: FAMILY MEDICINE

## 2023-09-28 PROCEDURE — 1036F TOBACCO NON-USER: CPT | Performed by: FAMILY MEDICINE

## 2023-09-28 RX ORDER — CETIRIZINE HYDROCHLORIDE 10 MG/1
10 TABLET ORAL DAILY
Qty: 30 TABLET | Refills: 5 | Status: SHIPPED | OUTPATIENT
Start: 2023-09-28

## 2023-09-28 NOTE — PROGRESS NOTES
Hellen Pollack is a 29 y.o. presents today for   Chief Complaint   Patient presents with    Other     MED REVIEW        Is someone accompanying this pt? NO    Is the patient using any DME equipment during OV? NO    Depression Screenin/13/2023     2:03 PM 2022     2:37 PM   PHQ-9 Questionaire   Little interest or pleasure in doing things 0 0   Feeling down, depressed, or hopeless 0 0   PHQ-9 Total Score 0 0       Abuse Screenin/13/2023     2:00 PM   AMB Abuse Screening   Do you ever feel afraid of your partner? N   Are you in a relationship with someone who physically or mentally threatens you? N   Is it safe for you to go home? Y       Learning Assessment:  No question data found. Fall Risk:      2023     2:17 PM   Fall Risk   2 or more falls in past year? no   Fall with injury in past year? no           Coordination of Care:   1. \"Have you been to the ER, urgent care clinic since your last visit? Hospitalized since your last visit? \" NO    2. \"Have you seen or consulted any other health care providers outside of the [address] [place] [address] since your last visit? \" NO    3. For patients aged 43-73: Has the patient had a colonoscopy / FIT/ Cologuard? NO    If the patient is female:    4. For patients aged 43-66: Has the patient had a mammogram within the past 2 years? NO    5. For patients aged 21-65: Has the patient had a pap smear? NO    Health Maintenance: reviewed and discussed and ordered per Provider.     Health Maintenance Due   Topic Date Due    Hepatitis B vaccine (1 of 3 - 3-dose series) Never done    COVID-19 Vaccine (1) Never done    Varicella vaccine (1 of 2 - 2-dose childhood series) Never done    DTaP/Tdap/Td vaccine (1 - Tdap) Never done    Cervical cancer screen  Never done    Flu vaccine (1) 2023        [facility] [place]  Phone: [phone]  Fax: [phone]

## 2023-10-26 ENCOUNTER — TELEPHONE (OUTPATIENT)
Dept: PHARMACY | Facility: CLINIC | Age: 33
End: 2023-10-26

## 2023-10-26 NOTE — TELEPHONE ENCOUNTER
[facility] CLINICAL PHARMACY: ADHERENCE REVIEW  Identified care gap per [employer]: fills at [facility] : ACE/ARB and Diabetes adherence    New to plan    SUPD    ASSESSMENT    ACE/ARB ADHERENCE    Insurance Records claims through 10/23/2023 (Prior Year [address] = not reported; YTD [address] = FIRST FILL; Potential Fail Date: 23):   VALSART/HCTZ TAB 80-12.5 last filled on 9/10/23 for 30 day supply. Next refill due: 10/10/23    Prescribed si tablet/capsule daily    PRACHI GILLESPIE MD    Per [address]: will get 90 day supply ready to  since past due. BP Readings from Last 3 Encounters:   23 (!) 163/79   23 (!) 152/104   23 (!) 172/90     CrCl cannot be calculated (Patient's most recent lab result is older than the maximum 180 days allowed. ). Lab Results   Component Value Date    CREATININE 0.95 2022     Lab Results   Component Value Date    K CANCELED 2022     DIABETES ADHERENCE    Insurance Records claims through 10/23/2023 (Prior Year [address] = not reported; YTD [address] = FIRST FILL; Potential Fail Date: 10/30/23): Trulicity last filled on 9/10/23 for 28 day supply. Next refill due: 10/8/23    Prescribed sig:  weekly    Per [address]: will get 28 day supply ready to  since past due. Lab Results   Component Value Date    LABA1C 5.9 (H) 2022    LABA1C 6.1 (H) 10/12/2021     NOTE: A1c not yet completed this calendar year    PLAN  Per insurer report, LIS-2 - may be able to receive up to a 100-day supply for the same cost as a 30-day supply    The following are interventions that have been identified:   Patient overdue refilling VALSART/HCTZ and Trulicity and active on home medication list.   Refill/s of both READY to  at patient's [address]    Attempting to reach patient to review. Left message asking for return call. and  refills      Last Visit: 23  Next Visit: none      Leah Ryan CPhT.    [facility] Clinical Support

## 2024-01-12 DIAGNOSIS — I10 ESSENTIAL HYPERTENSION: ICD-10-CM

## 2024-01-12 RX ORDER — VALSARTAN AND HYDROCHLOROTHIAZIDE 80; 12.5 MG/1; MG/1
1 TABLET, FILM COATED ORAL DAILY
Qty: 90 TABLET | Refills: 3 | Status: SHIPPED | OUTPATIENT
Start: 2024-01-12

## 2024-02-06 ENCOUNTER — TELEPHONE (OUTPATIENT)
Facility: CLINIC | Age: 34
End: 2024-02-06

## 2024-02-06 NOTE — TELEPHONE ENCOUNTER
----- Message from Delmy Baird sent at 2/6/2024  3:30 PM EST -----  Subject: Refill Request    QUESTIONS  Name of Medication? Other - trillostrintec birth control   Patient-reported dosage and instructions? 28mg? tri losprintce   How many days do you have left? 45  Preferred Pharmacy? [facility] [address]  Pharmacy phone number (if available)? [phone]  Additional Information for Provider? pt has 2more packs rx refill updated   ---------------------------------------------------------------------------  --------------,  Name of Medication? valsartan-hydroCHLOROthiazide (DIOVAN-HCT) 80-12.5 MG   per tablet  Patient-reported dosage and instructions? 12.5  How many days do you have left? Unknown  Preferred Pharmacy? [facility]  Pharmacy phone number (if available)? [phone]  Additional Information for Provider? 4 refills remaining just wanted to be   sure rx was up to date please call if any questions   ---------------------------------------------------------------------------  --------------  CALL BACK INFO  What is the best way for the office to contact you? OK to leave message on   voicemail  Preferred Call Back Phone Number? [phone]  ---------------------------------------------------------------------------  --------------  SCRIPT ANSWERS  Relationship to Patient? Self

## 2024-02-20 ENCOUNTER — TELEMEDICINE (OUTPATIENT)
Facility: CLINIC | Age: 34
End: 2024-02-20
Payer: MEDICARE

## 2024-02-20 DIAGNOSIS — F33.42 MAJOR DEPRESSIVE DISORDER, RECURRENT, IN FULL REMISSION (HCC): Primary | ICD-10-CM

## 2024-02-20 DIAGNOSIS — N92.6 IRREGULAR PERIODS: ICD-10-CM

## 2024-02-20 PROCEDURE — G8427 DOCREV CUR MEDS BY ELIG CLIN: HCPCS | Performed by: FAMILY MEDICINE

## 2024-02-20 PROCEDURE — 99213 OFFICE O/P EST LOW 20 MIN: CPT | Performed by: FAMILY MEDICINE

## 2024-02-20 RX ORDER — NORGESTIMATE AND ETHINYL ESTRADIOL 7DAYSX3 LO
1 KIT ORAL DAILY
Qty: 84 TABLET | Refills: 3 | Status: SHIPPED | OUTPATIENT
Start: 2024-02-20

## 2024-02-20 RX ORDER — CITALOPRAM HYDROBROMIDE 10 MG/1
10 TABLET ORAL
Qty: 30 TABLET | Refills: 3 | Status: SHIPPED | OUTPATIENT
Start: 2024-02-20

## 2024-02-20 ASSESSMENT — PATIENT HEALTH QUESTIONNAIRE - PHQ9
1. LITTLE INTEREST OR PLEASURE IN DOING THINGS: 0
SUM OF ALL RESPONSES TO PHQ9 QUESTIONS 1 & 2: 0
SUM OF ALL RESPONSES TO PHQ QUESTIONS 1-9: 0
2. FEELING DOWN, DEPRESSED OR HOPELESS: 0
SUM OF ALL RESPONSES TO PHQ QUESTIONS 1-9: 0

## 2024-02-20 NOTE — PROGRESS NOTES
2024    TELEHEALTH EVALUATION -- Audio/Visual    HPI:    Hosea Talavera (:  1990) has requested an audio/video evaluation for the following concern(s):    Depression - she reports that her mood is not where she wants it to be.  She reports that she cries frequently.  She recently moved and this has been difficult.  She is not taking any medication for her mood.    Review of Systems    Prior to Visit Medications    Medication Sig Taking? Authorizing Provider   valsartan-hydroCHLOROthiazide (DIOVAN-HCT) 80-12.5 MG per tablet TAKE 1 TABLET BY MOUTH ONCE  DAILY Yes Martir Oakes MD   cetirizine (ZYRTEC) 10 MG tablet Take 1 tablet by mouth daily Yes Martir Oakes MD   dulaglutide (TRULICITY) 1.5 MG/0.5ML SC injection Inject 0.5 mLs into the skin once a week Yes Martir Oakes MD   Norgestim-Eth Estrad Triphasic (TRI-LO-NAVEED) 0.18/0.215/0.25 MG-25 MCG TABS Take 1 tablet by mouth daily Yes Martir Oakes MD   verapamil (VERELAN) 300 MG CP24 extended release capsule Take 1 capsule by mouth daily Yes Martir Oakes MD   albuterol sulfate HFA (PROVENTIL;VENTOLIN;PROAIR) 108 (90 Base) MCG/ACT inhaler Inhale 1 puff into the lungs every 6 hours as needed Yes Automatic Reconciliation, Ar   ergocalciferol (ERGOCALCIFEROL) 1.25 MG (96869 UT) capsule Take 1 capsule by mouth every 7 days Yes Automatic Reconciliation, Ar   ferrous sulfate (FE TABS 325) 325 (65 Fe) MG EC tablet Take 1 tablet by mouth every morning (before breakfast) Yes Automatic Reconciliation, Ar       Social History     Tobacco Use    Smoking status: Never    Smokeless tobacco: Never   Substance Use Topics    Drug use: No            PHYSICAL EXAMINATION:  [ INSTRUCTIONS:  \"[x]\" Indicates a positive item  \"[]\" Indicates a negative item  -- DELETE ALL ITEMS NOT EXAMINED]  Vital Signs: (As obtained by patient/caregiver or practitioner observation)    Blood pressure-  Heart rate-    Respiratory rate-    Temperature-  Pulse oximetry-

## 2024-02-20 NOTE — PROGRESS NOTES
Hosea Talavera is a 33 y.o. presents today for No chief complaint on file.    Is someone accompanying this pt? no    Is the patient using any DME equipment during OV? no  There were no vitals filed for this visit.    Depression Screenin/20/2024     1:03 PM 2023     2:03 PM 2022     2:37 PM   PHQ-9 Questionaire   Little interest or pleasure in doing things 0 0 0   Feeling down, depressed, or hopeless 0 0 0   PHQ-9 Total Score 0 0 0        Abuse Screenin/13/2023     2:00 PM   AMB Abuse Screening   Do you ever feel afraid of your partner? N   Are you in a relationship with someone who physically or mentally threatens you? N   Is it safe for you to go home? Y        Learning Assessment Screening:   No question data found.     Fall Risk Screenin/13/2023     2:17 PM   Fall Risk   2 or more falls in past year? no   Fall with injury in past year? no           Health Maintenance: reviewed and discussed and ordered per Provider.    Health Maintenance Due   Topic Date Due    Hepatitis B vaccine (1 of 3 - 3-dose series) Never done    COVID-19 Vaccine (1) Never done    Varicella vaccine (1 of 2 - 2-dose childhood series) Never done    DTaP/Tdap/Td vaccine (1 - Tdap) Never done    Cervical cancer screen  Never done    A1C test (Diabetic or Prediabetic)  2023    Annual Wellness Visit (Medicare Advantage)  Never done         Coordination of Care:   1. \"Have you been to the ER, urgent care clinic since your last visit?  Hospitalized since your last visit?\" no    2. \"Have you seen or consulted any other health care providers outside of the [facility] since your last visit?\" no    3. For patients aged 45-75: Has the patient had a colonoscopy / FIT/ Cologuard?NA - based on age or sex  If the patient is female:    4. For patients aged 40-74: Has the patient had a mammogram within the past 2 years? NA - based on age or sex    5. For patients aged 21-65: Has the patient had

## 2024-10-15 ENCOUNTER — TELEPHONE (OUTPATIENT)
Facility: CLINIC | Age: 34
End: 2024-10-15

## 2024-10-15 NOTE — TELEPHONE ENCOUNTER
I have attempted to contact this patient by phone with the following results: left message to return call to the office  on answering machine to schedule AWV

## 2024-11-06 NOTE — TELEPHONE ENCOUNTER
Who is handling it at this time and why? Bring her in to discuss, please. 0 (no pain/absence of nonverbal indicators of pain)